# Patient Record
Sex: MALE | Race: WHITE | Employment: FULL TIME | URBAN - METROPOLITAN AREA
[De-identification: names, ages, dates, MRNs, and addresses within clinical notes are randomized per-mention and may not be internally consistent; named-entity substitution may affect disease eponyms.]

---

## 2017-02-16 ENCOUNTER — TELEPHONE (OUTPATIENT)
Dept: INTERNAL MEDICINE CLINIC | Age: 37
End: 2017-02-16

## 2017-02-16 ENCOUNTER — OFFICE VISIT (OUTPATIENT)
Dept: INTERNAL MEDICINE CLINIC | Age: 37
End: 2017-02-16

## 2017-02-16 VITALS
RESPIRATION RATE: 16 BRPM | HEART RATE: 90 BPM | DIASTOLIC BLOOD PRESSURE: 80 MMHG | BODY MASS INDEX: 55.41 KG/M2 | SYSTOLIC BLOOD PRESSURE: 130 MMHG | WEIGHT: 315 LBS

## 2017-02-16 DIAGNOSIS — L91.8 SKIN TAG: Primary | ICD-10-CM

## 2017-02-16 PROCEDURE — 99213 OFFICE O/P EST LOW 20 MIN: CPT | Performed by: INTERNAL MEDICINE

## 2017-03-07 ENCOUNTER — OFFICE VISIT (OUTPATIENT)
Dept: INTERNAL MEDICINE CLINIC | Age: 37
End: 2017-03-07

## 2017-03-07 VITALS
SYSTOLIC BLOOD PRESSURE: 132 MMHG | RESPIRATION RATE: 16 BRPM | WEIGHT: 315 LBS | BODY MASS INDEX: 40.43 KG/M2 | DIASTOLIC BLOOD PRESSURE: 70 MMHG | HEIGHT: 74 IN | HEART RATE: 96 BPM | OXYGEN SATURATION: 98 % | TEMPERATURE: 98.6 F

## 2017-03-07 DIAGNOSIS — H61.23 BILATERAL IMPACTED CERUMEN: Primary | ICD-10-CM

## 2017-03-07 PROCEDURE — 69210 REMOVE IMPACTED EAR WAX UNI: CPT | Performed by: NURSE PRACTITIONER

## 2017-03-07 PROCEDURE — 99213 OFFICE O/P EST LOW 20 MIN: CPT | Performed by: NURSE PRACTITIONER

## 2017-03-13 ENCOUNTER — OFFICE VISIT (OUTPATIENT)
Dept: INTERNAL MEDICINE CLINIC | Age: 37
End: 2017-03-13

## 2017-03-13 VITALS
RESPIRATION RATE: 16 BRPM | WEIGHT: 315 LBS | HEIGHT: 74 IN | HEART RATE: 74 BPM | SYSTOLIC BLOOD PRESSURE: 102 MMHG | BODY MASS INDEX: 40.43 KG/M2 | DIASTOLIC BLOOD PRESSURE: 60 MMHG

## 2017-03-13 DIAGNOSIS — H65.01 RIGHT ACUTE SEROUS OTITIS MEDIA, RECURRENCE NOT SPECIFIED: Primary | ICD-10-CM

## 2017-03-13 PROCEDURE — 99213 OFFICE O/P EST LOW 20 MIN: CPT | Performed by: INTERNAL MEDICINE

## 2017-03-13 RX ORDER — METHYLPREDNISOLONE 4 MG/1
TABLET ORAL
Qty: 1 KIT | Refills: 0 | Status: SHIPPED | OUTPATIENT
Start: 2017-03-13 | End: 2017-03-19

## 2017-03-13 RX ORDER — AMOXICILLIN AND CLAVULANATE POTASSIUM 875; 125 MG/1; MG/1
1 TABLET, FILM COATED ORAL 2 TIMES DAILY
Qty: 20 TABLET | Refills: 0 | Status: SHIPPED | OUTPATIENT
Start: 2017-03-13 | End: 2017-03-23

## 2017-05-23 ENCOUNTER — TELEPHONE (OUTPATIENT)
Dept: INTERNAL MEDICINE CLINIC | Age: 37
End: 2017-05-23

## 2017-05-25 ENCOUNTER — HOSPITAL ENCOUNTER (OUTPATIENT)
Dept: CT IMAGING | Age: 37
Discharge: HOME | End: 2017-05-25
Attending: SURGERY | Admitting: SURGERY

## 2017-05-25 DIAGNOSIS — N23 RENAL COLIC: ICD-10-CM

## 2017-06-22 ENCOUNTER — OFFICE VISIT (OUTPATIENT)
Dept: PULMONOLOGY | Age: 37
End: 2017-06-22

## 2017-06-22 VITALS
WEIGHT: 315 LBS | OXYGEN SATURATION: 96 % | HEART RATE: 80 BPM | TEMPERATURE: 98.5 F | HEIGHT: 74 IN | RESPIRATION RATE: 16 BRPM | DIASTOLIC BLOOD PRESSURE: 70 MMHG | BODY MASS INDEX: 40.43 KG/M2 | SYSTOLIC BLOOD PRESSURE: 124 MMHG

## 2017-06-22 DIAGNOSIS — J31.0 CHRONIC RHINITIS: ICD-10-CM

## 2017-06-22 DIAGNOSIS — Z99.89 OSA ON CPAP: Primary | ICD-10-CM

## 2017-06-22 DIAGNOSIS — G47.33 OSA ON CPAP: Primary | ICD-10-CM

## 2017-06-22 PROCEDURE — 99213 OFFICE O/P EST LOW 20 MIN: CPT | Performed by: INTERNAL MEDICINE

## 2017-06-22 ASSESSMENT — SLEEP AND FATIGUE QUESTIONNAIRES
HOW LIKELY ARE YOU TO NOD OFF OR FALL ASLEEP WHILE SITTING QUIETLY AFTER LUNCH WITHOUT ALCOHOL: 0
ESS TOTAL SCORE: 3
HOW LIKELY ARE YOU TO NOD OFF OR FALL ASLEEP WHILE SITTING AND READING: 1
HOW LIKELY ARE YOU TO NOD OFF OR FALL ASLEEP WHILE LYING DOWN TO REST IN THE AFTERNOON WHEN CIRCUMSTANCES PERMIT: 2
NECK CIRCUMFERENCE (INCHES): 20.75
HOW LIKELY ARE YOU TO NOD OFF OR FALL ASLEEP WHEN YOU ARE A PASSENGER IN A CAR FOR AN HOUR WITHOUT A BREAK: 0
HOW LIKELY ARE YOU TO NOD OFF OR FALL ASLEEP WHILE WATCHING TV: 0
HOW LIKELY ARE YOU TO NOD OFF OR FALL ASLEEP WHILE SITTING INACTIVE IN A PUBLIC PLACE: 0
HOW LIKELY ARE YOU TO NOD OFF OR FALL ASLEEP WHILE SITTING AND TALKING TO SOMEONE: 0
HOW LIKELY ARE YOU TO NOD OFF OR FALL ASLEEP IN A CAR, WHILE STOPPED FOR A FEW MINUTES IN TRAFFIC: 0

## 2017-06-30 ENCOUNTER — HOSPITAL ENCOUNTER (OUTPATIENT)
Dept: OTHER | Age: 37
Discharge: HOME | End: 2017-06-30
Attending: SURGERY | Admitting: SURGERY

## 2017-06-30 DIAGNOSIS — N20.0 CALCULUS OF KIDNEY: ICD-10-CM

## 2017-10-13 RX ORDER — CITALOPRAM 40 MG/1
TABLET ORAL
Qty: 30 TABLET | Refills: 0 | OUTPATIENT
Start: 2017-10-13 | End: 2017-11-07 | Stop reason: SDUPTHER

## 2017-11-07 RX ORDER — CITALOPRAM 40 MG/1
TABLET ORAL
Qty: 30 TABLET | Refills: 0 | Status: SHIPPED | OUTPATIENT
Start: 2017-11-07 | End: 2017-12-05 | Stop reason: SDUPTHER

## 2017-12-01 ENCOUNTER — OFFICE VISIT (OUTPATIENT)
Dept: INTERNAL MEDICINE CLINIC | Age: 37
End: 2017-12-01

## 2017-12-01 VITALS
DIASTOLIC BLOOD PRESSURE: 82 MMHG | SYSTOLIC BLOOD PRESSURE: 150 MMHG | BODY MASS INDEX: 56.6 KG/M2 | RESPIRATION RATE: 28 BRPM | OXYGEN SATURATION: 98 % | HEART RATE: 85 BPM | WEIGHT: 315 LBS

## 2017-12-01 DIAGNOSIS — R10.12 LUQ PAIN: Primary | ICD-10-CM

## 2017-12-01 LAB
A/G RATIO: 1.1 (ref 1.1–2.2)
ALBUMIN SERPL-MCNC: 4 G/DL (ref 3.4–5)
ALP BLD-CCNC: 94 U/L (ref 40–129)
ALT SERPL-CCNC: 74 U/L (ref 10–40)
ANION GAP SERPL CALCULATED.3IONS-SCNC: 14 MMOL/L (ref 3–16)
AST SERPL-CCNC: 45 U/L (ref 15–37)
BASOPHILS ABSOLUTE: 0.2 K/UL (ref 0–0.2)
BASOPHILS RELATIVE PERCENT: 1.2 %
BILIRUB SERPL-MCNC: 0.3 MG/DL (ref 0–1)
BILIRUBIN URINE: NEGATIVE
BLOOD, URINE: NEGATIVE
BUN BLDV-MCNC: 8 MG/DL (ref 7–20)
CALCIUM SERPL-MCNC: 9.8 MG/DL (ref 8.3–10.6)
CHLORIDE BLD-SCNC: 99 MMOL/L (ref 99–110)
CLARITY: ABNORMAL
CO2: 27 MMOL/L (ref 21–32)
COLOR: YELLOW
CREAT SERPL-MCNC: 0.6 MG/DL (ref 0.9–1.3)
EOSINOPHILS ABSOLUTE: 0.6 K/UL (ref 0–0.6)
EOSINOPHILS RELATIVE PERCENT: 3.3 %
EPITHELIAL CELLS, UA: 1 /HPF (ref 0–5)
GFR AFRICAN AMERICAN: >60
GFR NON-AFRICAN AMERICAN: >60
GLOBULIN: 3.6 G/DL
GLUCOSE BLD-MCNC: 135 MG/DL (ref 70–99)
GLUCOSE URINE: NEGATIVE MG/DL
HCT VFR BLD CALC: 43.2 % (ref 40.5–52.5)
HEMOGLOBIN: 14.3 G/DL (ref 13.5–17.5)
HYALINE CASTS: 0 /LPF (ref 0–8)
KETONES, URINE: NEGATIVE MG/DL
LEUKOCYTE ESTERASE, URINE: NEGATIVE
LYMPHOCYTES ABSOLUTE: 3.4 K/UL (ref 1–5.1)
LYMPHOCYTES RELATIVE PERCENT: 19.6 %
MCH RBC QN AUTO: 28.5 PG (ref 26–34)
MCHC RBC AUTO-ENTMCNC: 33 G/DL (ref 31–36)
MCV RBC AUTO: 86.4 FL (ref 80–100)
MICROSCOPIC EXAMINATION: YES
MONOCYTES ABSOLUTE: 1 K/UL (ref 0–1.3)
MONOCYTES RELATIVE PERCENT: 5.6 %
NEUTROPHILS ABSOLUTE: 12.2 K/UL (ref 1.7–7.7)
NEUTROPHILS RELATIVE PERCENT: 70.3 %
NITRITE, URINE: NEGATIVE
PDW BLD-RTO: 14.1 % (ref 12.4–15.4)
PH UA: 7
PLATELET # BLD: 463 K/UL (ref 135–450)
PMV BLD AUTO: 8.4 FL (ref 5–10.5)
POTASSIUM SERPL-SCNC: 4.1 MMOL/L (ref 3.5–5.1)
PROTEIN UA: NEGATIVE MG/DL
RBC # BLD: 4.99 M/UL (ref 4.2–5.9)
RBC UA: 4 /HPF (ref 0–4)
SODIUM BLD-SCNC: 140 MMOL/L (ref 136–145)
SPECIFIC GRAVITY UA: 1.02
TOTAL PROTEIN: 7.6 G/DL (ref 6.4–8.2)
UROBILINOGEN, URINE: 0.2 E.U./DL
WBC # BLD: 17.4 K/UL (ref 4–11)
WBC UA: 1 /HPF (ref 0–5)

## 2017-12-01 PROCEDURE — 99213 OFFICE O/P EST LOW 20 MIN: CPT | Performed by: INTERNAL MEDICINE

## 2017-12-03 DIAGNOSIS — R10.12 LEFT UPPER QUADRANT PAIN: Primary | ICD-10-CM

## 2017-12-03 DIAGNOSIS — D72.828 OTHER ELEVATED WHITE BLOOD CELL (WBC) COUNT: ICD-10-CM

## 2017-12-03 DIAGNOSIS — R79.89 ABNORMAL LFTS: ICD-10-CM

## 2017-12-08 ENCOUNTER — HOSPITAL ENCOUNTER (OUTPATIENT)
Dept: CT IMAGING | Age: 37
Discharge: HOME | End: 2017-12-08
Attending: INTERNAL MEDICINE | Admitting: INTERNAL MEDICINE

## 2017-12-08 DIAGNOSIS — D72.828 OTHER ELEVATED WHITE BLOOD CELL (WBC) COUNT: ICD-10-CM

## 2017-12-08 DIAGNOSIS — R79.89 ABNORMAL LFTS: ICD-10-CM

## 2017-12-08 DIAGNOSIS — R10.12 LEFT UPPER QUADRANT PAIN: ICD-10-CM

## 2017-12-28 ENCOUNTER — OFFICE VISIT (OUTPATIENT)
Dept: INTERNAL MEDICINE CLINIC | Age: 37
End: 2017-12-28

## 2017-12-28 VITALS
HEART RATE: 84 BPM | SYSTOLIC BLOOD PRESSURE: 124 MMHG | HEIGHT: 74 IN | BODY MASS INDEX: 40.43 KG/M2 | DIASTOLIC BLOOD PRESSURE: 60 MMHG | RESPIRATION RATE: 16 BRPM | WEIGHT: 315 LBS

## 2017-12-28 DIAGNOSIS — M94.0 COSTOCHONDRITIS: Primary | ICD-10-CM

## 2017-12-28 PROCEDURE — 99213 OFFICE O/P EST LOW 20 MIN: CPT | Performed by: INTERNAL MEDICINE

## 2017-12-28 RX ORDER — NAPROXEN 500 MG/1
500 TABLET ORAL 2 TIMES DAILY WITH MEALS
Qty: 30 TABLET | Refills: 1 | Status: SHIPPED | OUTPATIENT
Start: 2017-12-28 | End: 2018-06-04 | Stop reason: ALTCHOICE

## 2017-12-28 ASSESSMENT — PATIENT HEALTH QUESTIONNAIRE - PHQ9
1. LITTLE INTEREST OR PLEASURE IN DOING THINGS: 0
2. FEELING DOWN, DEPRESSED OR HOPELESS: 0
SUM OF ALL RESPONSES TO PHQ9 QUESTIONS 1 & 2: 0
SUM OF ALL RESPONSES TO PHQ QUESTIONS 1-9: 0

## 2017-12-28 NOTE — PROGRESS NOTES
Subjective:      Patient ID: Garret Barragan is a 30 y.o. male. HPI  Here to f/u his left flank pain. The pain is still a vague LUQ pain. The pain is a stabbing sensation, comes and goes in waves. It is a weird fluttering sensation, stabbing. It feels like a shock sensation. It comes and goes without warning, no aggravating or alleviating factors. May be present after eating, but this is not consistent. It may last 5 minutes to 30 minutes. It is not a constant pain at all. Notices it twice daily typically. Worse when lying on his left side. This keeps him from sleeping on his left side. No trauma noted    Review of Systems    Objective:   Physical Exam  Abdomen is benign. Tenderness is noted along the lower rib cage in the midclavicular line. The lateral lower rib cage is nontender, and the rib cage is not tender near the sternum. The CT scan is been reviewed with the patient and shows no abnormalities explaining his symptoms. Assessment:      Costochondritis-  Symptom constellation now currently points to costochondritis. Will treat with Naprosyn twice daily for 10-14 days. Call the symptoms do not resolve.       Plan:      As above

## 2018-03-28 ENCOUNTER — OFFICE VISIT (OUTPATIENT)
Dept: INTERNAL MEDICINE CLINIC | Age: 38
End: 2018-03-28

## 2018-03-28 VITALS
OXYGEN SATURATION: 98 % | SYSTOLIC BLOOD PRESSURE: 138 MMHG | WEIGHT: 315 LBS | HEART RATE: 78 BPM | DIASTOLIC BLOOD PRESSURE: 70 MMHG | BODY MASS INDEX: 56.24 KG/M2

## 2018-03-28 DIAGNOSIS — G47.33 OSA (OBSTRUCTIVE SLEEP APNEA): ICD-10-CM

## 2018-03-28 DIAGNOSIS — F41.9 ANXIETY: Primary | ICD-10-CM

## 2018-03-28 DIAGNOSIS — E66.01 MORBID OBESITY DUE TO EXCESS CALORIES (HCC): ICD-10-CM

## 2018-03-28 DIAGNOSIS — R73.9 HYPERGLYCEMIA: ICD-10-CM

## 2018-03-28 LAB
ESTIMATED AVERAGE GLUCOSE: 119.8 MG/DL
HBA1C MFR BLD: 5.8 %

## 2018-03-28 PROCEDURE — 99214 OFFICE O/P EST MOD 30 MIN: CPT | Performed by: INTERNAL MEDICINE

## 2018-03-28 RX ORDER — LORAZEPAM 0.5 MG/1
0.5 TABLET ORAL 3 TIMES DAILY PRN
Qty: 30 TABLET | Refills: 1 | Status: SHIPPED | OUTPATIENT
Start: 2018-03-28 | End: 2021-06-17 | Stop reason: SDUPTHER

## 2018-03-29 ASSESSMENT — ENCOUNTER SYMPTOMS
ABDOMINAL PAIN: 0
VOMITING: 0
TROUBLE SWALLOWING: 0
NAUSEA: 0
WHEEZING: 0
SHORTNESS OF BREATH: 0
DIARRHEA: 0
SORE THROAT: 0

## 2018-03-29 NOTE — PROGRESS NOTES
Department of Internal Medicine  Clinic Note    Date: 3/28/2018                                               Subjective/Objective:     Chief Complaint   Patient presents with    Anxiety     c/o bad anxiety last night. Has been on Celexa. HPI: Patient presents today for evaluation of recurrent anxiety. Patient states that he was started on Celexa a couple years ago for his anxiety with intermittent depression which has helped him manage his symptoms well. He states that recently he has had a couple episodes of anxiety without any overt cause. Patient states the most recent was 2 nights ago when he was at home watching a movie with his stepdad. He states that there were seen within the movie were guns were being fired and he feels that this may have contributed to his symptoms but during that time he felt trapped, helpless and enclosed. He states that he was given something a couple years ago on an as-needed basis for similar symptoms that worked very well. Chart review indicates the patient had a prescription for when necessary Ativan. We will provide the patient with a refill for this at this time. Patient and I had a long discussion regarding the addictive potential of this medication  Patient and I also had a long discussion regarding his obesity and the health benefits of diet and exercise. Patient Active Problem List    Diagnosis Date Noted    Neutrophilic leukocytosis 11/24/7701    LAUREN (obstructive sleep apnea) 01/13/2014    Anxiety 12/21/2012    Morbid obesity due to excess calories ([address] 11/27/2012       Social History:   TOBACCO:   reports that he has never smoked. He has never used smokeless tobacco.     ETOH:   reports that he drinks alcohol.     Past Medical History:   Diagnosis Date    Anxiety     Influenza A 03/27/2017    Kidney stones     Obesity     Rectal bleeding     intermittently    Ulcerative colitis ([address]     Unspecified sleep apnea        Past Surgical History: Take 1 tablet by mouth 3 times daily as needed for Anxiety. 30 tablet 1    naproxen (NAPROSYN) 500 MG tablet Take 1 tablet by mouth 2 times daily (with meals) 30 tablet 1    citalopram (CELEXA) 40 MG tablet TAKE 1 TABLET BY MOUTH EVERY DAY 30 tablet 5    acetaminophen (AMINOFEN) 325 MG tablet Take 2 tablets by mouth every 6 hours as needed for Pain 60 tablet 0    tamsulosin (FLOMAX) 0.4 MG capsule Take 1 capsule by mouth daily for 10 days 10 capsule 0     No current facility-administered medications for this visit. No Known Allergies    Review of Systems   Constitutional: Negative for chills, fatigue and fever. HENT: Negative for ear pain, sore throat, tinnitus and trouble swallowing. Eyes: Negative for visual disturbance. Respiratory: Negative for shortness of breath and wheezing. Cardiovascular: Negative for chest pain and palpitations. Gastrointestinal: Negative for abdominal pain, diarrhea, nausea and vomiting. Endocrine: Negative for cold intolerance and heat intolerance. Genitourinary: Negative for difficulty urinating and dysuria. Neurological: Negative for dizziness, weakness and numbness. Psychiatric/Behavioral: Positive for agitation. Negative for decreased concentration and suicidal ideas. The patient is nervous/anxious. All other systems reviewed and are negative. Vitals:  /70 (Site: Right Arm, Cuff Size: Medium Adult)   Pulse 78   Wt (!) 438 lb (198.7 kg)   SpO2 98%   BMI 56.24 kg/m²     Physical Exam   Constitutional: He is oriented to person, place, and time. He appears well-developed and well-nourished. HENT:   Head: Normocephalic and atraumatic. Eyes: Conjunctivae and lids are normal. Pupils are equal, round, and reactive to light. Neck: Trachea normal and normal range of motion. No hepatojugular reflux and no JVD present. Carotid bruit is not present. No thyromegaly present. Cardiovascular: Normal rate, regular rhythm and normal heart sounds.

## 2018-04-10 ENCOUNTER — OFFICE VISIT (OUTPATIENT)
Dept: INTERNAL MEDICINE CLINIC | Age: 38
End: 2018-04-10

## 2018-04-10 VITALS
WEIGHT: 315 LBS | RESPIRATION RATE: 16 BRPM | HEART RATE: 86 BPM | HEIGHT: 74 IN | BODY MASS INDEX: 40.43 KG/M2 | SYSTOLIC BLOOD PRESSURE: 130 MMHG | DIASTOLIC BLOOD PRESSURE: 80 MMHG

## 2018-04-10 DIAGNOSIS — F32.A ANXIETY AND DEPRESSION: Primary | ICD-10-CM

## 2018-04-10 DIAGNOSIS — F41.9 ANXIETY AND DEPRESSION: Primary | ICD-10-CM

## 2018-04-10 PROCEDURE — 99213 OFFICE O/P EST LOW 20 MIN: CPT | Performed by: INTERNAL MEDICINE

## 2018-04-10 RX ORDER — BUPROPION HYDROCHLORIDE 150 MG/1
150 TABLET ORAL EVERY MORNING
Qty: 30 TABLET | Refills: 1 | Status: SHIPPED | OUTPATIENT
Start: 2018-04-10 | End: 2018-04-17

## 2018-04-16 ENCOUNTER — TELEPHONE (OUTPATIENT)
Dept: INTERNAL MEDICINE CLINIC | Age: 38
End: 2018-04-16

## 2018-04-17 RX ORDER — BUPROPION HYDROCHLORIDE 100 MG/1
100 TABLET, EXTENDED RELEASE ORAL DAILY
Qty: 30 TABLET | Refills: 3 | Status: SHIPPED | OUTPATIENT
Start: 2018-04-17 | End: 2018-05-17

## 2018-05-17 ENCOUNTER — OFFICE VISIT (OUTPATIENT)
Dept: INTERNAL MEDICINE CLINIC | Age: 38
End: 2018-05-17

## 2018-05-17 VITALS
WEIGHT: 315 LBS | SYSTOLIC BLOOD PRESSURE: 130 MMHG | HEART RATE: 78 BPM | HEIGHT: 74 IN | DIASTOLIC BLOOD PRESSURE: 80 MMHG | RESPIRATION RATE: 16 BRPM | BODY MASS INDEX: 40.43 KG/M2

## 2018-05-17 DIAGNOSIS — R35.0 URINE FREQUENCY: ICD-10-CM

## 2018-05-17 DIAGNOSIS — F41.8 DEPRESSION WITH ANXIETY: Primary | ICD-10-CM

## 2018-05-17 LAB
BILIRUBIN, POC: NORMAL
BLOOD URINE, POC: NORMAL
CLARITY, POC: NORMAL
COLOR, POC: NORMAL
GLUCOSE URINE, POC: NORMAL
KETONES, POC: NORMAL
LEUKOCYTE EST, POC: NORMAL
NITRITE, POC: NORMAL
PH, POC: 6
PROTEIN, POC: NORMAL
SPECIFIC GRAVITY, POC: 1.02
UROBILINOGEN, POC: 0.2

## 2018-05-17 PROCEDURE — 99213 OFFICE O/P EST LOW 20 MIN: CPT | Performed by: INTERNAL MEDICINE

## 2018-05-17 PROCEDURE — 81002 URINALYSIS NONAUTO W/O SCOPE: CPT | Performed by: INTERNAL MEDICINE

## 2018-05-17 RX ORDER — SULFAMETHOXAZOLE AND TRIMETHOPRIM 800; 160 MG/1; MG/1
1 TABLET ORAL 2 TIMES DAILY
Qty: 20 TABLET | Refills: 0 | Status: SHIPPED | OUTPATIENT
Start: 2018-05-17 | End: 2018-05-27

## 2018-05-17 RX ORDER — BUPROPION HYDROCHLORIDE 150 MG/1
150 TABLET ORAL EVERY MORNING
Qty: 90 TABLET | Refills: 1 | Status: SHIPPED | OUTPATIENT
Start: 2018-05-17 | End: 2018-12-29 | Stop reason: SDUPTHER

## 2018-06-04 ENCOUNTER — OFFICE VISIT (OUTPATIENT)
Dept: INTERNAL MEDICINE CLINIC | Age: 38
End: 2018-06-04

## 2018-06-04 VITALS
OXYGEN SATURATION: 97 % | HEIGHT: 74 IN | SYSTOLIC BLOOD PRESSURE: 115 MMHG | WEIGHT: 315 LBS | BODY MASS INDEX: 40.43 KG/M2 | TEMPERATURE: 98.8 F | RESPIRATION RATE: 12 BRPM | DIASTOLIC BLOOD PRESSURE: 80 MMHG | HEART RATE: 82 BPM

## 2018-06-04 DIAGNOSIS — N41.0 ACUTE PROSTATITIS: Primary | ICD-10-CM

## 2018-06-04 PROCEDURE — 99213 OFFICE O/P EST LOW 20 MIN: CPT | Performed by: INTERNAL MEDICINE

## 2018-06-04 RX ORDER — LEVOFLOXACIN 500 MG/1
500 TABLET, FILM COATED ORAL DAILY
Qty: 10 TABLET | Refills: 0 | Status: SHIPPED | OUTPATIENT
Start: 2018-06-04 | End: 2018-06-14

## 2018-12-31 RX ORDER — CITALOPRAM 40 MG/1
TABLET ORAL
Qty: 30 TABLET | Refills: 0 | Status: SHIPPED | OUTPATIENT
Start: 2018-12-31 | End: 2019-02-01 | Stop reason: SDUPTHER

## 2018-12-31 RX ORDER — BUPROPION HYDROCHLORIDE 150 MG/1
150 TABLET ORAL EVERY MORNING
Qty: 90 TABLET | Refills: 0 | Status: SHIPPED | OUTPATIENT
Start: 2018-12-31 | End: 2019-05-05 | Stop reason: SDUPTHER

## 2019-01-09 ENCOUNTER — APPOINTMENT (OUTPATIENT)
Dept: GENERAL RADIOLOGY | Age: 39
End: 2019-01-09
Payer: COMMERCIAL

## 2019-01-09 ENCOUNTER — HOSPITAL ENCOUNTER (EMERGENCY)
Age: 39
Discharge: HOME | End: 2019-01-09
Payer: COMMERCIAL

## 2019-01-09 ENCOUNTER — APPOINTMENT (OUTPATIENT)
Dept: ULTRASOUND IMAGING | Age: 39
End: 2019-01-09
Payer: COMMERCIAL

## 2019-01-09 VITALS
OXYGEN SATURATION: 97 % | RESPIRATION RATE: 23 BRPM | TEMPERATURE: 99 F | HEIGHT: 74 IN | DIASTOLIC BLOOD PRESSURE: 79 MMHG | SYSTOLIC BLOOD PRESSURE: 98 MMHG | WEIGHT: 315 LBS | HEART RATE: 81 BPM | BODY MASS INDEX: 40.43 KG/M2

## 2019-01-09 DIAGNOSIS — D72.828 OTHER ELEVATED WHITE BLOOD CELL (WBC) COUNT: Primary | ICD-10-CM

## 2019-01-09 DIAGNOSIS — R07.9 CHEST PAIN, UNSPECIFIED TYPE: ICD-10-CM

## 2019-01-09 DIAGNOSIS — R10.13 ABDOMINAL PAIN, EPIGASTRIC: ICD-10-CM

## 2019-01-09 LAB
A/G RATIO: 0.9 (ref 1.1–2.2)
ALBUMIN SERPL-MCNC: 3.8 G/DL (ref 3.4–5)
ALP BLD-CCNC: 97 U/L (ref 40–129)
ALT SERPL-CCNC: 52 U/L (ref 10–40)
ANION GAP SERPL CALCULATED.3IONS-SCNC: 15 MMOL/L (ref 3–16)
AST SERPL-CCNC: 24 U/L (ref 15–37)
BASOPHILS ABSOLUTE: 0.2 K/UL (ref 0–0.2)
BASOPHILS RELATIVE PERCENT: 0.8 %
BILIRUB SERPL-MCNC: 0.3 MG/DL (ref 0–1)
BUN BLDV-MCNC: 13 MG/DL (ref 7–20)
CALCIUM SERPL-MCNC: 9.5 MG/DL (ref 8.3–10.6)
CHLORIDE BLD-SCNC: 103 MMOL/L (ref 99–110)
CO2: 22 MMOL/L (ref 21–32)
CREAT SERPL-MCNC: 0.6 MG/DL (ref 0.9–1.3)
EKG ATRIAL RATE: 86 BPM
EKG DIAGNOSIS: NORMAL
EKG P AXIS: 14 DEGREES
EKG P-R INTERVAL: 164 MS
EKG Q-T INTERVAL: 402 MS
EKG QRS DURATION: 116 MS
EKG QTC CALCULATION (BAZETT): 481 MS
EKG R AXIS: 24 DEGREES
EKG T AXIS: 39 DEGREES
EKG VENTRICULAR RATE: 86 BPM
EOSINOPHILS ABSOLUTE: 0.6 K/UL (ref 0–0.6)
EOSINOPHILS RELATIVE PERCENT: 3.4 %
GFR AFRICAN AMERICAN: >60
GFR NON-AFRICAN AMERICAN: >60
GLOBULIN: 4.3 G/DL
GLUCOSE BLD-MCNC: 97 MG/DL (ref 70–99)
HCT VFR BLD CALC: 43.2 % (ref 40.5–52.5)
HEMOGLOBIN: 14.3 G/DL (ref 13.5–17.5)
LIPASE: 21 U/L (ref 13–60)
LYMPHOCYTES ABSOLUTE: 4 K/UL (ref 1–5.1)
LYMPHOCYTES RELATIVE PERCENT: 21.9 %
MCH RBC QN AUTO: 28.2 PG (ref 26–34)
MCHC RBC AUTO-ENTMCNC: 33.1 G/DL (ref 31–36)
MCV RBC AUTO: 85.2 FL (ref 80–100)
MONOCYTES ABSOLUTE: 0.8 K/UL (ref 0–1.3)
MONOCYTES RELATIVE PERCENT: 4.2 %
NEUTROPHILS ABSOLUTE: 12.7 K/UL (ref 1.7–7.7)
NEUTROPHILS RELATIVE PERCENT: 69.7 %
PDW BLD-RTO: 14.9 % (ref 12.4–15.4)
PLATELET # BLD: 441 K/UL (ref 135–450)
PMV BLD AUTO: 7.6 FL (ref 5–10.5)
POTASSIUM SERPL-SCNC: 4.1 MMOL/L (ref 3.5–5.1)
RBC # BLD: 5.08 M/UL (ref 4.2–5.9)
SODIUM BLD-SCNC: 140 MMOL/L (ref 136–145)
TOTAL PROTEIN: 8.1 G/DL (ref 6.4–8.2)
TROPONIN: <0.01 NG/ML
WBC # BLD: 18.2 K/UL (ref 4–11)

## 2019-01-09 PROCEDURE — 84484 ASSAY OF TROPONIN QUANT: CPT

## 2019-01-09 PROCEDURE — 71046 X-RAY EXAM CHEST 2 VIEWS: CPT

## 2019-01-09 PROCEDURE — 93010 ELECTROCARDIOGRAM REPORT: CPT | Performed by: INTERNAL MEDICINE

## 2019-01-09 PROCEDURE — 93005 ELECTROCARDIOGRAM TRACING: CPT | Performed by: NURSE PRACTITIONER

## 2019-01-09 PROCEDURE — 80053 COMPREHEN METABOLIC PANEL: CPT

## 2019-01-09 PROCEDURE — 36415 COLL VENOUS BLD VENIPUNCTURE: CPT

## 2019-01-09 PROCEDURE — 99284 EMERGENCY DEPT VISIT MOD MDM: CPT

## 2019-01-09 PROCEDURE — 83690 ASSAY OF LIPASE: CPT

## 2019-01-09 PROCEDURE — 6370000000 HC RX 637 (ALT 250 FOR IP): Performed by: NURSE PRACTITIONER

## 2019-01-09 PROCEDURE — 85025 COMPLETE CBC W/AUTO DIFF WBC: CPT

## 2019-01-09 PROCEDURE — 76705 ECHO EXAM OF ABDOMEN: CPT

## 2019-01-09 RX ORDER — DICYCLOMINE HYDROCHLORIDE 10 MG/1
10 CAPSULE ORAL
Qty: 30 CAPSULE | Refills: 0 | Status: SHIPPED | OUTPATIENT
Start: 2019-01-09 | End: 2020-01-09

## 2019-01-09 RX ORDER — MAGNESIUM HYDROXIDE/ALUMINUM HYDROXICE/SIMETHICONE 120; 1200; 1200 MG/30ML; MG/30ML; MG/30ML
5 SUSPENSION ORAL EVERY 6 HOURS PRN
Qty: 1 BOTTLE | Refills: 0 | Status: SHIPPED | OUTPATIENT
Start: 2019-01-09 | End: 2020-01-09

## 2019-01-09 RX ORDER — OMEPRAZOLE 40 MG/1
40 CAPSULE, DELAYED RELEASE ORAL
Qty: 30 CAPSULE | Refills: 0 | Status: SHIPPED | OUTPATIENT
Start: 2019-01-09 | End: 2020-01-09

## 2019-01-09 RX ORDER — FAMOTIDINE 20 MG/1
20 TABLET, FILM COATED ORAL 2 TIMES DAILY
Qty: 60 TABLET | Refills: 0 | Status: SHIPPED | OUTPATIENT
Start: 2019-01-09 | End: 2021-01-11

## 2019-01-09 RX ADMIN — LIDOCAINE HYDROCHLORIDE: 20 SOLUTION ORAL; TOPICAL at 12:14

## 2019-01-09 ASSESSMENT — ENCOUNTER SYMPTOMS
ABDOMINAL PAIN: 1
RESPIRATORY NEGATIVE: 1
VOMITING: 0
NAUSEA: 0
DIARRHEA: 1

## 2019-01-09 ASSESSMENT — PAIN DESCRIPTION - ORIENTATION: ORIENTATION: LEFT;LOWER

## 2019-01-09 ASSESSMENT — PAIN DESCRIPTION - DESCRIPTORS: DESCRIPTORS: TENDER

## 2019-01-09 ASSESSMENT — PAIN DESCRIPTION - LOCATION: LOCATION: ABDOMEN

## 2019-01-09 ASSESSMENT — HEART SCORE: ECG: 0

## 2019-02-01 RX ORDER — CITALOPRAM 40 MG/1
TABLET ORAL
Qty: 30 TABLET | Refills: 5 | Status: SHIPPED | OUTPATIENT
Start: 2019-02-01 | End: 2019-09-21 | Stop reason: SDUPTHER

## 2019-09-23 RX ORDER — CITALOPRAM 40 MG/1
TABLET ORAL
Qty: 30 TABLET | Refills: 0 | OUTPATIENT
Start: 2019-09-23 | End: 2019-10-24 | Stop reason: SDUPTHER

## 2019-10-24 RX ORDER — CITALOPRAM 40 MG/1
TABLET ORAL
Qty: 30 TABLET | Refills: 0 | OUTPATIENT
Start: 2019-10-24 | End: 2019-12-07 | Stop reason: SDUPTHER

## 2019-12-07 RX ORDER — CITALOPRAM 40 MG/1
TABLET ORAL
Qty: 30 TABLET | Refills: 0 | Status: SHIPPED | OUTPATIENT
Start: 2019-12-07 | End: 2020-01-06

## 2020-01-06 RX ORDER — CITALOPRAM 40 MG/1
TABLET ORAL
Qty: 30 TABLET | Refills: 0 | Status: SHIPPED | OUTPATIENT
Start: 2020-01-06 | End: 2020-02-05

## 2020-01-10 ENCOUNTER — HOSPITAL ENCOUNTER (OUTPATIENT)
Dept: VASCULAR LAB | Age: 40
Discharge: HOME | End: 2020-01-10
Payer: COMMERCIAL

## 2020-01-10 PROCEDURE — 93971 EXTREMITY STUDY: CPT

## 2020-01-27 ENCOUNTER — TELEPHONE (OUTPATIENT)
Dept: SURGERY | Age: 40
End: 2020-01-27

## 2020-01-30 ENCOUNTER — INITIAL CONSULT (OUTPATIENT)
Dept: SURGERY | Age: 40
End: 2020-01-30
Payer: COMMERCIAL

## 2020-01-30 VITALS
WEIGHT: 315 LBS | HEART RATE: 81 BPM | DIASTOLIC BLOOD PRESSURE: 78 MMHG | SYSTOLIC BLOOD PRESSURE: 126 MMHG | BODY MASS INDEX: 58.75 KG/M2

## 2020-01-30 PROBLEM — M79.605 PAIN OF LEFT LEG: Status: ACTIVE | Noted: 2020-01-30

## 2020-01-30 PROBLEM — R22.42: Status: ACTIVE | Noted: 2020-01-30

## 2020-01-30 PROCEDURE — 99243 OFF/OP CNSLTJ NEW/EST LOW 30: CPT | Performed by: SURGERY

## 2020-01-30 ASSESSMENT — ENCOUNTER SYMPTOMS
EYES NEGATIVE: 1
RESPIRATORY NEGATIVE: 1
ALLERGIC/IMMUNOLOGIC NEGATIVE: 1
GASTROINTESTINAL NEGATIVE: 1

## 2020-01-30 NOTE — PROGRESS NOTES
4.20 - 5.90 M/uL Final    Hemoglobin 01/09/2019 14.3  13.5 - 17.5 g/dL Final    Hematocrit 01/09/2019 43.2  40.5 - 52.5 % Final    MCV 01/09/2019 85.2  80.0 - 100.0 fL Final    MCH 01/09/2019 28.2  26.0 - 34.0 pg Final    MCHC 01/09/2019 33.1  31.0 - 36.0 g/dL Final    RDW 01/09/2019 14.9  12.4 - 15.4 % Final    Platelets 80/00/6906 441  135 - 450 K/uL Final    MPV 01/09/2019 7.6  5.0 - 10.5 fL Final    Neutrophils % 01/09/2019 69.7  % Final    Lymphocytes % 01/09/2019 21.9  % Final    Monocytes % 01/09/2019 4.2  % Final    Eosinophils % 01/09/2019 3.4  % Final    Basophils % 01/09/2019 0.8  % Final    Neutrophils Absolute 01/09/2019 12.7* 1.7 - 7.7 K/uL Final    Lymphocytes Absolute 01/09/2019 4.0  1.0 - 5.1 K/uL Final    Monocytes Absolute 01/09/2019 0.8  0.0 - 1.3 K/uL Final    Eosinophils Absolute 01/09/2019 0.6  0.0 - 0.6 K/uL Final    Basophils Absolute 01/09/2019 0.2  0.0 - 0.2 K/uL Final    Sodium 01/09/2019 140  136 - 145 mmol/L Final    Potassium 01/09/2019 4.1  3.5 - 5.1 mmol/L Final    Chloride 01/09/2019 103  99 - 110 mmol/L Final    CO2 01/09/2019 22  21 - 32 mmol/L Final    Anion Gap 01/09/2019 15  3 - 16 Final    Glucose 01/09/2019 97  70 - 99 mg/dL Final    BUN 01/09/2019 13  7 - 20 mg/dL Final    CREATININE 01/09/2019 0.6* 0.9 - 1.3 mg/dL Final    GFR Non- 01/09/2019 >60  >60 Final    Comment: >60 mL/min/1.73m2 EGFR, calc. for ages 25 and older using the  MDRD formula (not corrected for weight), is valid for stable  renal function.  GFR  01/09/2019 >60  >60 Final    Comment: Chronic Kidney Disease: less than 60 ml/min/1.73 sq.m. Kidney Failure: less than 15 ml/min/1.73 sq.m. Results valid for patients 18 years and older.       Calcium 01/09/2019 9.5  8.3 - 10.6 mg/dL Final    Total Protein 01/09/2019 8.1  6.4 - 8.2 g/dL Final    Alb 01/09/2019 3.8  3.4 - 5.0 g/dL Final    Albumin/Globulin Ratio 01/09/2019 0.9* 1.1 - 2.2 Final    Total Bilirubin 01/09/2019 0.3  0.0 - 1.0 mg/dL Final    Alkaline Phosphatase 01/09/2019 97  40 - 129 U/L Final    ALT 01/09/2019 52* 10 - 40 U/L Final    AST 01/09/2019 24  15 - 37 U/L Final    Globulin 01/09/2019 4.3  g/dL Final    Lipase 01/09/2019 21.0  13.0 - 60.0 U/L Final    Troponin 01/09/2019 <0.01  <0.01 ng/mL Final    Methodology by Troponin T    Ventricular Rate 01/09/2019 86  BPM Final    Atrial Rate 01/09/2019 86  BPM Final    P-R Interval 01/09/2019 164  ms Final    QRS Duration 01/09/2019 116  ms Final    Q-T Interval 01/09/2019 402  ms Final    QTc Calculation (Bazett) 01/09/2019 481  ms Final    P Axis 01/09/2019 14  degrees Final    R Axis 01/09/2019 24  degrees Final    T Axis 01/09/2019 39  degrees Final    Diagnosis 01/09/2019 Normal sinus rhythmWhen compared with ECG of 06-MAY-2014 12:04,No significant change was foundConfirmed by [facility] DUGG[facility] MD ([id number]) on 1/9/2019 11:54:38 PM   Final       Review of Systems   Constitutional: Negative. HENT: Negative. Eyes: Negative. Respiratory: Negative. Cardiovascular: Negative. Gastrointestinal: Negative. Endocrine: Negative. Genitourinary: Negative. Musculoskeletal: Negative. Allergic/Immunologic: Negative. Neurological: Negative. Hematological: Negative. Psychiatric/Behavioral: Negative. All other systems reviewed and are negative. Physical Exam  Vitals signs and nursing note reviewed. Constitutional:       Appearance: Normal appearance. He is well-developed. He is obese. Comments: Morbidly obese   HENT:      Head: Normocephalic and atraumatic. Right Ear: External ear normal.      Left Ear: External ear normal.   Eyes:      General: No scleral icterus. Conjunctiva/sclera: Conjunctivae normal.      Pupils: Pupils are equal, round, and reactive to light. Neck:      Musculoskeletal: Normal range of motion and neck supple. Thyroid: No thyromegaly.       Vascular: No JVD. Trachea: No tracheal deviation. Cardiovascular:      Rate and Rhythm: Normal rate and regular rhythm. Pulses:           Femoral pulses are 2+ on the right side and 2+ on the left side. Dorsalis pedis pulses are 2+ on the right side and 2+ on the left side. Posterior tibial pulses are 2+ on the right side and 0 on the left side. Heart sounds: Normal heart sounds. No murmur. No gallop. Comments:   Doppler 1/30/2020:  Rt DP: biphasic   Rt PT: biphasic  Rt AT:     Lt DP: biphasic  Lt PT: biphasic  Lt AT:    MEASUREMENTS:    RIGHT ANKLE: 26.1 cm   RIGHT CALF: 49.0 cm     LEFT ANKLE: 27.2 cm   LEFT CALF: 50.6 cm       Pulmonary:      Effort: Pulmonary effort is normal.      Breath sounds: Normal breath sounds. No wheezing or rales. Chest:      Chest wall: No tenderness. Abdominal:      General: Bowel sounds are normal. There is no distension or abdominal bruit. Palpations: Abdomen is soft. There is no mass. Tenderness: There is no abdominal tenderness. There is no guarding or rebound. Hernia: No hernia is present. There is no hernia in the ventral area, right inguinal area or left inguinal area. Genitourinary:     Comments: RECTAL EXAM  &  Guaiac NOT INDICATED  Musculoskeletal: Normal range of motion. General: No tenderness. Legs:    Lymphadenopathy:      Head:      Right side of head: No submental, submandibular, preauricular or occipital adenopathy. Left side of head: No submental, submandibular, preauricular or occipital adenopathy. Cervical: No cervical adenopathy. Right cervical: No superficial, deep or posterior cervical adenopathy. Left cervical: No superficial, deep or posterior cervical adenopathy. Upper Body:      Right upper body: No supraclavicular or pectoral adenopathy. Left upper body: No supraclavicular or pectoral adenopathy. Skin:     General: Skin is warm and dry.    Neurological:

## 2020-04-05 RX ORDER — BUPROPION HYDROCHLORIDE 150 MG/1
150 TABLET ORAL EVERY MORNING
Qty: 90 TABLET | Refills: 3 | Status: SHIPPED | OUTPATIENT
Start: 2020-04-05 | End: 2021-06-01

## 2020-05-28 ENCOUNTER — TELEPHONE (OUTPATIENT)
Dept: BARIATRICS/WEIGHT MGMT | Age: 40
End: 2020-05-28

## 2020-06-10 ENCOUNTER — TELEPHONE (OUTPATIENT)
Dept: BARIATRICS/WEIGHT MGMT | Age: 40
End: 2020-06-10

## 2020-07-02 ENCOUNTER — OFFICE VISIT (OUTPATIENT)
Dept: BARIATRICS/WEIGHT MGMT | Age: 40
End: 2020-07-02
Payer: COMMERCIAL

## 2020-07-02 VITALS
TEMPERATURE: 97.1 F | WEIGHT: 315 LBS | BODY MASS INDEX: 40.43 KG/M2 | HEART RATE: 84 BPM | SYSTOLIC BLOOD PRESSURE: 124 MMHG | HEIGHT: 74 IN | DIASTOLIC BLOOD PRESSURE: 78 MMHG

## 2020-07-02 PROCEDURE — 99204 OFFICE O/P NEW MOD 45 MIN: CPT | Performed by: SURGERY

## 2020-07-02 NOTE — PROGRESS NOTES
Mecca Alvarado is a 42 y.o. male with a date of birth of 1980. Vitals:    07/02/20 1156   Temp: 97.1 °F (36.2 °C)   BMI: Body mass index is 56.95 kg/m². Obesity Classification: Class III    Weight History: Wt Readings from Last 3 Encounters:   07/02/20 (!) 443 lb 9.6 oz (201.2 kg)   01/30/20 (!) 457 lb 9.6 oz (207.6 kg)   01/17/20 (!) 456 lb (206.8 kg)       Patient's lowest adult weight was 300 lbs at age 27. Patient's highest adult weight was 443.6 lbs at age 42. Patient has participated in the following weight loss programs: Atkins Diet, , Calorie Restriction and Low Carb diet. Patient has participated in meal replacement/liquid diets. Patient has participated in weight loss medications - adipex. Patient is not lactose intolerant. Patient does not have Church/cultural food concerns. Patient does not have food allergies. Patient does tolerate artificial sweeteners. 24 hour recall/food frequency chart:  *Works 12hr shifts & takes care of his dad (a lot depends on what he likes), eating is somewhat inconsistent  Breakfast: no.   Snack: no.   Lunch: yes. 3p Calzone from [employer] w/ flat bread pizza & 2 vanilla cream sodas  Snack: no.   Dinner: yes. 10p Calzone  Snack: yes. 11p chocolate chip ice cream     Drinks throughout the day: regular soda & diet soda / powerade   Do you drink alcohol? Yes. How often/how much alcohol do you drink: 1 Beers per month (average). Patient does not meet the criteria for binge eating disorder. Patient does not have grazing. Patient does not have night eating. Patient does not have a history of emotional eating or eating out of boredom. Surgery  Patient does feel confident in his ability to make these changes. The patient's expectations of post-surgical eating habits - voices understanding. Patient states he does understand the consequences of not complying with post-op food guidelines.   Patient states he does understands the long term changes in food intake that will be necessary for all occasions after surgery for the rest of her life. Patient is deemed nutritionally appropriate to proceed. Goals  Weight: 220 lb  Health Improvement: sleep apnea, stamina, back pain, hopefully run again & be healthier overall    Assessment  Nutritional Needs: RMR=(9.99 x 202) + (6.25 x 188) - (4.92 x 39 y.o.) +5 = 3016 kcal x 1.4 (sedentary activity factor)= 4222 kcal - 1000 (for 2 lb weight loss/week)= 3222 kcal.    Plan  Plan/Recommendations: Start presurgical guidelines. Goals:   -Eat 4-5 times daily  -Avoid high fat and high sugar foods  -Include protein with all meals and snacks  -Avoid carbonation and caffeine  -Avoid calorie containing beverages  -Increase physical activity as tolerated    PES Statement:  Overweight/Obesity related to lack of exercise, sedentary lifestyle, unhealthy eating habits, and unsuccessful diet attempts as evidenced by BMI. Body mass index is 56.95 kg/m². Will follow up as necessary.     Saleem Pinon

## 2020-07-05 NOTE — PROGRESS NOTES
[facility]) Physicians   General & Laparoscopic Surgery  [employer] [facility] [employer]      HPI:    Venecia Xie is a very pleasant 42 y.o. obese male ,   Body mass index is 56.95 kg/m². And multiple medical problems who is presenting for weight loss surgery evaluation and consultation by Dr. Cami Billy. Patient has been struggling for several years now with obesity. Patient feels the weight is an obstacle to achieve and perform things in daily living as well risk on health. Tries to diet, and exercise but can't keep the weight off. Patient tried Atkins and other regimens, but with no sustainable weight loss. Patient  is very determined to lose weight and be healthy, and is interested in surgical weight loss to achieve this goal.    Otherwise patient denies any nausea, vomiting, fevers, chills, shortness of breath, chest pain, constipation or urinary symptoms. Pain Assessment   Denies any abdominal pain     Past Medical History:   Diagnosis Date    Anxiety     Chronic GERD     Depression     Influenza A 03/27/2017    Kidney stones     Obesity     Rectal bleeding     intermittently    Ulcerative colitis ([address]     Unspecified sleep apnea      Past Surgical History:   Procedure Laterality Date    ABDOMEN SURGERY Bilateral 1985    hernia repair    COLONOSCOPY  2009    tics, ulcerative colitis    COLONOSCOPY  3/2014    [place]-polyp x 2, diverticulosis     Family History   Problem Relation Age of Onset    Cancer Mother     Asthma Mother     High Blood Pressure Father     Obesity Father     Cancer Maternal Grandfather     Migraines Sister     Asthma Other     Irritable Bowel Syndrome Other     Stroke Other     Diabetes Other      Social History     Tobacco Use    Smoking status: Never Smoker    Smokeless tobacco: Never Used   Substance Use Topics    Alcohol use: Yes     Comment: rarely     I counseled the patient on the importance of not smoking and risks of ETOH.

## 2020-08-03 RX ORDER — CITALOPRAM 40 MG/1
TABLET ORAL
Qty: 30 TABLET | Refills: 5 | Status: SHIPPED | OUTPATIENT
Start: 2020-08-03 | End: 2021-04-30

## 2020-08-03 NOTE — PROGRESS NOTES
Butch Greco 49  1980 August 4, 2020    Referring Physician: Hernandez Oliveira MD  Reason for Referral: pre-operative risk assessment     CC: \"I need clearance. \"     HPI:  The patient is 42 y.o. male with a past medical history significant for sleep apnea and obesity who presents today for a pre-operative risk assessment prior to gastric sleeve surgery. He denies any new cardiac sounding complaints. He denies any history of CKD, CHF, MI, CVA, or DM on insulin. He describes a functional status of >4 METs. He reports chronic FREY that he relates to deconditioning and his weight but he denies any acute changes. He has chronic back pain that also limits his mobility but states he continues to mow the grass without any exertional chest pain. The FREY improves quickly with rest. He reports compliance with his CPAP. Patient denies exertional chest pain/pressure, dyspnea at rest, worsening FREY, PND, orthopnea, palpitations, lightheadedness, changes in LE edema, and syncope.     Past Medical History:   Diagnosis Date    Anxiety     Chronic GERD     Depression     Influenza A 03/27/2017    Kidney stones     Obesity     Rectal bleeding     intermittently    [address] [address]     Unspecified sleep apnea      Past Surgical History:   Procedure Laterality Date    ABDOMEN SURGERY Bilateral 1985    hernia repair    COLONOSCOPY  2009    tics, ulcerative colitis    COLONOSCOPY  3/2014    [place]-polyp x 2, diverticulosis     Family History   Problem Relation Age of Onset    Cancer Mother     Asthma Mother     High Blood Pressure Father     Obesity Father     Cancer Maternal Grandfather     Migraines Sister     Asthma Other     Irritable Bowel Syndrome Other     Stroke Other     Diabetes Other      Social History     Tobacco Use    Smoking status: Never Smoker    Smokeless tobacco: Never Used   Substance Use Topics    Alcohol use: Yes     Comment: rarely    Drug use: No       No Known Allergies  Current Outpatient Medications   Medication Sig Dispense Refill    citalopram (CELEXA) 40 MG tablet TAKE 1 TABLET BY MOUTH EVERY DAY 30 tablet 5    buPROPion (WELLBUTRIN XL) 150 MG extended release tablet TAKE 1 TABLET BY MOUTH EVERY MORNING 90 tablet 3    aspirin 325 MG EC tablet Take 325 mg by mouth 2 times daily      famotidine (PEPCID) 20 MG tablet Take 1 tablet by mouth 2 times daily 60 tablet 0    acetaminophen (AMINOFEN) 325 MG tablet Take 2 tablets by mouth every 6 hours as needed for Pain 60 tablet 0     No current facility-administered medications for this visit. Review of Systems:  · Constitutional: No unanticipated weight loss. There's been no change in energy level, sleep pattern, or activity level. No fevers, chills. · Eyes: No visual changes or diplopia. No scleral icterus. · ENT: No Headaches, hearing loss or vertigo. No mouth sores or sore throat. · Cardiovascular: as reviewed in HPI  · Respiratory: No cough or wheezing, no sputum production. No hemoptysis. · Gastrointestinal: No abdominal pain, appetite loss, blood in stools. No change in bowel or bladder habits. · Genitourinary: No dysuria, trouble voiding, or hematuria. · Musculoskeletal:  No gait disturbance, no joint complaints. · Integumentary: No rash or pruritis. · Neurological: No headache, diplopia, change in muscle strength, numbness or tingling. · Psychiatric: No anxiety or depression. · Endocrine: No temperature intolerance. No excessive thirst, fluid intake, or urination. No tremor. · Hematologic/Lymphatic: No abnormal bruising or bleeding, blood clots or swollen lymph nodes. · Allergic/Immunologic: No nasal congestion or hives.     Physical Exam:   /76 (Site: Right Lower Arm, Position: Sitting, Cuff Size: Medium Adult)   Pulse 75   Temp 97.9 °F (36.6 °C)   Ht 6' 2\" (1.88 m)   Wt (!) 451 lb 9.6 oz (204.8 kg) Comment: with shoes  SpO2 100%   BMI 57.98 kg/m²   Wt Readings from Last 3 Encounters:   08/04/20 (!) 451 lb 9.6 oz (204.8 kg)   07/02/20 (!) 443 lb 9.6 oz (201.2 kg)   01/30/20 (!) 457 lb 9.6 oz (207.6 kg)     Constitutional: He is an obese male who is oriented to person, place, and time. In no acute distress. Head: Normocephalic and atraumatic. Pupils equal and round. Neck: Neck supple. No JVP or carotid bruit appreciated. No mass and no thyromegaly present. No lymphadenopathy present. Cardiovascular: Normal rate. Normal heart sounds. Exam reveals no gallop and no friction rub. No murmur heard. Pulmonary/Chest: Effort normal and breath sounds normal. No respiratory distress. He has no wheezes, rhonchi or rales. Abdominal: Soft, non-tender. Bowel sounds are normal. He exhibits no organomegaly, mass or bruit. Extremities: Trace BLE edema. No cyanosis or clubbing. Pulses are 2+ radial/carotid bilaterally. Neurological: No gross cranial nerve deficit. Coordination normal.   Skin: Skin is warm and dry. There is no rash or diaphoresis. Psychiatric: He has a normal mood and affect. His speech is normal and behavior is normal.     Lab Review:   FLP:    Lab Results   Component Value Date    TRIG 70 10/10/2016    HDL 43 10/10/2016    LDLCALC 91 10/10/2016    LABVLDL 14 10/10/2016     BUN/Creatinine:    Lab Results   Component Value Date    BUN 13 01/09/2019    CREATININE 0.6 01/09/2019     EKG Interpretation: 8/4/20 NSR. Normal ECG. Image Review:   No stress test or echo on file     Assessment/Plan:   1) Pre-operative risk assessment. Patient is low cardiac risk based on RCRI score of zero. Patient's risk should not preclude him from proceeding with surgery. 2) Morbid obesity. BMI 57. Encouraged weight loss and continue bariatric work up.     3) LAUREN. On CPAP; reports compliance. Follow up on an as needed basis. Thank you very much for allowing me to participate in the care of your patient.  Please do not hesitate to contact me if you have any questions. Sincerely,  Lisa Villar. Jimmy Brownlee, [address] [facility], [address] [place] [address] Jamin Medrano [facility]  Ph: [phone]  Fax: [phone]    This note was scribed in the presence of Dr Jimmy Brownlee MD by Oskar Dominguez RN. Physician Attestation: The scribes documentation has been prepared under my direction and personally reviewed by me in its entirety. I confirm that the note above accurately reflects all work, treatment, procedures, and medical decision making performed by me. All portions of the note including but not limited to the chief complaint, history of present illness, physical exam, assessment and plan/medical decision making were personally reviewed, edited, and updated on the day of the visit.

## 2020-08-03 NOTE — PATIENT INSTRUCTIONS
Some machines have air pressure that adjusts on its own. Others have air pressures that are different when you breathe in than when you breathe out. This may reduce discomfort caused by too much pressure in your nose. Where can you learn more? Go to [email or web address] org and sign in to your [employer] account. Enter W991 in the [employer] box to learn more about \"Learning About CPAP for Sleep Apnea. \"     If you do not have an account, please click on the \"Sign Up Now\" link. Current as of: February 24, 2020               Content Version: 12.5  © 1800-3740 [employer]. Care instructions adapted under license by [facility] ([facility]). If you have questions about a medical condition or this instruction, always ask your healthcare professional. [address] any warranty or liability for your use of this information.

## 2020-08-04 ENCOUNTER — OFFICE VISIT (OUTPATIENT)
Dept: CARDIOLOGY CLINIC | Age: 40
End: 2020-08-04
Payer: COMMERCIAL

## 2020-08-04 VITALS
TEMPERATURE: 97.9 F | BODY MASS INDEX: 40.43 KG/M2 | WEIGHT: 315 LBS | HEIGHT: 74 IN | HEART RATE: 75 BPM | SYSTOLIC BLOOD PRESSURE: 124 MMHG | OXYGEN SATURATION: 100 % | DIASTOLIC BLOOD PRESSURE: 76 MMHG

## 2020-08-04 PROCEDURE — 93000 ELECTROCARDIOGRAM COMPLETE: CPT | Performed by: INTERNAL MEDICINE

## 2020-08-04 PROCEDURE — 99204 OFFICE O/P NEW MOD 45 MIN: CPT | Performed by: INTERNAL MEDICINE

## 2020-08-06 ENCOUNTER — OFFICE VISIT (OUTPATIENT)
Dept: BARIATRICS/WEIGHT MGMT | Age: 40
End: 2020-08-06
Payer: COMMERCIAL

## 2020-08-06 VITALS — WEIGHT: 315 LBS | HEIGHT: 74 IN | BODY MASS INDEX: 40.43 KG/M2

## 2020-08-06 PROCEDURE — 99213 OFFICE O/P EST LOW 20 MIN: CPT | Performed by: SURGERY

## 2020-08-06 NOTE — PATIENT INSTRUCTIONS
Patient received dietary handouts and education. Plan/Recommendations:    Focus on lean protein 4x/day  Eliminate diet soda   Try protein powder

## 2020-08-06 NOTE — PROGRESS NOTES
[facility]) Physicians   General & Laparoscopic Surgery  [employer]       HPI:     Víctor Tobias is a very pleasant 42 y.o. male with Body mass index is 57.57 kg/m². , Pre-Surgery. Pre-operative clearance and work up pending. Working hard to keep good dietary habits as well level of activity. Patient denies any nausea, vomiting, fevers, chills, shortness of breath, chest pain, cough, constipation or difficulty urinating. Past Medical History:   Diagnosis Date    Anxiety     Chronic GERD     Depression     Influenza A 03/27/2017    Kidney stones     Obesity     Rectal bleeding     intermittently    Ulcerative colitis ([facility] [address]     Unspecified sleep apnea      Past Surgical History:   Procedure Laterality Date    ABDOMEN SURGERY Bilateral 1985    hernia repair    COLONOSCOPY  2009    tics, ulcerative colitis    COLONOSCOPY  3/2014    O'Henderson-polyp x 2, diverticulosis     Family History   Problem Relation Age of Onset    Cancer Mother     Asthma Mother     High Blood Pressure Father     Obesity Father     Cancer Maternal Grandfather     Migraines Sister     Asthma Other     Irritable Bowel Syndrome Other     Stroke Other     Diabetes Other      Social History     Tobacco Use    Smoking status: Never Smoker    Smokeless tobacco: Never Used   Substance Use Topics    Alcohol use: Yes     Comment: rarely     I counseled the patient on the importance of not smoking and risks of ETOH. No Known Allergies  Vitals:    08/06/20 0957   Weight: (!) 448 lb 6.4 oz (203.4 kg)   Height: 6' 2\" (1.88 m)       Body mass index is 57.57 kg/m².       Current Outpatient Medications:     citalopram (CELEXA) 40 MG tablet, TAKE 1 TABLET BY MOUTH EVERY DAY, Disp: 30 tablet, Rfl: 5    buPROPion (WELLBUTRIN XL) 150 MG extended release tablet, TAKE 1 TABLET BY MOUTH EVERY MORNING, Disp: 90 tablet, Rfl: 3    aspirin 325 MG EC tablet, Take 325 mg by mouth 2 times daily, Disp: , Rfl:     famotidine (PEPCID) 20 MG tablet, Take 1 tablet by mouth 2 times daily, Disp: 60 tablet, Rfl: 0    acetaminophen (AMINOFEN) 325 MG tablet, Take 2 tablets by mouth every 6 hours as needed for Pain, Disp: 60 tablet, Rfl: 0      Review of Systems - History obtained from the patient  General ROS: negative  Psychological ROS: negative  Hematological and Lymphatic ROS: negative  Endocrine ROS: negative  Respiratory ROS: negative  Cardiovascular ROS: negative  Gastrointestinal ROS:negative  Genito-Urinary ROS: negative  Musculoskeletal ROS: negative   Skin ROS: negative    Physical Exam   Vitals Reviewed   Constitutional: Patient is oriented to person, place, and time. Patient appears well-developed and well-nourished. Patient is active and cooperative. Non-toxic appearance. No distress. Neck: Trachea normal and normal range of motion. No JVD present. Pulmonary/Chest: Effort normal. No accessory muscle usage or stridor. No apnea. No respiratory distress. Cardiovascular: Normal rate and no JVD. Abdominal: Normal appearance. Patient exhibits no distension. Abdomen is soft, obese, non tender. Musculoskeletal: Normal range of motion. Patient exhibits no edema. Neurological: Patient is alert and oriented to person, place, and time. Patient has normal strength. GCS eye subscore is 4. GCS verbal subscore is 5. GCS motor subscore is 6. Skin: Skin is warm and dry. No abrasion and no rash noted. Patient is not diaphoretic. No cyanosis or erythema. Psychiatric: Patient has a normal mood and affect. Speech is normal and behavior is normal. Cognition and memory are normal.       A/P    Víctor Tobias is 42 y.o. male, Body mass index is 57.57 kg/m². pre surgery, has gained 5# since last visit. The patient underwent dietary counseling with registered dietician. I have reviewed, discussed and agree with the dietary plan. Patient is trying hard to keep good dietary and behavior modifications.  Patient is monitoring portion sizes,

## 2020-08-06 NOTE — PROGRESS NOTES
Alex 74 gained 4.8 lbs over past ~ month. Is pt eating at least 4 times everyday? states this is random, might not wake up until 3p if he was up all night. Ranges from 2-3x/day     B 930a- 4 small breakfast wraps OR cereal  L 4p- calzone   D 11p- Large 3 Way from [employer] OR grilled steak w/ rice  S- sometimes a couple spoons of PB    Is pt eating a lean protein source with all meals and snacks? not always lean    Has pt decreased their portions using the plate method? trying to eat less frozen meals    Is pt choosing low fat/sugar free options? no - eats out frequently    Is pt drinking at least 64 oz of clear liquids everyday? yes - sf flavoring / powerade zero    Has pt stopped drinking carbonation, caffeinated, and sugar sweetened beverages? yes - diet soda ~ every 3 days / eliminated regular powerade    Has pt sampled Verlene Client and/or Nectar protein? discussed, to try    Participating in intentional exercise? none    Plan/Recommendations:    Focus on lean protein 4x/day  Eliminate diet soda   Try protein powder    Handouts: frozen meals    Kennedy Armstrong

## 2020-09-08 ENCOUNTER — TELEPHONE (OUTPATIENT)
Dept: BARIATRICS/WEIGHT MGMT | Age: 40
End: 2020-09-08

## 2020-09-08 ENCOUNTER — OFFICE VISIT (OUTPATIENT)
Dept: PRIMARY CARE CLINIC | Age: 40
End: 2020-09-08
Payer: COMMERCIAL

## 2020-09-08 PROCEDURE — 99211 OFF/OP EST MAY X REQ PHY/QHP: CPT | Performed by: NURSE PRACTITIONER

## 2020-09-08 NOTE — TELEPHONE ENCOUNTER
Spoke with pt to confirm his 8am arrival for his 10 am EGD on 9/14/20. Pt to be NPO 12 hours prior. Pt stated he got his Covid test done today as well.

## 2020-09-10 LAB — SARS-COV-2, NAA: NOT DETECTED

## 2020-09-11 ENCOUNTER — ANESTHESIA EVENT (OUTPATIENT)
Dept: ENDOSCOPY | Age: 40
End: 2020-09-11
Payer: COMMERCIAL

## 2020-09-14 ENCOUNTER — ANESTHESIA (OUTPATIENT)
Dept: ENDOSCOPY | Age: 40
End: 2020-09-14
Payer: COMMERCIAL

## 2020-09-14 ENCOUNTER — HOSPITAL ENCOUNTER (OUTPATIENT)
Age: 40
Setting detail: OUTPATIENT SURGERY
Discharge: HOME | End: 2020-09-14
Attending: SURGERY | Admitting: SURGERY
Payer: COMMERCIAL

## 2020-09-14 VITALS
WEIGHT: 315 LBS | OXYGEN SATURATION: 97 % | TEMPERATURE: 97.8 F | RESPIRATION RATE: 16 BRPM | DIASTOLIC BLOOD PRESSURE: 71 MMHG | BODY MASS INDEX: 40.43 KG/M2 | SYSTOLIC BLOOD PRESSURE: 129 MMHG | HEIGHT: 74 IN | HEART RATE: 79 BPM

## 2020-09-14 VITALS — OXYGEN SATURATION: 97 % | SYSTOLIC BLOOD PRESSURE: 143 MMHG | DIASTOLIC BLOOD PRESSURE: 92 MMHG

## 2020-09-14 PROCEDURE — 3609012400 HC EGD TRANSORAL BIOPSY SINGLE/MULTIPLE: Performed by: SURGERY

## 2020-09-14 PROCEDURE — 3700000001 HC ADD 15 MINUTES (ANESTHESIA): Performed by: SURGERY

## 2020-09-14 PROCEDURE — 6360000002 HC RX W HCPCS: Performed by: NURSE ANESTHETIST, CERTIFIED REGISTERED

## 2020-09-14 PROCEDURE — 43239 EGD BIOPSY SINGLE/MULTIPLE: CPT | Performed by: SURGERY

## 2020-09-14 PROCEDURE — 7100000011 HC PHASE II RECOVERY - ADDTL 15 MIN: Performed by: SURGERY

## 2020-09-14 PROCEDURE — 7100000010 HC PHASE II RECOVERY - FIRST 15 MIN: Performed by: SURGERY

## 2020-09-14 PROCEDURE — 3700000000 HC ANESTHESIA ATTENDED CARE: Performed by: SURGERY

## 2020-09-14 PROCEDURE — 88305 TISSUE EXAM BY PATHOLOGIST: CPT

## 2020-09-14 PROCEDURE — 2580000003 HC RX 258: Performed by: SURGERY

## 2020-09-14 PROCEDURE — 2580000003 HC RX 258: Performed by: NURSE ANESTHETIST, CERTIFIED REGISTERED

## 2020-09-14 PROCEDURE — 2709999900 HC NON-CHARGEABLE SUPPLY: Performed by: SURGERY

## 2020-09-14 RX ORDER — PROPOFOL 10 MG/ML
INJECTION, EMULSION INTRAVENOUS PRN
Status: DISCONTINUED | OUTPATIENT
Start: 2020-09-14 | End: 2020-09-14 | Stop reason: SDUPTHER

## 2020-09-14 RX ORDER — SODIUM CHLORIDE 9 MG/ML
INJECTION, SOLUTION INTRAVENOUS CONTINUOUS
Status: DISCONTINUED | OUTPATIENT
Start: 2020-09-14 | End: 2020-09-14 | Stop reason: HOSPADM

## 2020-09-14 RX ORDER — SODIUM CHLORIDE 0.9 % (FLUSH) 0.9 %
10 SYRINGE (ML) INJECTION PRN
Status: DISCONTINUED | OUTPATIENT
Start: 2020-09-14 | End: 2020-09-14 | Stop reason: HOSPADM

## 2020-09-14 RX ORDER — SODIUM CHLORIDE, SODIUM LACTATE, POTASSIUM CHLORIDE, CALCIUM CHLORIDE 600; 310; 30; 20 MG/100ML; MG/100ML; MG/100ML; MG/100ML
INJECTION, SOLUTION INTRAVENOUS CONTINUOUS
Status: DISCONTINUED | OUTPATIENT
Start: 2020-09-14 | End: 2020-09-14 | Stop reason: HOSPADM

## 2020-09-14 RX ORDER — SODIUM CHLORIDE, SODIUM LACTATE, POTASSIUM CHLORIDE, CALCIUM CHLORIDE 600; 310; 30; 20 MG/100ML; MG/100ML; MG/100ML; MG/100ML
INJECTION, SOLUTION INTRAVENOUS CONTINUOUS PRN
Status: DISCONTINUED | OUTPATIENT
Start: 2020-09-14 | End: 2020-09-14 | Stop reason: SDUPTHER

## 2020-09-14 RX ORDER — LIDOCAINE HYDROCHLORIDE 20 MG/ML
INJECTION, SOLUTION INTRAVENOUS PRN
Status: DISCONTINUED | OUTPATIENT
Start: 2020-09-14 | End: 2020-09-14 | Stop reason: SDUPTHER

## 2020-09-14 RX ORDER — SODIUM CHLORIDE 0.9 % (FLUSH) 0.9 %
10 SYRINGE (ML) INJECTION EVERY 12 HOURS SCHEDULED
Status: DISCONTINUED | OUTPATIENT
Start: 2020-09-14 | End: 2020-09-14 | Stop reason: HOSPADM

## 2020-09-14 RX ADMIN — SODIUM CHLORIDE, SODIUM LACTATE, POTASSIUM CHLORIDE, AND CALCIUM CHLORIDE: 600; 310; 30; 20 INJECTION, SOLUTION INTRAVENOUS at 10:38

## 2020-09-14 RX ADMIN — SODIUM CHLORIDE, POTASSIUM CHLORIDE, SODIUM LACTATE AND CALCIUM CHLORIDE: 600; 310; 30; 20 INJECTION, SOLUTION INTRAVENOUS at 10:05

## 2020-09-14 RX ADMIN — PROPOFOL 25 MG: 10 INJECTION, EMULSION INTRAVENOUS at 10:25

## 2020-09-14 RX ADMIN — PROPOFOL 200 MG: 10 INJECTION, EMULSION INTRAVENOUS at 10:23

## 2020-09-14 RX ADMIN — LIDOCAINE HYDROCHLORIDE 20 MG: 20 INJECTION, SOLUTION INTRAVENOUS at 10:25

## 2020-09-14 RX ADMIN — LIDOCAINE HYDROCHLORIDE 50 MG: 20 INJECTION, SOLUTION INTRAVENOUS at 10:23

## 2020-09-14 RX ADMIN — LIDOCAINE HYDROCHLORIDE 25 MG: 20 INJECTION, SOLUTION INTRAVENOUS at 10:24

## 2020-09-14 ASSESSMENT — PULMONARY FUNCTION TESTS
PIF_VALUE: 0
PIF_VALUE: 3

## 2020-09-14 ASSESSMENT — PAIN - FUNCTIONAL ASSESSMENT: PAIN_FUNCTIONAL_ASSESSMENT: 0-10

## 2020-09-14 ASSESSMENT — LIFESTYLE VARIABLES: SMOKING_STATUS: 0

## 2020-09-14 NOTE — H&P
[facility]) Physicians   General & Laparoscopic Surgery  [employer]        HPI:     Jayson Rodriguez is a very pleasant 42 y.o. male with Body mass index is 57.57 kg/m². , Pre-Surgery.        Past Medical History        Past Medical History:   Diagnosis Date    Anxiety      Chronic GERD      Depression      Influenza A 03/27/2017    Kidney stones      Obesity      Rectal bleeding       intermittently    Ulcerative colitis ([address]      Unspecified sleep apnea          Past Surgical History         Past Surgical History:   Procedure Laterality Date    ABDOMEN SURGERY Bilateral 1985     hernia repair    COLONOSCOPY   2009     tics, ulcerative colitis    COLONOSCOPY   3/2014     O'Vasyl-polyp x 2, diverticulosis        Family History         Family History   Problem Relation Age of Onset    Cancer Mother      Asthma Mother      High Blood Pressure Father      Obesity Father      Cancer Maternal Grandfather      Migraines Sister      Asthma Other      Irritable Bowel Syndrome Other      Stroke Other      Diabetes Other          Social History            Tobacco Use    Smoking status: Never Smoker    Smokeless tobacco: Never Used   Substance Use Topics    Alcohol use: Yes       Comment: rarely     I counseled the patient on the importance of not smoking and risks of ETOH.    No Known Allergies             Body mass index is 57.57 kg/m².     Current Medication     Current Outpatient Medications:     citalopram (CELEXA) 40 MG tablet, TAKE 1 TABLET BY MOUTH EVERY DAY, Disp: 30 tablet, Rfl: 5    buPROPion (WELLBUTRIN XL) 150 MG extended release tablet, TAKE 1 TABLET BY MOUTH EVERY MORNING, Disp: 90 tablet, Rfl: 3    aspirin 325 MG EC tablet, Take 325 mg by mouth 2 times daily, Disp: , Rfl:     famotidine (PEPCID) 20 MG tablet, Take 1 tablet by mouth 2 times daily, Disp: 60 tablet, Rfl: 0    acetaminophen (AMINOFEN) 325 MG tablet, Take 2 tablets by mouth every 6 hours as needed for

## 2020-09-14 NOTE — ANESTHESIA POSTPROCEDURE EVALUATION
Department of Anesthesiology  Postprocedure Note    Patient: Claudette Alegre  MRN: [id number]  YOB: 1980  Date of evaluation: 9/14/2020  Time:  11:00 AM     Procedure Summary     Date:  09/14/20 Room / Location:  [facility]    Anesthesia Start:  2626 Anesthesia Stop:  4363    Procedure:  EGD BIOPSY (N/A ) Diagnosis:       Pre-operative clearance      (Pre-operative clearance [Z01.818])    Surgeon: Maude Haque DO Responsible Provider:  Angus Jc MD    Anesthesia Type:  MAC ASA Status:  3          Anesthesia Type: MAC    Yolanda Phase I: Yolanda Score: 10    Yolanda Phase II: Yolanda Score: 10    Last vitals: Reviewed and per EMR flowsheets.        Anesthesia Post Evaluation    Patient location during evaluation: PACU  Level of consciousness: awake and alert  Airway patency: patent  Nausea & Vomiting: no vomiting  Complications: no  Cardiovascular status: blood pressure returned to baseline  Respiratory status: acceptable  Hydration status: euvolemic

## 2020-09-14 NOTE — ANESTHESIA PRE PROCEDURE
Department of Anesthesiology  Preprocedure Note       Name:  Claudette Alegre   Age:  42 y.o.  :  1980                                          MRN:  [id number]         Date:  2020      Surgeon: Valdez Choe): Maude Haque DO    Procedure: Procedure(s):  ESOPHAGOGASTRODUODENOSCOPY    Medications prior to admission:   Prior to Admission medications    Medication Sig Start Date End Date Taking? Authorizing Provider   citalopram (CELEXA) 40 MG tablet TAKE 1 TABLET BY MOUTH EVERY DAY 8/3/20   Kiera Morgan MD   buPROPion [facility] XL) 150 MG extended release tablet TAKE 1 TABLET BY MOUTH EVERY MORNING 20   Kiera Morgan MD   aspirin 325 MG EC tablet Take 325 mg by mouth 2 times daily    Historical Provider, MD   famotidine (PEPCID) 20 MG tablet Take 1 tablet by mouth 2 times daily 19   CLARISSA Vides - CNP   acetaminophen (AMINOFEN) 325 MG tablet Take 2 tablets by mouth every 6 hours as needed for Pain 3/27/17   Lisa Velazquez PA-C       Current medications:    No current facility-administered medications for this encounter.         Allergies:  No Known Allergies    Problem List:    Patient Active Problem List   Diagnosis Code    Morbid obesity due to excess calories ([address] E66.01    Anxiety F41.9    LAUREN (obstructive sleep apnea) C72.51    Neutrophilic leukocytosis H27.0    Pain of left leg M79.605    Subcutaneous nodule of left lower extremity R22.42       Past Medical History:        Diagnosis Date    Anxiety     Chronic GERD     Depression     Influenza A 2017    Kidney stones     Obesity     Rectal bleeding     intermittently    Ulcerative colitis ([facility] [address]     Unspecified sleep apnea        Past Surgical History:        Procedure Laterality Date    ABDOMEN SURGERY Bilateral 1985    hernia repair    COLONOSCOPY      tics, ulcerative colitis    COLONOSCOPY  3/2014    O'Vasyl-polyp x 2, diverticulosis       Social History:    Social History HIV, HEPCAB    COVID-19 Screening (If Applicable):   Lab Results   Component Value Date    COVID19 NOT DETECTED 09/08/2020         Anesthesia Evaluation    Airway: Mallampati: II  TM distance: >3 FB   Neck ROM: full  Mouth opening: > = 3 FB Dental: normal exam         Pulmonary: breath sounds clear to auscultation  (+) sleep apnea: on CPAP,      (-) COPD, asthma and not a current smoker                           Cardiovascular:  Exercise tolerance: good (>4 METS),       (-) hypertension, valvular problems/murmurs, past MI, CAD, CABG/stent, dysrhythmias,  angina,  CHF, orthopnea, PND and  FREY      Rhythm: regular  Rate: normal                    Neuro/Psych:   (+) depression/anxiety    (-) seizures and TIA           GI/Hepatic/Renal:   (+) GERD:, PUD (ulcerative colitis), morbid obesity          Endo/Other:    (+) no malignancy/cancer. (-) diabetes mellitus, hypothyroidism, hyperthyroidism, no malignancy/cancer               Abdominal:           Vascular:     - DVT and PE. Anesthesia Plan      MAC     ASA 3       Induction: intravenous. Anesthetic plan and risks discussed with patient. Plan discussed with CRNA.                   Yair Jiménez MD   9/14/2020

## 2020-09-14 NOTE — OP NOTE
Esophagogastroduodenoscopy     Indications: Pre-op gastric Surgery, rule out Gastroesophageal reflux disease. Pre-operative Diagnosis: Obesity/pre-op bariatric surgery . Post-operative Diagnosis: Superficial Gastritis    Surgeon: Darrion Dumont    Anesthesia: MAC      Procedure Details   The patient was seen in the Holding Room. The risks, benefits, complications, treatment options, and expected outcomes were discussed with the patient. Pre-op Endoscopy recommended to rule any intragastric pathology that would interfere with proposed procedure /  And or due to current conditions. The possibilities of reaction to medication, pulmonary aspiration, perforation of viscus, bleeding, recurrent infection, the need for additional procedures, failure to diagnose a condition, and creating a complication requiring transfusion or operation were discussed with the patient. The patient concurred with the proposed plan, giving informed consent. The site of surgery properly noted/marked. The patient was taken to Endoscopy Suite, identified as Brenda Alvarado and the procedure verified as  Esophagogastroduodenoscopy  A Time Out was held and the above information confirmed. Upper Endoscopy: An endoscope was inserted through the oropharynx into the upper esophagus. The endoscope was passed into the stomach to the level of the pylorus and passed to the duodenum where the ampulla was visualized and duodenum was normal. Then the scope was retracted back to the stomach and it was abnormal , biopsy of the antrum obtained for HP, then retroflexed and the gastroesophageal junction was inspected.  There was no hiatal hernia and no evidence of Mai's     Findings:  Esophageal findings include:  Upper: normal Esophageal Mucosa  Lower:normal Esophageal Mucosa  Distal: normal Esophageal Mucosa      Gastric findings include: abnormal Gastric Mucosa    Duodenal Findings: normal Duodenal Mucosa      Estimated Blood Loss:  none    Biopsy: Antrum    Complications:  None; patient tolerated the procedure well. Disposition: PACU - hemodynamically stable. Condition: stable    Attending Attestation: I was present and scrubbed for the entire procedure.

## 2020-10-01 ENCOUNTER — OFFICE VISIT (OUTPATIENT)
Dept: BARIATRICS/WEIGHT MGMT | Age: 40
End: 2020-10-01
Payer: COMMERCIAL

## 2020-10-01 VITALS
SYSTOLIC BLOOD PRESSURE: 116 MMHG | WEIGHT: 315 LBS | DIASTOLIC BLOOD PRESSURE: 75 MMHG | HEIGHT: 74 IN | HEART RATE: 73 BPM | BODY MASS INDEX: 40.43 KG/M2

## 2020-10-01 PROCEDURE — 99213 OFFICE O/P EST LOW 20 MIN: CPT | Performed by: SURGERY

## 2020-10-01 NOTE — PROGRESS NOTES
Alex Galicia lost 3.6 lbs over past ~ month. Pt states he just got back from vacation and said there was a lot more food. States he has cut back on eating out. Is pt eating at least 4 times everyday? 3x/day     B- soylent (400 tatiana / 20g protein / 1g sugar / 24g of fat)  L- steamer vegetable w/ chicken breast  D- steamer vegetables w/ chicken breast    Is pt eating a lean protein source with all meals and snacks? yes    Has pt decreased their portions using the plate method? yes - mindful / improved    Is pt choosing low fat/sugar free options? yes    Is pt drinking at least 64 oz of clear liquids everyday? yes - water    Has pt stopped drinking carbonation, caffeinated, and sugar sweetened beverages?  yes    Has pt sampled Unjury and/or Nectar protein? discussed, to try    Participating in intentional exercise? no    Plan/Recommendations:   - Focus on lean protein 4x/day  - Change meal replacement at B  - Try protein powder  - Complete support group    Handouts: edith Leonard

## 2020-10-01 NOTE — PATIENT INSTRUCTIONS
Patient received dietary handouts and education.     Plan/Recommendations:   - Focus on lean protein 4x/day  - Change meal replacement at B  - Try protein powder  - Complete support group

## 2020-10-01 NOTE — PROGRESS NOTES
[facility]) Physicians   General & Laparoscopic Surgery  [employer]       HPI:     Marjorie Alves is a very pleasant 42 y.o. male with Body mass index is 57.11 kg/m². , Pre-Surgery. Pre-operative clearance and work up pending. Working hard to keep good dietary habits as well level of activity. Patient denies any nausea, vomiting, fevers, chills, shortness of breath, chest pain, cough, constipation or difficulty urinating. Past Medical History:   Diagnosis Date    Anxiety     Chronic GERD     Depression     Influenza A 03/27/2017    Kidney stones     Obesity     Rectal bleeding     intermittently    Ulcerative colitis ([facility] [address]     Unspecified sleep apnea      Past Surgical History:   Procedure Laterality Date    ABDOMEN SURGERY Bilateral 1985    hernia repair    COLONOSCOPY  2009    tics, ulcerative colitis    COLONOSCOPY  3/2014    O'Vasyl-polyp x 2, diverticulosis    UPPER GASTROINTESTINAL ENDOSCOPY N/A 9/14/2020    EGD BIOPSY performed by Dasia Wilson DO at [address] History   Problem Relation Age of Onset    Cancer Mother     Asthma Mother     High Blood Pressure Father     Obesity Father     Cancer Maternal Grandfather     Migraines Sister     Asthma Other     Irritable Bowel Syndrome Other     Stroke Other     Diabetes Other      Social History     Tobacco Use    Smoking status: Never Smoker    Smokeless tobacco: Never Used   Substance Use Topics    Alcohol use: Yes     Comment: rarely     I counseled the patient on the importance of not smoking and risks of ETOH. No Known Allergies  Vitals:    10/01/20 0850   BP: 116/75   Pulse: 73   Weight: (!) 444 lb 12.8 oz (201.8 kg)   Height: 6' 2\" (1.88 m)       Body mass index is 57.11 kg/m².       Current Outpatient Medications:     citalopram (CELEXA) 40 MG tablet, TAKE 1 TABLET BY MOUTH EVERY DAY, Disp: 30 tablet, Rfl: 5    buPROPion (WELLBUTRIN XL) 150 MG extended release tablet, TAKE 1 TABLET BY MOUTH EVERY MORNING, Disp: 90 tablet, Rfl: 3    acetaminophen (AMINOFEN) 325 MG tablet, Take 2 tablets by mouth every 6 hours as needed for Pain, Disp: 60 tablet, Rfl: 0    aspirin 325 MG EC tablet, Take 325 mg by mouth 2 times daily, Disp: , Rfl:     famotidine (PEPCID) 20 MG tablet, Take 1 tablet by mouth 2 times daily, Disp: 60 tablet, Rfl: 0      Review of Systems - History obtained from the patient  General ROS: negative  Psychological ROS: negative  Endocrine ROS: negative  Respiratory ROS: negative  Cardiovascular ROS: negative  Gastrointestinal ROS:negative  Genito-Urinary ROS: negative  Musculoskeletal ROS: negative   Skin ROS: negative    Physical Exam   Vitals Reviewed   Constitutional: Patient is oriented to person, place, and time. Patient appears well-developed and well-nourished. Patient is active and cooperative. Non-toxic appearance. No distress. Neck: Trachea normal and normal range of motion. No JVD present. Pulmonary/Chest: Effort normal. No accessory muscle usage or stridor. No apnea. No respiratory distress. Cardiovascular: Normal rate and no JVD. Abdominal: Normal appearance. Patient exhibits no distension. Abdomen is soft, obese, non tender. Musculoskeletal: Normal range of motion. Patient exhibits no edema. Neurological: Patient is alert and oriented to person, place, and time. Patient has normal strength. GCS eye subscore is 4. GCS verbal subscore is 5. GCS motor subscore is 6. Skin: Skin is warm and dry. No abrasion and no rash noted. Patient is not diaphoretic. No cyanosis or erythema. Psychiatric: Patient has a normal mood and affect. Speech is normal and behavior is normal. Cognition and memory are normal.       A/P    Sethtrudy David is 42 y.o. male, Body mass index is 57.11 kg/m². pre surgery, has lost 3.6# since last visit. The patient underwent dietary counseling with registered dietician. I have reviewed, discussed and agree with the dietary plan.  Patient is trying hard to keep good dietary and behavior modifications. Patient is monitoring portion sizes, food choices and liquid calories. Patient is trying to exercise regularly as much as possible. We discussed how his weight affects his overall health including:  Esthela Rodriguez was seen today for weight management. Diagnoses and all orders for this visit:    Morbid obesity with BMI of 50.0-59.9, adult (HCC)    LAUREN (obstructive sleep apnea)       and importance of weight loss to alleviate those co morbid conditions. I encouraged the patient to continue exercise and keeping healthy eating habits. Discussed pre-op labs and work up till now. Also counseled the patient extensively on Surgery. I spent 15 minutes face to face with patient with more than 50% of the time counseling and/or coordinating care for weight loss surgery. RTC in 4 weeks  Obtain rest of pre-op work up / clearances  Diet and Exercise      Patient advised that its their responsibility to follow up for studies and/or labs ordered today.      Indira Marino

## 2020-11-19 ENCOUNTER — OFFICE VISIT (OUTPATIENT)
Dept: BARIATRICS/WEIGHT MGMT | Age: 40
End: 2020-11-19
Payer: COMMERCIAL

## 2020-11-19 VITALS
HEART RATE: 80 BPM | SYSTOLIC BLOOD PRESSURE: 118 MMHG | DIASTOLIC BLOOD PRESSURE: 75 MMHG | BODY MASS INDEX: 40.43 KG/M2 | WEIGHT: 315 LBS | HEIGHT: 74 IN

## 2020-11-19 PROCEDURE — 99213 OFFICE O/P EST LOW 20 MIN: CPT | Performed by: SURGERY

## 2020-11-19 NOTE — PROGRESS NOTES
[facility]) Physicians   General & Laparoscopic Surgery  [employer]       HPI:     Samantha Hood is a very pleasant 42 y.o. male with Body mass index is 57.03 kg/m². , Pre-Surgery. Pre-operative clearance and work up pending. Working hard to keep good dietary habits as well level of activity. Patient denies any nausea, vomiting, fevers, chills, shortness of breath, chest pain, cough, constipation or difficulty urinating. Past Medical History:   Diagnosis Date    Anxiety     Chronic GERD     Depression     Influenza A 03/27/2017    Kidney stones     Obesity     Rectal bleeding     intermittently    Ulcerative colitis ([address]     Unspecified sleep apnea      Past Surgical History:   Procedure Laterality Date    ABDOMEN SURGERY Bilateral 1985    hernia repair    COLONOSCOPY  2009    tics, ulcerative colitis    COLONOSCOPY  3/2014    O'[place]-polyp x 2, diverticulosis    UPPER GASTROINTESTINAL ENDOSCOPY N/A 9/14/2020    EGD BIOPSY performed by Jordan Caldwell DO at [address] History   Problem Relation Age of Onset    Cancer Mother     Asthma Mother     High Blood Pressure Father     Obesity Father     Cancer Maternal Grandfather     Migraines Sister     Asthma Other     Irritable Bowel Syndrome Other     Stroke Other     Diabetes Other      Social History     Tobacco Use    Smoking status: Never Smoker    Smokeless tobacco: Never Used   Substance Use Topics    Alcohol use: Yes     Comment: rarely     I counseled the patient on the importance of not smoking and risks of ETOH. No Known Allergies  Vitals:    11/19/20 0934   BP: 118/75   Pulse: 80   Weight: (!) 444 lb 3.2 oz (201.5 kg)   Height: 6' 2\" (1.88 m)       Body mass index is 57.03 kg/m².       Current Outpatient Medications:     citalopram (CELEXA) 40 MG tablet, TAKE 1 TABLET BY MOUTH EVERY DAY, Disp: 30 tablet, Rfl: 5    buPROPion (WELLBUTRIN XL) 150 MG extended release tablet, TAKE 1 TABLET BY MOUTH EVERY MORNING, Disp: 90 tablet, Rfl: 3    acetaminophen (AMINOFEN) 325 MG tablet, Take 2 tablets by mouth every 6 hours as needed for Pain, Disp: 60 tablet, Rfl: 0    aspirin 325 MG EC tablet, Take 325 mg by mouth 2 times daily, Disp: , Rfl:     famotidine (PEPCID) 20 MG tablet, Take 1 tablet by mouth 2 times daily, Disp: 60 tablet, Rfl: 0      Review of Systems - History obtained from the patient  General ROS: negative  Psychological ROS: negative  Endocrine ROS: negative  Respiratory ROS: negative  Cardiovascular ROS: negative  Gastrointestinal ROS:negative  Genito-Urinary ROS: negative  Musculoskeletal ROS: negative   Skin ROS: negative    Physical Exam   Vitals Reviewed   Constitutional: Patient is oriented to person, place, and time. Patient appears well-developed and well-nourished. Patient is active and cooperative. Non-toxic appearance. No distress. Neck: Trachea normal and normal range of motion. No JVD present. Pulmonary/Chest: Effort normal. No accessory muscle usage or stridor. No apnea. No respiratory distress. Cardiovascular: Normal rate and no JVD. Abdominal: Normal appearance. Patient exhibits no distension. Abdomen is soft, obese, non tender. Musculoskeletal: Normal range of motion. Patient exhibits no edema. Neurological: Patient is alert and oriented to person, place, and time. Patient has normal strength. GCS eye subscore is 4. GCS verbal subscore is 5. GCS motor subscore is 6. Skin: Skin is warm and dry. No abrasion and no rash noted. Patient is not diaphoretic. No cyanosis or erythema. Psychiatric: Patient has a normal mood and affect. Speech is normal and behavior is normal. Cognition and memory are normal.       A/P    Armida Caraballo is 42 y.o. male, Body mass index is 57.03 kg/m². pre surgery, has lost 0.6# since last visit. The patient underwent dietary counseling with registered dietician. I have reviewed, discussed and agree with the dietary plan.  Patient is trying hard to keep good dietary and behavior modifications. Patient is monitoring portion sizes, food choices and liquid calories. Patient is trying to exercise regularly as much as possible. We discussed how his weight affects his overall health including:  Jose Mcadams was seen today for weight management. Diagnoses and all orders for this visit:    Morbid obesity with BMI of 50.0-59.9, adult (HCC)    LAUREN (obstructive sleep apnea)       and importance of weight loss to alleviate those co morbid conditions. I encouraged the patient to continue exercise and keeping healthy eating habits. Discussed pre-op labs and work up till now. Also counseled the patient extensively on Surgery. I spent 15 minutes face to face with patient with more than 50% of the time counseling and/or coordinating care for weight loss surgery. RTC in 4 weeks  Obtain rest of pre-op work up / clearances  Diet and Exercise      Patient advised that its their responsibility to follow up for studies and/or labs ordered today.      Elizabet Diaz

## 2020-11-19 NOTE — PROGRESS NOTES
Alex 74 lost .6 lbs over ~6 weeks. Is pt eating at least 4 times everyday? 3-4x/day    B- protein shake  L- 4-5 tenderloin chicken   S- ~10oz steak   S- 4 bananas  D- bowl of cereal (grape nuts)  S- PB 4-5 spoon fulls    Is pt eating a lean protein source with all meals and snacks? yes    Has pt decreased their portions using the plate method? no    Is pt choosing low fat/sugar free options? yes    Is pt drinking at least 64 oz of clear liquids everyday? yes - water / powerade zero    Has pt stopped drinking carbonation, caffeinated, and sugar sweetened beverages?  yes    Has pt sampled Unjury and/or Nectar protein? discussed, to try    Participating in intentional exercise? using virtual reality set to play, having a lot more movement    Plan/Recommendations:   - Measure portions  - Limit fruit to 2 servings per day  - Try protein powder  - Complete & return support group     Handouts: portion control    Moni Lou

## 2020-12-17 ENCOUNTER — OFFICE VISIT (OUTPATIENT)
Dept: BARIATRICS/WEIGHT MGMT | Age: 40
End: 2020-12-17
Payer: COMMERCIAL

## 2020-12-17 VITALS
BODY MASS INDEX: 40.43 KG/M2 | SYSTOLIC BLOOD PRESSURE: 105 MMHG | HEIGHT: 74 IN | HEART RATE: 86 BPM | DIASTOLIC BLOOD PRESSURE: 74 MMHG | WEIGHT: 315 LBS

## 2020-12-17 PROCEDURE — 99213 OFFICE O/P EST LOW 20 MIN: CPT | Performed by: SURGERY

## 2020-12-17 NOTE — PROGRESS NOTES
[facility]) Physicians   General & Laparoscopic Surgery  [employer]       HPI:     Marlin Bailey is a very pleasant 42 y.o. male with Body mass index is 53.92 kg/m². , Pre-Surgery. Pre-operative clearance and work up pending. Working hard to keep good dietary habits as well level of activity. Patient denies any nausea, vomiting, fevers, chills, shortness of breath, chest pain, cough, constipation or difficulty urinating. Past Medical History:   Diagnosis Date    Anxiety     Chronic GERD     Depression     Influenza A 03/27/2017    Kidney stones     Obesity     Rectal bleeding     intermittently    [address] ([facility] [address]     Unspecified sleep apnea      Past Surgical History:   Procedure Laterality Date    ABDOMEN SURGERY Bilateral 1985    hernia repair    COLONOSCOPY  2009    tics, ulcerative colitis    COLONOSCOPY  3/2014    O'[place]-polyp x 2, diverticulosis    UPPER GASTROINTESTINAL ENDOSCOPY N/A 9/14/2020    EGD BIOPSY performed by Génesis Salazar DO at [address] History   Problem Relation Age of Onset    Cancer Mother     Asthma Mother     High Blood Pressure Father     Obesity Father     Cancer Maternal Grandfather     Migraines Sister     Asthma Other     Irritable Bowel Syndrome Other     Stroke Other     Diabetes Other      Social History     Tobacco Use    Smoking status: Never Smoker    Smokeless tobacco: Never Used   Substance Use Topics    Alcohol use: Yes     Comment: rarely     I counseled the patient on the importance of not smoking and risks of ETOH. No Known Allergies  Vitals:    12/17/20 0754   BP: 105/74   Pulse: 86   Weight: (!) 420 lb (190.5 kg)   Height: 6' 2\" (1.88 m)       Body mass index is 53.92 kg/m².       Current Outpatient Medications:     citalopram (CELEXA) 40 MG tablet, TAKE 1 TABLET BY MOUTH EVERY DAY, Disp: 30 tablet, Rfl: 5   buPROPion (WELLBUTRIN XL) 150 MG extended release tablet, TAKE 1 TABLET BY MOUTH EVERY MORNING, Disp: 90 tablet, Rfl: 3    acetaminophen (AMINOFEN) 325 MG tablet, Take 2 tablets by mouth every 6 hours as needed for Pain, Disp: 60 tablet, Rfl: 0    aspirin 325 MG EC tablet, Take 325 mg by mouth 2 times daily, Disp: , Rfl:     famotidine (PEPCID) 20 MG tablet, Take 1 tablet by mouth 2 times daily, Disp: 60 tablet, Rfl: 0      Review of Systems - History obtained from the patient  General ROS: negative  Psychological ROS: negative  Endocrine ROS: negative  Respiratory ROS: negative  Cardiovascular ROS: negative  Gastrointestinal ROS:negative  Genito-Urinary ROS: negative  Musculoskeletal ROS: negative   Skin ROS: negative    Physical Exam   Vitals Reviewed   Constitutional: Patient is oriented to person, place, and time. Patient appears well-developed and well-nourished. Patient is active and cooperative. Non-toxic appearance. No distress. Neck: Trachea normal and normal range of motion. No JVD present. Pulmonary/Chest: Effort normal. No accessory muscle usage or stridor. No apnea. No respiratory distress. Cardiovascular: Normal rate and no JVD. Abdominal: Normal appearance. Patient exhibits no distension. Abdomen is soft, obese, non tender. Musculoskeletal: Normal range of motion. Patient exhibits no edema. Neurological: Patient is alert and oriented to person, place, and time. Patient has normal strength. GCS eye subscore is 4. GCS verbal subscore is 5. GCS motor subscore is 6. Skin: Skin is warm and dry. No abrasion and no rash noted. Patient is not diaphoretic. No cyanosis or erythema. Psychiatric: Patient has a normal mood and affect.  Speech is normal and behavior is normal. Cognition and memory are normal.       A/P Daniel Costa is 42 y.o. male, Body mass index is 53.92 kg/m². pre surgery, has lost 24# since last visit. The patient underwent dietary counseling with registered dietician. I have reviewed, discussed and agree with the dietary plan. Patient is trying hard to keep good dietary and behavior modifications. Patient is monitoring portion sizes, food choices and liquid calories. Patient is trying to exercise regularly as much as possible. We discussed how his weight affects his overall health including:  Karl Cooper was seen today for weight management. Diagnoses and all orders for this visit:    Morbid obesity with BMI of 50.0-59.9, adult (HCC)    LAUREN (obstructive sleep apnea)       and importance of weight loss to alleviate those co morbid conditions. I encouraged the patient to continue exercise and keeping healthy eating habits. Discussed pre-op labs and work up till now. Also counseled the patient extensively on Surgery. I spent 15 minutes face to face with patient with more than 50% of the time counseling and/or coordinating care for weight loss surgery. RTC in 4 weeks  Obtain rest of pre-op work up / clearances  Diet and Exercise      Patient advised that its their responsibility to follow up for studies and/or labs ordered today.      Isidor Babinski

## 2020-12-17 NOTE — PROGRESS NOTES
Alex 74 lost 24.2 lbs over past month. Is pt eating at least 4 times everyday? Yes    Is pt eating a lean protein source with all meals and snacks? chicken tenderloin with frozen veggies     Has pt decreased their portions using the plate method? Better    Is pt choosing low fat/sugar free options? No -  [employer] and ice cream     Is pt drinking at least 64 oz of clear liquids everyday? Yes    Has pt stopped drinking carbonation, caffeinated, and sugar sweetened beverages? Yes - Drinking powerade zero, water, skim milk     Has pt sampled Unjury and/or Nectar protein? Not yet, discussed today    Participating in intentional exercise?  Limited with knees/back     Plan/Recommendations:   Try protein powder  Avoid high fat/sugar foods - mcribs/ice cream     Handouts: none     Evelyn Herrera

## 2021-01-21 ENCOUNTER — OFFICE VISIT (OUTPATIENT)
Dept: BARIATRICS/WEIGHT MGMT | Age: 41
End: 2021-01-21
Payer: COMMERCIAL

## 2021-01-21 ENCOUNTER — OFFICE VISIT (OUTPATIENT)
Dept: ENT CLINIC | Age: 41
End: 2021-01-21
Payer: COMMERCIAL

## 2021-01-21 VITALS
BODY MASS INDEX: 56.24 KG/M2 | OXYGEN SATURATION: 97 % | HEART RATE: 76 BPM | WEIGHT: 315 LBS | DIASTOLIC BLOOD PRESSURE: 77 MMHG | TEMPERATURE: 98.7 F | SYSTOLIC BLOOD PRESSURE: 121 MMHG

## 2021-01-21 VITALS
WEIGHT: 315 LBS | HEIGHT: 74 IN | BODY MASS INDEX: 40.43 KG/M2 | HEART RATE: 88 BPM | DIASTOLIC BLOOD PRESSURE: 76 MMHG | SYSTOLIC BLOOD PRESSURE: 126 MMHG

## 2021-01-21 DIAGNOSIS — G47.33 OSA (OBSTRUCTIVE SLEEP APNEA): ICD-10-CM

## 2021-01-21 DIAGNOSIS — H93.13 TINNITUS OF BOTH EARS: Primary | ICD-10-CM

## 2021-01-21 DIAGNOSIS — J34.2 DEVIATED NASAL SEPTUM: ICD-10-CM

## 2021-01-21 DIAGNOSIS — H61.23 BILATERAL IMPACTED CERUMEN: ICD-10-CM

## 2021-01-21 DIAGNOSIS — J34.3 HYPERTROPHY OF BOTH INFERIOR NASAL TURBINATES: ICD-10-CM

## 2021-01-21 DIAGNOSIS — E66.01 MORBID OBESITY WITH BMI OF 50.0-59.9, ADULT (HCC): Primary | ICD-10-CM

## 2021-01-21 DIAGNOSIS — J31.0 CHRONIC RHINITIS: ICD-10-CM

## 2021-01-21 DIAGNOSIS — H93.8X3 SENSATION OF FULLNESS IN BOTH EARS: ICD-10-CM

## 2021-01-21 DIAGNOSIS — J34.89 NASAL OBSTRUCTION: ICD-10-CM

## 2021-01-21 PROCEDURE — 69210 REMOVE IMPACTED EAR WAX UNI: CPT | Performed by: STUDENT IN AN ORGANIZED HEALTH CARE EDUCATION/TRAINING PROGRAM

## 2021-01-21 PROCEDURE — 31231 NASAL ENDOSCOPY DX: CPT | Performed by: STUDENT IN AN ORGANIZED HEALTH CARE EDUCATION/TRAINING PROGRAM

## 2021-01-21 PROCEDURE — 99213 OFFICE O/P EST LOW 20 MIN: CPT | Performed by: SURGERY

## 2021-01-21 PROCEDURE — 99204 OFFICE O/P NEW MOD 45 MIN: CPT | Performed by: STUDENT IN AN ORGANIZED HEALTH CARE EDUCATION/TRAINING PROGRAM

## 2021-01-21 RX ORDER — FLUTICASONE PROPIONATE 50 MCG
2 SPRAY, SUSPENSION (ML) NASAL 2 TIMES DAILY
Qty: 2 BOTTLE | Refills: 3 | Status: SHIPPED | OUTPATIENT
Start: 2021-01-21 | End: 2021-06-17

## 2021-01-21 NOTE — PROGRESS NOTES
Alex 74 gained 15 lbs over past month. Felt he got off track during the holidays with food choices and portions, also received some desserts for his birthday. Breakfast: Isopure powder (2 scoops) made with 16 oz skim milk     Snack: nothing    Lunch: (2 pm) grilled chicken tenderloin/pork    Snack: veggies     Dinner: pork tenderloin/cutlets with frozen veggies    Snack: nothing     Is pt consuming smaller portions? Inconsistent     Is pt consuming at least 64 oz of fluids per day? 96 oz    Is pt consuming carbonated, caffeinated, or sugary beverages? No - Drinking powerade zero, water, skim milk     Has pt sampled Unjury and/or Nectar protein? Yes    Exercise:  Within the last week got a virtual reality game he moves to     Plan/Recommendations:   Decrease shake in morning by 1/2    Discussed expectations after surgery with high sugar foods - holidays/birthdays   Attend SG   Continue staying active with game     Handouts: Portion Control SG emailed to pt     Jay Mcgill

## 2021-01-21 NOTE — PROGRESS NOTES
[facility]) Physicians   General & Laparoscopic Surgery  [employer]       HPI:     Lili Youssef is a very pleasant 47 y.o. male with Body mass index is 55.85 kg/m². , Pre-Surgery. Pre-operative clearance and work up pending. Working hard to keep good dietary habits as well level of activity. Patient denies any nausea, vomiting, fevers, chills, shortness of breath, chest pain, cough, constipation or difficulty urinating. Past Medical History:   Diagnosis Date    Anxiety     Chronic GERD     Depression     Influenza A 03/27/2017    Kidney stones     Obesity     Rectal bleeding     intermittently    Ulcerative colitis ([address]     Unspecified sleep apnea      Past Surgical History:   Procedure Laterality Date    ABDOMEN SURGERY Bilateral 1985    hernia repair    COLONOSCOPY  2009    tics, ulcerative colitis    COLONOSCOPY  3/2014    O'[place]-polyp x 2, diverticulosis    UPPER GASTROINTESTINAL ENDOSCOPY N/A 9/14/2020    EGD BIOPSY performed by Armida Katz DO at [address] History   Problem Relation Age of Onset    Cancer Mother     Asthma Mother     High Blood Pressure Father     Obesity Father     Cancer Maternal Grandfather     Migraines Sister     Asthma Other     Irritable Bowel Syndrome Other     Stroke Other     Diabetes Other      Social History     Tobacco Use    Smoking status: Never Smoker    Smokeless tobacco: Never Used   Substance Use Topics    Alcohol use: Yes     Comment: rarely     I counseled the patient on the importance of not smoking and risks of ETOH. No Known Allergies  Vitals:    01/21/21 0819   BP: 126/76   Pulse: 88   Weight: (!) 435 lb (197.3 kg)   Height: 6' 2\" (1.88 m)       Body mass index is 55.85 kg/m².       Current Outpatient Medications:     citalopram (CELEXA) 40 MG tablet, TAKE 1 TABLET BY MOUTH EVERY DAY, Disp: 30 tablet, Rfl: 5   buPROPion (WELLBUTRIN XL) 150 MG extended release tablet, TAKE 1 TABLET BY MOUTH EVERY MORNING, Disp: 90 tablet, Rfl: 3      Review of Systems - History obtained from the patient  General ROS: negative  Psychological ROS: negative  Endocrine ROS: negative  Respiratory ROS: negative  Cardiovascular ROS: negative  Gastrointestinal ROS:negative  Genito-Urinary ROS: negative  Musculoskeletal ROS: negative   Skin ROS: negative    Physical Exam   Vitals Reviewed   Constitutional: Patient is oriented to person, place, and time. Patient appears well-developed and well-nourished. Patient is active and cooperative. Non-toxic appearance. No distress. Neck: Trachea normal and normal range of motion. No JVD present. Pulmonary/Chest: Effort normal. No accessory muscle usage or stridor. No apnea. No respiratory distress. Cardiovascular: Normal rate and no JVD. Abdominal: Normal appearance. Patient exhibits no distension. Abdomen is soft, obese, non tender. Musculoskeletal: Normal range of motion. Patient exhibits no edema. Neurological: Patient is alert and oriented to person, place, and time. Patient has normal strength. GCS eye subscore is 4. GCS verbal subscore is 5. GCS motor subscore is 6. Skin: Skin is warm and dry. No abrasion and no rash noted. Patient is not diaphoretic. No cyanosis or erythema. Psychiatric: Patient has a normal mood and affect. Speech is normal and behavior is normal. Cognition and memory are normal.       A/P    Palomo Francisco is 47 y.o. male, Body mass index is 55.85 kg/m². pre surgery, has gained 15# since last visit. The patient underwent dietary counseling with registered dietician. I have reviewed, discussed and agree with the dietary plan. Patient is trying hard to keep good dietary and behavior modifications. Patient is monitoring portion sizes, food choices and liquid calories. Patient is trying to exercise regularly as much as possible.

## 2021-01-21 NOTE — PROGRESS NOTES
[place]      Patient Name: Lashonda Bradshaw Record Number:  [id number]  Primary Care Physician:  Gokul Alcala MD  Date of Consultation: 1/21/2021      Chief Complaint:   Chief Complaint   Patient presents with    Other     ears feel full         [facility]  Ozzy Shelton is a(n) 47 y.o. male who presents today for evaluation of ear issues. Patient states that his ears feel full and he feels like they are clogged. He will have some decreased sense of hearing when he lays down. This is intermittent. He has had this problem before. He has had his ears cleaned in the past and this seems to help his symptoms. 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 He has tried nasal sprays in the past but these did not help. He does not use sinus irrigations or any other medications in his nose. He is not getting nasal bleeding. He will get intermittent sinus infections, usually several per year. I independently reviewed the patients past medical history, past surgical history, and social history. They are unremarkable except as noted in the HPI and below. The patient denies any family history related to the current complaint, and they deny any family history of bleeding disorders or difficulties with anesthesia unless noted below.      Patient Active Problem List   Diagnosis    Morbid obesity due to excess calories (HCC)    Anxiety    LAUREN (obstructive sleep apnea)    Neutrophilic leukocytosis    Pain of left leg    Subcutaneous nodule of left lower extremity     Past Surgical History:   Procedure Laterality Date    ABDOMEN SURGERY Bilateral 1985    hernia repair    COLONOSCOPY  2009    tics, ulcerative colitis    COLONOSCOPY  3/2014    [place]-polyp x 2, diverticulosis    UPPER GASTROINTESTINAL ENDOSCOPY N/A 9/14/2020    EGD BIOPSY performed by Angelia Sorensen DO at [place] [id number] Family History   Problem Relation Age of Onset   Hope Cancer Mother     Asthma Mother     High Blood Pressure Father     Obesity Father     Cancer Maternal Grandfather     Migraines Sister     Asthma Other     Irritable Bowel Syndrome Other     Stroke Other     Diabetes Other      Social History     Tobacco Use    Smoking status: Never Smoker    Smokeless tobacco: Never Used   Substance Use Topics    Alcohol use: Yes     Comment: rarely    Drug use: No        Office Visit on 09/08/2020   Component Date Value Ref Range Status    SARS-CoV-2, GENIE 09/08/2020 NOT DETECTED  NOT DETECTED Corrected    Comment: The SARS-CoV-2 assay is a real-time RT-PCR test intended for the  qualitative detection of nucleic acid from the SARS-CoV-2 in  respiratory specimens from individuals. Testing is limited to the  guidelines of FDA Emergency Use Authorization FDA for performing  SARS-CoV-2 testing. A Non-Detected result does not preclude the possibility of 2019-nCoV  infection since the adequacy of sample collection and/or low viral  burden may result in the presence of viral nucleic acids below the  analytical sensitivity of this test method. Test results should be used  with caution and in conjunction with other clinical and laboratory data  in making a diagnosis. Performed at: [facility], [address] [place] [address]  : Sirena Trent MD          DRUG/FOOD ALLERGIES: Patient has no known allergies. CURRENT MEDICATIONS  Prior to Admission medications    Medication Sig Start Date End Date Taking?  Authorizing Provider   fluticasone (FLONASE) 50 MCG/ACT nasal spray 2 sprays by Each Nostril route 2 times daily 1/21/21 2/20/21 Yes Crystal Kwong MD   citalopram (CELEXA) 40 MG tablet TAKE 1 TABLET BY MOUTH EVERY DAY 8/3/20   Efe Bailey MD   buPROPion [facility] XL) 150 MG extended release tablet TAKE 1 TABLET BY MOUTH EVERY MORNING 4/5/20   Efe Bailey MD       REVIEW OF SYSTEMS  The following systems were reviewed and revealed the following in addition to any already discussed in the HPI:    CONSTITUTIONAL: no weight loss, no fever, no night sweats, no chills  EYES: no vision changes, no blurry vision  EARS: no changes in hearing, no otalgia  NOSE: no epistaxis, no rhinorrhea  RESPIRATORY: no  Difficulty breathing, no shortness of breath  CV: no chest pain, no Peripheral vascular disease  HEME: No coagulation disorder, no Bleeding disorder  NEURO: no TIA or stroke-like symptoms  SKIN: No new rashes in the head and neck, no recent skin cancers  MOUTH: No new ulcers, no recent teeth infections  GASTROINTESTINAL: No diarrhea, stomach pain  PSYCH: No anxiety, no depression      PHYSICAL EXAM  /77 (Site: Right Upper Arm, Position: Sitting, Cuff Size: Large Adult)   Pulse 76   Temp 98.7 °F (37.1 °C) (Temporal)   Wt (!) 438 lb (198.7 kg)   SpO2 97%   BMI 56.24 kg/m²     GENERAL: No acute distress, alert and oriented, no hoarseness, strong voice  EYES: EOMI, Anti-icteric  HENT:   Head: Normocephalic and atraumatic. Face:  Symmetric, facial nerve intact, no sinus tenderness  Right Ear: Normal external ear, normal external auditory canal, intact tympanic membrane with normal mobility and aerated middle ear  Left Ear: Normal external ear, normal external auditory canal, intact tympanic membrane with normal mobility and aerated middle ear  Mouth/Oral Cavity:  normal lips, Uvula is midline, no mucosal lesions, no trismus, normal dentition, normal salivary quality/flow  Oropharynx/Larynx:  normal oropharynx, 3+ tonsils; patient did not tolerate mirror exam due to excessive gag reflex  Nose:Normal external nasal appearance. Anterior rhinoscopy shows rightward a deviated septum preventing view posteriorly. Hypertrophy turbinates.   Normal mucosa   NECK: Normal range of motion, no thyromegaly, trachea is midline, no lymphadenopathy, no neck masses, no crepitus  CHEST: Normal border of the middle ramez, 2 = polyps reaching below the middle border of the middle turbinate, 3= large polyps reaching the lower border of the inferior turbinate or polyps medial to the middle ramez, 4= large polyps causing almost complete congestion/obstruction of the interior meatus)  Edema score = 2 (0 = absent, 1 = mild, 2 = severe)  Discharge score = 1 (0 = no discharge, 1 = clear thin discharge, 2 = thick purulent discharge)    Septum: intact and deviated to the right  Other:   -The inferior and middle turbinates were examined. The middle meatus, and sphenoethmoid recess was examined bilaterally.    -There is evidence of moderate sinonasal inflammation with inferior turbinate hypertrophy and septal deviation to the right.   -There were no complications. Tolerated well without complication. I attest that I was present for and did the entire procedure myself. ASSESSMENT/PLAN  1. Tinnitus of both ears      2. Nasal obstruction      3. Hypertrophy of both inferior nasal turbinates      4. Sensation of fullness in both ears      5. Chronic rhinitis      6. Deviated nasal septum      7. Bilateral impacted cerumen    This very pleasant 33-year-old gentleman here today for evaluation of multiple complaints related to his ears and nose. On exam he had evidence of bilateral cerumen impaction which I was able to remove successfully. He states that his hearing is improved after the cerumen was removed, so I do not think that we need to obtain an audiogram at this time. Regarding his sinonasal complaints, he has evidence of significant sinonasal inflammation with inferior turbinate hypertrophy and septal deviation on exam.  I would like to start him on 2 sprays of fluticasone twice daily as well as twice daily sinus irrigations. I will plan to see him back in 3 months to see how he is doing.   Should he not get symptomatic improvement, then we can certainly discuss other options including surgical versus continued medical management. Medical Decision Making: The following items were considered in medical decision making including or in addition to what was noted in the assessment and plan:   -Independent review of images  -Review / order clinical lab tests  -Review / order radiology tests  -Decision to obtain old records  -Review and summation of old records as accessed through [facility] and pertinent information was summarized in the note    This note was generated completely or in part utilizing Dragon dictation speech recognition software. Occasionally, words are mistranscribed and despite editing, the text may contain inaccuracies due to incorrect word recognition. If further clarification is needed please contact the office at [phone].

## 2021-02-18 ENCOUNTER — TELEMEDICINE (OUTPATIENT)
Dept: BARIATRICS/WEIGHT MGMT | Age: 41
End: 2021-02-18
Payer: COMMERCIAL

## 2021-02-18 DIAGNOSIS — E66.01 MORBID OBESITY WITH BMI OF 50.0-59.9, ADULT (HCC): Primary | ICD-10-CM

## 2021-02-18 DIAGNOSIS — G47.33 OSA (OBSTRUCTIVE SLEEP APNEA): ICD-10-CM

## 2021-02-18 PROCEDURE — 99213 OFFICE O/P EST LOW 20 MIN: CPT | Performed by: SURGERY

## 2021-02-18 NOTE — PROGRESS NOTES
EGD BIOPSY performed by Mahesh De Anda DO at [address] History   Problem Relation Age of Onset    Cancer Mother     Asthma Mother     High Blood Pressure Father     Obesity Father     Cancer Maternal Grandfather     Migraines Sister     Asthma Other     Irritable Bowel Syndrome Other     Stroke Other     Diabetes Other      Social History     Tobacco Use    Smoking status: Never Smoker    Smokeless tobacco: Never Used   Substance Use Topics    Alcohol use: Yes     Comment: rarely     I counseled the patient on the importance of not smoking and risks of ETOH. No Known Allergies  There were no vitals filed for this visit. There is no height or weight on file to calculate BMI.     Lab Results   Component Value Date    WBC 18.2 01/09/2019    RBC 5.08 01/09/2019    RBC 5.08 10/26/2016    HGB 14.3 01/09/2019    HCT 43.2 01/09/2019    MCV 85.2 01/09/2019    MCH 28.2 01/09/2019    MCHC 33.1 01/09/2019    MPV 7.6 01/09/2019    NEUTOPHILPCT 69.7 01/09/2019    LYMPHOPCT 21.9 01/09/2019    LYMPHOPCT 25.0 10/26/2016    MONOPCT 4.2 01/09/2019    EOSRELPCT 3.4 01/09/2019    BASOPCT 0.8 01/09/2019    NEUTROABS 12.7 01/09/2019    LYMPHSABS 4.0 01/09/2019    MONOSABS 0.8 01/09/2019    EOSABS 0.6 01/09/2019     Lab Results   Component Value Date     01/09/2019    K 4.1 01/09/2019     01/09/2019    CO2 22 01/09/2019    ANIONGAP 15 01/09/2019    GLUCOSE 97 01/09/2019    BUN 13 01/09/2019    CREATININE 0.6 01/09/2019    LABGLOM >60 01/09/2019    GFRAA >60 01/09/2019    GFRAA >60 11/27/2012    CALCIUM 9.5 01/09/2019    PROT 8.1 01/09/2019    LABALBU 3.8 01/09/2019    AGRATIO 0.9 01/09/2019    BILITOT 0.3 01/09/2019    ALKPHOS 97 01/09/2019    ALT 52 01/09/2019    AST 24 01/09/2019    GLOB 4.3 01/09/2019     Lab Results   Component Value Date    CHOL 148 10/10/2016    TRIG 70 10/10/2016    HDL 43 10/10/2016    LDLCALC 91 10/10/2016    LABVLDL 14 10/10/2016     No results found for: JARRETT PARKER [facility] No results found for: IRON, TIBC, LABIRON  No results found for: DQGNBFTL07, FOLATE  No results found for: VITD25  Lab Results   Component Value Date    LABA1C 5.8 03/28/2018    .8 03/28/2018         Current Outpatient Medications:     fluticasone (FLONASE) 50 MCG/ACT nasal spray, 2 sprays by Each Nostril route 2 times daily, Disp: 2 Bottle, Rfl: 3    citalopram (CELEXA) 40 MG tablet, TAKE 1 TABLET BY MOUTH EVERY DAY, Disp: 30 tablet, Rfl: 5    buPROPion (WELLBUTRIN XL) 150 MG extended release tablet, TAKE 1 TABLET BY MOUTH EVERY MORNING, Disp: 90 tablet, Rfl: 3    Review of Systems - History obtained from the patient  General ROS: negative  Psychological ROS: negative  Ophthalmic ROS: negative  Neurological ROS: negative  ENT ROS: negative  Allergy and Immunology ROS: negative  Hematological and Lymphatic ROS: negative  Endocrine ROS: negative  Respiratory ROS: negative  Cardiovascular ROS: negative  Gastrointestinal ROS:negative  Genito-Urinary ROS: negative  Musculoskeletal ROS: negative   Skin ROS: negative       Physical Exam  Deferred       A/P    Marianne Villafuerte is 47 y.o. male, There is no height or weight on file to calculate BMI. pre surgery, has stayed weight stable since last visit. The patient underwent dietary counseling with myself / or registered dietitian. I have reviewed, discussed and agree with the dietary plan. Patient is trying hard to keep good dietary and behavior modifications. Patient is monitoring portion sizes, food choices and liquid calories. Patient is trying to exercise regularly as much as possible. We discussed how his weight affects his overall health including:  Patient Active Problem List   Diagnosis    Morbid obesity due to excess calories (HCC)    Anxiety    LAUREN (obstructive sleep apnea)    Neutrophilic leukocytosis    Pain of left leg    Subcutaneous nodule of left lower extremity    and importance of weight loss to alleviate those co morbid conditions. I encouraged the patient to continue exercise and keeping healthy eating habits. Discussed pre-op labs and work up till now. Also counseled the patient extensively on Surgery. Obesity as a disease is considered a high risk to patients overall health and should therefore be considered a high risk disease state. Total encounter time: 20 minutes, including any number of the following: Bariatric Pre/Post operative work up/protocols, review of labs, imaging, provider notes, outside hospital records, performing examination/evaluation, counseling patient and/or family, ordering medications/tests, placing referrals and communication with referring physicians, coordination of care; discussing dietary plan/recall with the patient as well with registered dietitian and documentation in the EHR. Of note, the above was done during same day of the actual patient encounter. RTC in 4 weeks  Obtain rest of pre-op work up / clearances  Healthy Diet and Exercise      Patient advised that its their responsibility to follow up for their care, studies, referrals and/or labs ordered today. Pursuant to the emergency declaration under the [facility], 1135 waiver authority and the [employer] and [employer] Act, this Virtual Visit was conducted, with patient's consent, to reduce the patient's risk of exposure to COVID-19 and provide continuity of care for an established patient. Services were provided through a video synchronous discussion virtually to substitute for in-person clinic visit. Please note that some or all of this report was generated using voice recognition software. Please notify me in case of any questions about the content of this document, as some errors in transcription may have occurred .

## 2021-02-18 NOTE — PROGRESS NOTES
Alex 74 does not have a new wt to report. Pt states he does feel better. Is pt eating at least 4 times everyday? 2-3x/day     B- nectar protein shake (1 scoop per 8oz of milk)  S- sometimes protein shake  L- same as dinner  D- grilled chicken w/ frozen veggies    Is pt eating a lean protein source with all meals and snacks? yes    Has pt decreased their portions using the plate method? getting smaller    Is pt choosing low fat/sugar free options? yes    Is pt drinking at least 64 oz of clear liquids everyday? yes - water / powerade zero / some skim milk    Has pt stopped drinking carbonation, caffeinated, and sugar sweetened beverages? yes    Has pt sampled Unjury and/or Nectar protein? yes - uses routinely     Participating in intentional exercise? yes - doing ring fit on his Nitendo Switch / 1x/day for 5-10 min 6 days a week    Plan/Recommendations:   - Continue plan  - Continue exercise    Handouts: none    Due to the COVID-19 restrictions on close contact interactions the patient's visit was conducted via telephone in [place] of a face to face visit.  The patient is here through telemedicine for their 7th presurgical.    Ivette Pope

## 2021-02-18 NOTE — PATIENT INSTRUCTIONS
Patient received dietary handouts and education.     Plan/Recommendations:   - Continue plan  - Continue exercise

## 2021-02-25 ENCOUNTER — OFFICE VISIT (OUTPATIENT)
Dept: SURGERY | Age: 41
End: 2021-02-25
Payer: COMMERCIAL

## 2021-02-25 VITALS
SYSTOLIC BLOOD PRESSURE: 136 MMHG | DIASTOLIC BLOOD PRESSURE: 78 MMHG | BODY MASS INDEX: 55.72 KG/M2 | WEIGHT: 315 LBS | TEMPERATURE: 97.1 F

## 2021-02-25 DIAGNOSIS — D17.1 LIPOMA OF ABDOMINAL WALL: Primary | ICD-10-CM

## 2021-02-25 PROCEDURE — 99242 OFF/OP CONSLTJ NEW/EST SF 20: CPT | Performed by: SURGERY

## 2021-02-25 NOTE — PROGRESS NOTES
Subjective:      Patient ID: Oskar Brown is a 47 y.o. male. HPI   Chief Complaint: lipomas  Patient referred by Dr. Jurgen Caba for evaluation of lipomas. Patient reports symptoms of discomfort. Location of symptoms is left flank, RUQ. Symptoms were first noted just recently after losing weight. Alleviated by nothing. Symptoms aggravated by lying on his side. Previous evaluation includes exam by PCP. Patient has a history of morbid obesity. Being evaluated for sleeve gastrectomy with Dr. Dolly Rooney this summer. Will plan following treatment: reassurance. Consider excision during bariatric surgery        Past Medical History:   Diagnosis Date    Anxiety     Chronic GERD     Depression     Influenza A 03/27/2017    Kidney stones     Obesity     Rectal bleeding     intermittently    Ulcerative colitis ([facility] [address]     Unspecified sleep apnea        Past Surgical History:   Procedure Laterality Date    ABDOMEN SURGERY Bilateral 1985    hernia repair    COLONOSCOPY  2009    tics, ulcerative colitis    COLONOSCOPY  3/2014    [place]-polyp x 2, diverticulosis    UPPER GASTROINTESTINAL ENDOSCOPY N/A 9/14/2020    EGD BIOPSY performed by Xochitl Glaser DO at [address] ENDOSCOPY       Current Outpatient Medications   Medication Sig Dispense Refill    citalopram (CELEXA) 40 MG tablet TAKE 1 TABLET BY MOUTH EVERY DAY 30 tablet 5    buPROPion (WELLBUTRIN XL) 150 MG extended release tablet TAKE 1 TABLET BY MOUTH EVERY MORNING 90 tablet 3    fluticasone (FLONASE) 50 MCG/ACT nasal spray 2 sprays by Each Nostril route 2 times daily 2 Bottle 3     No current facility-administered medications for this visit. Prior to Admission medications    Medication Sig Start Date End Date Taking?  Authorizing Provider   citalopram (CELEXA) 40 MG tablet TAKE 1 TABLET BY MOUTH EVERY DAY 8/3/20  Yes Yovanny Lackey MD buPROPion (WELLBUTRIN XL) 150 MG extended release tablet TAKE 1 TABLET BY MOUTH EVERY MORNING 4/5/20  Yes Ayla Lorenzo MD   fluticasone [facility]) 50 MCG/ACT nasal spray 2 sprays by Each Nostril route 2 times daily 1/21/21 2/20/21  Mary Paz MD         No Known Allergies    Social History     Socioeconomic History    Marital status: Single     Spouse name: Not on file    Number of children: Not on file    Years of education: Not on file    Highest education level: Not on file   Occupational History    Not on file   Social Needs    Financial resource strain: Patient refused    Food insecurity     Worry: Patient refused     Inability: Patient refused    Transportation needs     Medical: Patient refused     Non-medical: Patient refused   Tobacco Use    Smoking status: Never Smoker    Smokeless tobacco: Never Used   Substance and Sexual Activity    Alcohol use: Yes     Comment: rarely    Drug use: No    Sexual activity: Not on file   Lifestyle    Physical activity     Days per week: Not on file     Minutes per session: Not on file    Stress: Not on file   Relationships    Social connections     Talks on phone: Not on file     Gets together: Not on file     Attends Orthodox service: Not on file     Active member of club or organization: Not on file     Attends meetings of clubs or organizations: Not on file     Relationship status: Not on file    Intimate partner violence     Fear of current or ex partner: Not on file     Emotionally abused: Not on file     Physically abused: Not on file     Forced sexual activity: Not on file   Other Topics Concern    Not on file   Social History Narrative    Not on file       Family History   Problem Relation Age of Onset    Cancer Mother     Asthma Mother     High Blood Pressure Father     Obesity Father     Cancer Maternal Grandfather     Migraines Sister     Asthma Other     Irritable Bowel Syndrome Other     Stroke Other  Diabetes Other          Review of Systems   Constitutional: Negative. Skin: Negative. Hematological: Negative. All other systems reviewed and are negative. Objective:   Physical Exam  Vitals signs reviewed. Constitutional:       General: He is not in acute distress. Appearance: He is well-developed. He is not diaphoretic. HENT:      Head: Normocephalic and atraumatic. Right Ear: External ear normal.      Left Ear: External ear normal.   Eyes:      Conjunctiva/sclera: Conjunctivae normal.      Pupils: Pupils are equal, round, and reactive to light. Neck:      Musculoskeletal: Normal range of motion and neck supple. Trachea: Trachea normal.   Cardiovascular:      Rate and Rhythm: Normal rate and regular rhythm. Heart sounds: Normal heart sounds, S1 normal and S2 normal.   Pulmonary:      Effort: Pulmonary effort is normal. No respiratory distress. Breath sounds: Normal breath sounds. No wheezing. Abdominal:      General: There is no distension. Palpations: Abdomen is soft. Musculoskeletal: Normal range of motion. Skin:     General: Skin is warm and dry. Findings: No erythema. Neurological:      Mental Status: He is alert and oriented to person, place, and time. Psychiatric:         Behavior: Behavior normal. Behavior is cooperative. Thought Content: Thought content normal.         Judgment: Judgment normal.         Assessment:       Diagnosis Orders   1. Lipoma of abdominal wall             Plan:      Discussed benign lipoma.   Mildly symptomatic  He will monitor for now and discuss with Dr. Leroy Quigley about excising during sleeve gastrectomy  Call with worsening symptoms or if desires excision sooner        Marylu Lui MD

## 2021-03-31 ENCOUNTER — OFFICE VISIT (OUTPATIENT)
Dept: BARIATRICS/WEIGHT MGMT | Age: 41
End: 2021-03-31
Payer: COMMERCIAL

## 2021-03-31 VITALS
DIASTOLIC BLOOD PRESSURE: 75 MMHG | SYSTOLIC BLOOD PRESSURE: 110 MMHG | WEIGHT: 315 LBS | BODY MASS INDEX: 40.43 KG/M2 | TEMPERATURE: 97.2 F | HEART RATE: 74 BPM | HEIGHT: 74 IN

## 2021-03-31 DIAGNOSIS — E66.01 MORBID OBESITY WITH BMI OF 50.0-59.9, ADULT (HCC): Primary | ICD-10-CM

## 2021-03-31 DIAGNOSIS — G47.33 OSA (OBSTRUCTIVE SLEEP APNEA): ICD-10-CM

## 2021-03-31 PROCEDURE — 99213 OFFICE O/P EST LOW 20 MIN: CPT | Performed by: SURGERY

## 2021-03-31 NOTE — PROGRESS NOTES
[facility]) Physicians   General & Laparoscopic Surgery  [employer]       HPI:     Chiquis Pulido is a very pleasant 47 y.o. male with Body mass index is 54.9 kg/m². , Pre-Surgery. Pre-operative clearance and work up pending. Working hard to keep good dietary habits as well level of activity. Patient denies any nausea, vomiting, fevers, chills, shortness of breath, chest pain, cough, constipation or difficulty urinating. Past Medical History:   Diagnosis Date    Anxiety     Chronic GERD     Depression     Influenza A 03/27/2017    Kidney stones     Obesity     Rectal bleeding     intermittently    Ulcerative colitis ([address]     Unspecified sleep apnea      Past Surgical History:   Procedure Laterality Date    ABDOMEN SURGERY Bilateral 1985    hernia repair    COLONOSCOPY  2009    tics, ulcerative colitis    COLONOSCOPY  3/2014    [place]-polyp x 2, diverticulosis    UPPER GASTROINTESTINAL ENDOSCOPY N/A 9/14/2020    EGD BIOPSY performed by Evette Sharp DO at [address] History   Problem Relation Age of Onset    Cancer Mother     Asthma Mother     High Blood Pressure Father     Obesity Father     Cancer Maternal Grandfather     Migraines Sister     Asthma Other     Irritable Bowel Syndrome Other     Stroke Other     Diabetes Other      Social History     Tobacco Use    Smoking status: Never Smoker    Smokeless tobacco: Never Used   Substance Use Topics    Alcohol use: Yes     Comment: rarely     I counseled the patient on the importance of not smoking and risks of ETOH. No Known Allergies  Vitals:    03/31/21 0952   BP: 110/75   Site: Right Wrist   Position: Sitting   Cuff Size: Large Adult   Pulse: 74   Temp: 97.2 °F (36.2 °C)   TempSrc: Temporal   Weight: (!) 427 lb 9.6 oz (194 kg)   Height: 6' 2\" (1.88 m)       Body mass index is 54.9 kg/m².       Current Outpatient Medications:     citalopram (CELEXA) 40 MG tablet, TAKE 1 TABLET BY MOUTH EVERY DAY, Disp: 30 tablet, Rfl: 5    buPROPion (WELLBUTRIN XL) 150 MG extended release tablet, TAKE 1 TABLET BY MOUTH EVERY MORNING, Disp: 90 tablet, Rfl: 3    fluticasone (FLONASE) 50 MCG/ACT nasal spray, 2 sprays by Each Nostril route 2 times daily, Disp: 2 Bottle, Rfl: 3      Review of Systems - History obtained from the patient  General ROS: negative  Psychological ROS: negative  Endocrine ROS: negative  Respiratory ROS: negative  Cardiovascular ROS: negative  Gastrointestinal ROS:negative  Genito-Urinary ROS: negative  Musculoskeletal ROS: negative   Skin ROS: negative    Physical Exam   Vitals Reviewed   Constitutional: Patient is oriented to person, place, and time. Patient appears well-developed and well-nourished. Patient is active and cooperative. Non-toxic appearance. No distress. Neck: Trachea normal and normal range of motion. No JVD present. Pulmonary/Chest: Effort normal. No accessory muscle usage or stridor. No apnea. No respiratory distress. Cardiovascular: Normal rate and no JVD. Abdominal: Normal appearance. Patient exhibits no distension. Abdomen is soft, obese, non tender. Musculoskeletal: Normal range of motion. Patient exhibits no edema. Neurological: Patient is alert and oriented to person, place, and time. Patient has normal strength. GCS eye subscore is 4. GCS verbal subscore is 5. GCS motor subscore is 6. Skin: Skin is warm and dry. No abrasion and no rash noted. Patient is not diaphoretic. No cyanosis or erythema. Psychiatric: Patient has a normal mood and affect. Speech is normal and behavior is normal. Cognition and memory are normal.       A/P    Jonelle Booth is 47 y.o. male, Body mass index is 54.9 kg/m². pre surgery, has lost 7.4# since last visit. The patient underwent dietary counseling with registered dietician. I have reviewed, discussed and agree with the dietary plan. Patient is trying hard to keep good dietary and behavior modifications.  Patient is monitoring portion sizes, food choices and liquid calories. Patient is trying to exercise regularly as much as possible. We discussed how his weight affects his overall health including:  Ilya was seen today for obesity. Diagnoses and all orders for this visit:    Morbid obesity with BMI of 50.0-59.9, adult (HCC)    LAUREN (obstructive sleep apnea)       and importance of weight loss to alleviate those co morbid conditions. I encouraged the patient to continue exercise and keeping healthy eating habits. Discussed pre-op labs and work up till now. Also counseled the patient extensively on Surgery. Total encounter time: 20 minutes including any number of the following: review of labs, imaging, provider notes, outside hospital records; performing examination/evaluation; counseling patient and family; ordering medications/tests; placing referrals and communication with referring physicians; coordination of care, and documentation in the EHR. RTC in 4 weeks  Obtain rest of pre-op work up / clearances  Diet and Exercise      Patient advised that its their responsibility to follow up for studies and/or labs ordered today.      Nancy Landers

## 2021-03-31 NOTE — PROGRESS NOTES
Alex 74 lost 7.4 lbs over the past month. Is pt eating at least 4 times everyday? yes he is    Is pt eating a lean protein source with all meals and snacks? yes Nectar shakes, meat    Has pt decreased their portions using the plate method? yes doing much better with portions    Is pt choosing low fat/sugar free options? yes he is    Is pt drinking at least 64 oz of clear liquids everyday? yes water 36-42oz; strongly encouraged 64oz    Has pt stopped drinking carbonation, caffeinated, and sugar sweetened beverages? yes he has; no soda in 9 months    Has pt sampled Unjury and/or Nectar protein?  yes tried and tolerated    Participating in intentional exercise? band exercises; not much - encouraged activity    Plan/Recommendations: increase fluid intake to 64oz     Handouts: none    Bárbara Collado

## 2021-04-15 ENCOUNTER — OFFICE VISIT (OUTPATIENT)
Dept: BARIATRICS/WEIGHT MGMT | Age: 41
End: 2021-04-15
Payer: COMMERCIAL

## 2021-04-15 VITALS — WEIGHT: 315 LBS | HEIGHT: 74 IN | BODY MASS INDEX: 40.43 KG/M2

## 2021-04-15 DIAGNOSIS — G47.33 OSA (OBSTRUCTIVE SLEEP APNEA): ICD-10-CM

## 2021-04-15 DIAGNOSIS — E66.01 MORBID OBESITY WITH BMI OF 50.0-59.9, ADULT (HCC): ICD-10-CM

## 2021-04-15 DIAGNOSIS — E66.01 MORBID OBESITY WITH BMI OF 50.0-59.9, ADULT (HCC): Primary | ICD-10-CM

## 2021-04-15 LAB
A/G RATIO: 1.3 (ref 1.1–2.2)
ALBUMIN SERPL-MCNC: 4.1 G/DL (ref 3.4–5)
ALP BLD-CCNC: 87 U/L (ref 40–129)
ALT SERPL-CCNC: 28 U/L (ref 10–40)
ANION GAP SERPL CALCULATED.3IONS-SCNC: 14 MMOL/L (ref 3–16)
AST SERPL-CCNC: 18 U/L (ref 15–37)
BASOPHILS ABSOLUTE: 0.1 K/UL (ref 0–0.2)
BASOPHILS RELATIVE PERCENT: 1.1 %
BILIRUB SERPL-MCNC: 0.3 MG/DL (ref 0–1)
BUN BLDV-MCNC: 10 MG/DL (ref 7–20)
CALCIUM SERPL-MCNC: 9 MG/DL (ref 8.3–10.6)
CHLORIDE BLD-SCNC: 96 MMOL/L (ref 99–110)
CHOLESTEROL, TOTAL: 156 MG/DL (ref 0–199)
CO2: 26 MMOL/L (ref 21–32)
CREAT SERPL-MCNC: 0.7 MG/DL (ref 0.9–1.3)
EOSINOPHILS ABSOLUTE: 0.5 K/UL (ref 0–0.6)
EOSINOPHILS RELATIVE PERCENT: 3.9 %
FOLATE: 4.69 NG/ML (ref 4.78–24.2)
GFR AFRICAN AMERICAN: >60
GFR NON-AFRICAN AMERICAN: >60
GLOBULIN: 3.2 G/DL
GLUCOSE BLD-MCNC: 96 MG/DL (ref 70–99)
HCT VFR BLD CALC: 40.7 % (ref 40.5–52.5)
HDLC SERPL-MCNC: 42 MG/DL (ref 40–60)
HEMOGLOBIN: 13.1 G/DL (ref 13.5–17.5)
IRON SATURATION: 22 % (ref 20–50)
IRON: 53 UG/DL (ref 59–158)
LDL CHOLESTEROL CALCULATED: 102 MG/DL
LYMPHOCYTES ABSOLUTE: 2.7 K/UL (ref 1–5.1)
LYMPHOCYTES RELATIVE PERCENT: 20 %
MCH RBC QN AUTO: 26.5 PG (ref 26–34)
MCHC RBC AUTO-ENTMCNC: 32.2 G/DL (ref 31–36)
MCV RBC AUTO: 82.3 FL (ref 80–100)
MONOCYTES ABSOLUTE: 0.6 K/UL (ref 0–1.3)
MONOCYTES RELATIVE PERCENT: 4.6 %
NEUTROPHILS ABSOLUTE: 9.6 K/UL (ref 1.7–7.7)
NEUTROPHILS RELATIVE PERCENT: 70.4 %
PDW BLD-RTO: 15.3 % (ref 12.4–15.4)
PLATELET # BLD: 484 K/UL (ref 135–450)
PMV BLD AUTO: 8.2 FL (ref 5–10.5)
POTASSIUM SERPL-SCNC: 3.8 MMOL/L (ref 3.5–5.1)
RBC # BLD: 4.94 M/UL (ref 4.2–5.9)
SODIUM BLD-SCNC: 136 MMOL/L (ref 136–145)
TOTAL IRON BINDING CAPACITY: 244 UG/DL (ref 260–445)
TOTAL PROTEIN: 7.3 G/DL (ref 6.4–8.2)
TRIGL SERPL-MCNC: 62 MG/DL (ref 0–150)
TSH REFLEX: 1.59 UIU/ML (ref 0.27–4.2)
VITAMIN B-12: 990 PG/ML (ref 211–911)
VITAMIN D 25-HYDROXY: 11.2 NG/ML
VLDLC SERPL CALC-MCNC: 12 MG/DL
WBC # BLD: 13.6 K/UL (ref 4–11)

## 2021-04-15 PROCEDURE — 99213 OFFICE O/P EST LOW 20 MIN: CPT | Performed by: SURGERY

## 2021-04-15 NOTE — PROGRESS NOTES
[facility]) Physicians   General & Laparoscopic Surgery  [employer]       HPI:     Elba Alcantara is a very pleasant 47 y.o. male with Body mass index is 54.64 kg/m². , Pre-Surgery. Pre-operative clearance and work up pending. Working hard to keep good dietary habits as well level of activity. Patient denies any nausea, vomiting, fevers, chills, shortness of breath, chest pain, cough, constipation or difficulty urinating. Past Medical History:   Diagnosis Date    Anxiety     Chronic GERD     Depression     Influenza A 03/27/2017    Kidney stones     Obesity     Rectal bleeding     intermittently    Ulcerative colitis ([facility] [address]     Unspecified sleep apnea      Past Surgical History:   Procedure Laterality Date    ABDOMEN SURGERY Bilateral 1985    hernia repair    COLONOSCOPY  2009    tics, ulcerative colitis    COLONOSCOPY  3/2014    [place]-polyp x 2, diverticulosis    UPPER GASTROINTESTINAL ENDOSCOPY N/A 9/14/2020    EGD BIOPSY performed by Melissa Sotelo DO at [address] History   Problem Relation Age of Onset    Cancer Mother     Asthma Mother     High Blood Pressure Father     Obesity Father     Cancer Maternal Grandfather     Migraines Sister     Asthma Other     Irritable Bowel Syndrome Other     Stroke Other     Diabetes Other      Social History     Tobacco Use    Smoking status: Never Smoker    Smokeless tobacco: Never Used   Substance Use Topics    Alcohol use: Yes     Comment: rarely     I counseled the patient on the importance of not smoking and risks of ETOH. No Known Allergies  Vitals:    04/15/21 1148   Weight: (!) 425 lb 9.6 oz (193.1 kg)   Height: 6' 2\" (1.88 m)       Body mass index is 54.64 kg/m².       Current Outpatient Medications:     fluticasone (FLONASE) 50 MCG/ACT nasal spray, 2 sprays by Each Nostril route 2 times daily, Disp: 2 Bottle, Rfl: 3    citalopram (CELEXA) 40 MG tablet, TAKE 1 TABLET BY MOUTH EVERY DAY, Disp: 30 tablet, Rfl: 5    buPROPion (WELLBUTRIN XL) 150 MG extended release tablet, TAKE 1 TABLET BY MOUTH EVERY MORNING, Disp: 90 tablet, Rfl: 3      Review of Systems - History obtained from the patient  General ROS: negative  Psychological ROS: negative  Endocrine ROS: negative  Respiratory ROS: negative  Cardiovascular ROS: negative  Gastrointestinal ROS:negative  Genito-Urinary ROS: negative  Musculoskeletal ROS: negative   Skin ROS: negative    Physical Exam   Vitals Reviewed   Constitutional: Patient is oriented to person, place, and time. Patient appears well-developed and well-nourished. Patient is active and cooperative. Non-toxic appearance. No distress. Neck: Trachea normal and normal range of motion. No JVD present. Pulmonary/Chest: Effort normal. No accessory muscle usage or stridor. No apnea. No respiratory distress. Cardiovascular: Normal rate and no JVD. Abdominal: Normal appearance. Patient exhibits no distension. Abdomen is soft, obese, non tender. Musculoskeletal: Normal range of motion. Patient exhibits no edema. Neurological: Patient is alert and oriented to person, place, and time. Patient has normal strength. GCS eye subscore is 4. GCS verbal subscore is 5. GCS motor subscore is 6. Skin: Skin is warm and dry. No abrasion and no rash noted. Patient is not diaphoretic. No cyanosis or erythema. Psychiatric: Patient has a normal mood and affect. Speech is normal and behavior is normal. Cognition and memory are normal.       A/P    Julienne Caldera is 47 y.o. male, Body mass index is 54.64 kg/m². pre surgery, has lost 2# since last visit. The patient underwent dietary counseling with registered dietician. I have reviewed, discussed and agree with the dietary plan. Patient is trying hard to keep good dietary and behavior modifications. Patient is monitoring portion sizes, food choices and liquid calories. Patient is trying to exercise regularly as much as possible.   We discussed how his

## 2021-04-16 LAB
ESTIMATED AVERAGE GLUCOSE: 116.9 MG/DL
HBA1C MFR BLD: 5.7 %

## 2021-04-19 ENCOUNTER — OFFICE VISIT (OUTPATIENT)
Dept: PULMONOLOGY | Age: 41
End: 2021-04-19
Payer: COMMERCIAL

## 2021-04-19 VITALS
SYSTOLIC BLOOD PRESSURE: 128 MMHG | RESPIRATION RATE: 16 BRPM | TEMPERATURE: 96.7 F | HEIGHT: 74 IN | BODY MASS INDEX: 40.43 KG/M2 | HEART RATE: 81 BPM | WEIGHT: 315 LBS | DIASTOLIC BLOOD PRESSURE: 72 MMHG | OXYGEN SATURATION: 97 %

## 2021-04-19 DIAGNOSIS — G47.33 OSA (OBSTRUCTIVE SLEEP APNEA): ICD-10-CM

## 2021-04-19 PROCEDURE — 99203 OFFICE O/P NEW LOW 30 MIN: CPT | Performed by: INTERNAL MEDICINE

## 2021-04-19 ASSESSMENT — SLEEP AND FATIGUE QUESTIONNAIRES
HOW LIKELY ARE YOU TO NOD OFF OR FALL ASLEEP WHILE SITTING INACTIVE IN A PUBLIC PLACE: 0
HOW LIKELY ARE YOU TO NOD OFF OR FALL ASLEEP WHILE WATCHING TV: 0
HOW LIKELY ARE YOU TO NOD OFF OR FALL ASLEEP WHILE SITTING AND READING: 0
HOW LIKELY ARE YOU TO NOD OFF OR FALL ASLEEP WHILE SITTING AND TALKING TO SOMEONE: 0
ESS TOTAL SCORE: 2

## 2021-04-19 NOTE — PROGRESS NOTES
REASON FOR CONSULTATION/CC: Lauren       PCP: Cyndi Fisher MD    HISTORY OF PRESENT ILLNESS: Randall Adames is a 47y.o. year old male with a history of LAUREN who presents :     Sleep history:     LAUREN  Using cpap nightly > 4 hours per day. No issues. No complaints mask fit or humidification issues. Knows to changes mask and hoses every 6 months. No hypersomnia  Is use. [place] Sleepiness Scale:    Sleep Medicine 4/19/2021 6/22/2017 12/13/2016   Sitting and reading 0 1 1   Watching TV 0 0 1   Sitting, inactive in a public place (e.g. a theatre or a meeting) 0 0 0   As a passenger in a car for an hour without a break 0 0 0   Lying down to rest in the afternoon when circumstances permit 2 2 2   Sitting and talking to someone 0 0 0   Sitting quietly after a lunch without alcohol 0 0 1   In a car, while stopped for a few minutes in traffic 0 0 0   Total score 2 3 5   Neck circumference - 20.75 21         0 = no chance of dozing  1 = slight chance of dozing  2 = moderate chance of dozing  3 = high chance of dozing    Interpretation:   0-7: It is unlikely that you are abnormally sleepy. 8-9:You have an average amount of daytime sleepiness. 10-15: You may be excessively sleepy depending on the situation. You may want to consider   seeking medical attention. 16-24: You are excessively sleepy and should consider seeking medical attention      PAST MEDICAL HISTORY:  Past Medical History:   Diagnosis Date    Anxiety     Chronic GERD     Depression     Influenza A 03/27/2017    Kidney stones     Obesity     Rectal bleeding     intermittently    Ulcerative colitis ([address]     Unspecified sleep apnea        PAST SURGICAL HISTORY:  Past Surgical History:   Procedure Laterality Date    ABDOMEN SURGERY Bilateral 1985    hernia repair    COLONOSCOPY  2009    tics, ulcerative colitis    COLONOSCOPY  3/2014    O'[place]yl-polyp x 2, diverticulosis    UPPER GASTROINTESTINAL ENDOSCOPY N/A 9/14/2020    EGD BIOPSY performed by Sheila Jiménez DO at [address]:  family history includes Asthma in his mother and another family member; Cancer in his maternal grandfather and mother; Diabetes in an other family member; High Blood Pressure in his father; Irritable Bowel Syndrome in an other family member; Migraines in his sister; Obesity in his father; Stroke in an other family member. SOCIAL HISTORY:   reports that he has never smoked. He has never used smokeless tobacco.      ALLERGIES:  Patient has No Known Allergies. REVIEW OF SYSTEMS:  Constitutional: Negative for fever    HENT: Negative for sore throat  Eyes: Negative for redness   Respiratory: Negative for dyspnea, cough  Cardiovascular: Negative for chest pain  Gastrointestinal: Negative for vomiting, diarrhea   Genitourinary: Negative for hematuria   Musculoskeletal: Negative for arthralgias   Skin: Negative for rash  Neurological: Negative for syncope  Hematological: Negative for adenopathy  Psychiatric/Behavorial: Negative for anxiety    Objective:   PHYSICAL EXAM:  Blood pressure 128/72, pulse 81, temperature 96.7 °F (35.9 °C), temperature source Temporal, resp. rate 16, height 6' 2\" (1.88 m), weight (!) 424 lb (192.3 kg), SpO2 97 %.'  Body mass index is 54.44 kg/m². Gen: No distress. Eyes: PERRL. No sclera icterus. No conjunctival injection. ENT: + discharge. Pharynx clear. External appearance of ears and nose normal. Mallampati   4  Neck: Trachea midline. No obvious mass. Resp: No accessory muscle use. No crackles. No wheezes. No rhonchi. CV: Regular rate. Regular rhythm. No murmur or rub. No edema. Lymph: No cervical LAD. No supraclavicular LAD. M/S: No cyanosis. No clubbing. No joint deformity. Neuro: Moves all four extremities. Psych: Oriented x 3. No anxiety. Awake. Alert. Intact judgement and insight.     Current Outpatient Medications   Medication Sig Dispense Refill    citalopram reviewed 3+    Category 2 Data points:    Radiology Review:  Independent interpretation of no acute disease     Category 3 Data points:    Discussed management or interpretation of test with external provider:       Assessment:     ·  LAUREN  · Hypersomnia  · Dry mouth  · Chronic rhinitis    Plan:         Problem List Items Addressed This Visit     LAUREN (obstructive sleep apnea)     Robinson Shelter  is deriving benefit from PAP demonstrated by improved [place], AHI, symptoms. Using cpap nightly > 4 hours per day. No issues. No complaints mask fit or humidification issues. Knows to changes mask and hoses every 6 months.          PAP download information:  Usage > 4 hours  100 %   Pressure setting  17.3 cwp    AHI with usage  5.3         He is clear for weight loss surgery from pulmonary / sleep stand point

## 2021-04-19 NOTE — ASSESSMENT & PLAN NOTE
Donald Cristobal  is deriving benefit from PAP demonstrated by improved [place], AHI, symptoms. Using cpap nightly > 4 hours per day. No issues. No complaints mask fit or humidification issues. Knows to changes mask and hoses every 6 months.          PAP download information:  Usage > 4 hours  100 %   Pressure setting  17.3 cwp    AHI with usage  5.3         He is clear for weight loss surgery from pulmonary / sleep stand point

## 2021-04-19 NOTE — LETTER
[facility]   [address] [facility] [facility]  Phone: [phone]  Fax: [phone]     4/19/2021    Dr. Maritza Sherwood MD and Dr. Dannielle Abdullahi    Today had the pleasure to see our mutual patient, Julienne Caldera. My office note is attached. Please let me know if you have any questions.         Sincerely,    Esmer  Pulmonary, Sleep and Critical Care  [phone]

## 2021-04-27 ENCOUNTER — OFFICE VISIT (OUTPATIENT)
Dept: ENT CLINIC | Age: 41
End: 2021-04-27
Payer: COMMERCIAL

## 2021-04-27 VITALS
HEIGHT: 74 IN | BODY MASS INDEX: 40.43 KG/M2 | SYSTOLIC BLOOD PRESSURE: 147 MMHG | TEMPERATURE: 98 F | HEART RATE: 80 BPM | DIASTOLIC BLOOD PRESSURE: 93 MMHG | WEIGHT: 315 LBS

## 2021-04-27 DIAGNOSIS — H61.23 BILATERAL IMPACTED CERUMEN: ICD-10-CM

## 2021-04-27 DIAGNOSIS — J34.3 HYPERTROPHY OF BOTH INFERIOR NASAL TURBINATES: ICD-10-CM

## 2021-04-27 DIAGNOSIS — J34.89 NASAL OBSTRUCTION: ICD-10-CM

## 2021-04-27 DIAGNOSIS — H93.8X3 SENSATION OF FULLNESS IN BOTH EARS: ICD-10-CM

## 2021-04-27 DIAGNOSIS — H93.13 TINNITUS OF BOTH EARS: Primary | ICD-10-CM

## 2021-04-27 DIAGNOSIS — J31.0 CHRONIC RHINITIS: ICD-10-CM

## 2021-04-27 DIAGNOSIS — J34.2 DEVIATED NASAL SEPTUM: ICD-10-CM

## 2021-04-27 PROCEDURE — 69210 REMOVE IMPACTED EAR WAX UNI: CPT | Performed by: STUDENT IN AN ORGANIZED HEALTH CARE EDUCATION/TRAINING PROGRAM

## 2021-04-27 PROCEDURE — 99214 OFFICE O/P EST MOD 30 MIN: CPT | Performed by: STUDENT IN AN ORGANIZED HEALTH CARE EDUCATION/TRAINING PROGRAM

## 2021-04-27 RX ORDER — AZELASTINE 1 MG/ML
2 SPRAY, METERED NASAL 2 TIMES DAILY
Qty: 4 BOTTLE | Refills: 1 | Status: SHIPPED | OUTPATIENT
Start: 2021-04-27 | End: 2021-06-17

## 2021-04-30 RX ORDER — CITALOPRAM 40 MG/1
TABLET ORAL
Qty: 30 TABLET | Refills: 5 | Status: SHIPPED | OUTPATIENT
Start: 2021-04-30 | End: 2021-11-08 | Stop reason: SDUPTHER

## 2021-05-03 NOTE — PROGRESS NOTES
[place]      Patient Name: Lashonda Bradshaw Record Number:  [id number]  Primary Care Physician:  Luz De La Vega MD  Date of Consultation: 4/27/2021      Chief Complaint:   Chief Complaint   Patient presents with    Follow-up     ears feel full again         [facility]  Anant Santamaria is a(n) 47 y.o. male who presents today for evaluation of ear issues. Patient states that his ears feel full and he feels like they are clogged. He will have some decreased sense of hearing when he lays down. This is intermittent. He has had this problem before. He has had his ears cleaned in the past and this seems to help his symptoms. 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 He has tried nasal sprays in the past but these did not help. He does not use sinus irrigations or any other medications in his nose. He is not getting nasal bleeding. He will get intermittent sinus infections, usually several per year. I independently reviewed the patients past medical history, past surgical history, and social history. They are unremarkable except as noted in the HPI and below. The patient denies any family history related to the current complaint, and they deny any family history of bleeding disorders or difficulties with anesthesia unless noted below. Update 4/27/2021:    Patient presents today for follow-up. He has been using the nasal steroid sprays and irrigations. He feels like this helped him somewhat. However he is still having some clear nasal drainage and nasal obstruction. He also feels like his ears are still clogged up. He feels like his hearing is down.     Patient Active Problem List   Diagnosis    Morbid obesity due to excess calories (HCC)    Anxiety    LAUREN (obstructive sleep apnea)    Neutrophilic leukocytosis    Pain of left leg    Subcutaneous nodule of left lower Nonpregnant Adults: <7.0%                    Pregnant: <6.0%        eAG 04/15/2021 116.9  mg/dL Final    Sodium 04/15/2021 136  136 - 145 mmol/L Final    Potassium 04/15/2021 3.8  3.5 - 5.1 mmol/L Final    Chloride 04/15/2021 96* 99 - 110 mmol/L Final    CO2 04/15/2021 26  21 - 32 mmol/L Final    Anion Gap 04/15/2021 14  3 - 16 Final    Glucose 04/15/2021 96  70 - 99 mg/dL Final    BUN 04/15/2021 10  7 - 20 mg/dL Final    CREATININE 04/15/2021 0.7* 0.9 - 1.3 mg/dL Final    GFR Non- 04/15/2021 >60  >60 Final    Comment: >60 mL/min/1.73m2 EGFR, calc. for ages 25 and older using the  MDRD formula (not corrected for weight), is valid for stable  renal function.  GFR  04/15/2021 >60  >60 Final    Comment: Chronic Kidney Disease: less than 60 ml/min/1.73 sq.m. Kidney Failure: less than 15 ml/min/1.73 sq.m. Results valid for patients 18 years and older.       Calcium 04/15/2021 9.0  8.3 - 10.6 mg/dL Final    Total Protein 04/15/2021 7.3  6.4 - 8.2 g/dL Final    Albumin 04/15/2021 4.1  3.4 - 5.0 g/dL Final    Albumin/Globulin Ratio 04/15/2021 1.3  1.1 - 2.2 Final    Total Bilirubin 04/15/2021 0.3  0.0 - 1.0 mg/dL Final    Alkaline Phosphatase 04/15/2021 87  40 - 129 U/L Final    ALT 04/15/2021 28  10 - 40 U/L Final    AST 04/15/2021 18  15 - 37 U/L Final    Globulin 04/15/2021 3.2  g/dL Final    WBC 04/15/2021 13.6* 4.0 - 11.0 K/uL Final    RBC 04/15/2021 4.94  4.20 - 5.90 M/uL Final    Hemoglobin 04/15/2021 13.1* 13.5 - 17.5 g/dL Final    Hematocrit 04/15/2021 40.7  40.5 - 52.5 % Final    MCV 04/15/2021 82.3  80.0 - 100.0 fL Final    MCH 04/15/2021 26.5  26.0 - 34.0 pg Final    MCHC 04/15/2021 32.2  31.0 - 36.0 g/dL Final    RDW 04/15/2021 15.3  12.4 - 15.4 % Final    Platelets 54/51/7102 484* 135 - 450 K/uL Final    MPV 04/15/2021 8.2  5.0 - 10.5 fL Final    Neutrophils % 04/15/2021 70.4  % Final    Lymphocytes % 04/15/2021 20.0  % Final    Monocytes % 04/15/2021 4.6  % Final    Eosinophils % 04/15/2021 3.9  % Final    Basophils % 04/15/2021 1.1  % Final    Neutrophils Absolute 04/15/2021 9.6* 1.7 - 7.7 K/uL Final    Lymphocytes Absolute 04/15/2021 2.7  1.0 - 5.1 K/uL Final    Monocytes Absolute 04/15/2021 0.6  0.0 - 1.3 K/uL Final    Eosinophils Absolute 04/15/2021 0.5  0.0 - 0.6 K/uL Final    Basophils Absolute 04/15/2021 0.1  0.0 - 0.2 K/uL Final        DRUG/FOOD ALLERGIES: Patient has no known allergies. CURRENT MEDICATIONS  Prior to Admission medications    Medication Sig Start Date End Date Taking?  Authorizing Provider   azelastine (ASTELIN) 0.1 % nasal spray 2 sprays by Nasal route 2 times daily Use in each nostril as directed 4/27/21  Yes Ruben Branch MD   citalopram (CELEXA) 40 MG tablet TAKE 1 TABLET BY MOUTH EVERY DAY 4/30/21   Janis Haskins MD   fluticasone [facility]) 50 MCG/ACT nasal spray 2 sprays by Each Nostril route 2 times daily 1/21/21 4/15/21  Ruben Branch MD   buPROPion (WELLBUTRIN XL) 150 MG extended release tablet TAKE 1 TABLET BY MOUTH EVERY MORNING 4/5/20   Janis Haskins MD       REVIEW OF SYSTEMS  The following systems were reviewed and revealed the following in addition to any already discussed in the HPI:    CONSTITUTIONAL: no weight loss, no fever, no night sweats, no chills  EYES: no vision changes, no blurry vision  EARS: no changes in hearing, no otalgia  NOSE: no epistaxis, no rhinorrhea  RESPIRATORY: no  Difficulty breathing, no shortness of breath  CV: no chest pain, no Peripheral vascular disease  HEME: No coagulation disorder, no Bleeding disorder  NEURO: no TIA or stroke-like symptoms  SKIN: No new rashes in the head and neck, no recent skin cancers  MOUTH: No new ulcers, no recent teeth infections  GASTROINTESTINAL: No diarrhea, stomach pain  PSYCH: No anxiety, no depression      PHYSICAL EXAM  BP (!) 147/93 (Site: Right Upper Arm, Position: Sitting, Cuff Size: Medium Adult)   Pulse 80 Temp 98 °F (36.7 °C) (Temporal)   Ht 6' 2\" (1.88 m)   Wt (!) 426 lb (193.2 kg)   BMI 54.70 kg/m²     GENERAL: No acute distress, alert and oriented, no hoarseness, strong voice  EYES: EOMI, Anti-icteric  HENT:   Head: Normocephalic and atraumatic. Face:  Symmetric, facial nerve intact, no sinus tenderness  Right Ear: Normal external ear, normal external auditory canal, intact tympanic membrane with normal mobility and aerated middle ear  Left Ear: Normal external ear, normal external auditory canal, intact tympanic membrane with normal mobility and aerated middle ear  Mouth/Oral Cavity:  normal lips, Uvula is midline, no mucosal lesions, no trismus, normal dentition, normal salivary quality/flow  Oropharynx/Larynx:  normal oropharynx, 3+ tonsils; patient did not tolerate mirror exam due to excessive gag reflex  Nose:Normal external nasal appearance. Anterior rhinoscopy shows rightward a deviated septum preventing view posteriorly. Hypertrophy turbinates. Normal mucosa   NECK: Normal range of motion, no thyromegaly, trachea is midline, no lymphadenopathy, no neck masses, no crepitus  CHEST: Normal respiratory effort, no retractions, breathing comfortably  SKIN: No rashes, normal appearing skin, no evidence of skin lesions/tumors  Neuro:  cranial nerve II-XII intact; normal gait  Cardio:  no edema    Impaction bilateral external auditory canals with cerumen preventing visualization of the TM    PROCEDURE    Use of Operating Microscope and Cerumen Removal CPT code 66643  Indications:  Bilateral cerumen impaction obstructing visualization of the tympanic membrane(s). An operating microscope was utilized to visualize the external auditory canals using a speculum. The external auditory canals were occluded with cerumen bilaterally. The cerumen and debris was removed with instrumentation including suction and currettes under microscopic evaluation. The bilateral tympanic membrane(s) and ossicles are intact. No fluid was visualized in the bilateral middle ears. Nasal Endoscopy (CPT code 31423) from initial visit    Preop: chronic rhinitis  Postop: Same    Verbal consent was received. After topical anesthesia and decongestion had been obtained using aerosolized 1% lidocaine and oxymetazoline, a 45 degree rigid endoscope was placed into both nares with the patient in a sitting position. The following was observed:    Right Nasal Cavity and Paranasal Sinuses:  Polyp score = 0 (0 = no polyps, 1 = small polyps in middle meatus not reaching below the inferior border of the middle ramez, 2 = polyps reaching below the middle border of the middle turbinate, 3= large polyps reaching the lower border of the inferior turbinate or polyps medial to the middle ramez, 4= large polyps causing almost complete congestion/obstruction of the interior meatus)  Edema score = 2 (0 = absent, 1 = mild, 2 = severe)  Discharge score = 1 (0 = no discharge, 1 = clear thin discharge, 2 = thick purulent discharge)    Left Nasal Cavity and Paranasal Sinuses:    Polyp score = 0 (0 = no polyps, 1 = small polyps in middle meatus not reaching below the inferior border of the middle ramez, 2 = polyps reaching below the middle border of the middle turbinate, 3= large polyps reaching the lower border of the inferior turbinate or polyps medial to the middle ramez, 4= large polyps causing almost complete congestion/obstruction of the interior meatus)  Edema score = 2 (0 = absent, 1 = mild, 2 = severe)  Discharge score = 1 (0 = no discharge, 1 = clear thin discharge, 2 = thick purulent discharge)    Septum: intact and deviated to the right  Other:   -The inferior and middle turbinates were examined. The middle meatus, and sphenoethmoid recess was examined bilaterally.    -There is evidence of moderate sinonasal inflammation with inferior turbinate hypertrophy and septal deviation to the right.   -There were no complications.         Tolerated well without complication. I attest that I was present for and did the entire procedure myself. ASSESSMENT/PLAN  1. Tinnitus of both ears      2. Nasal obstruction      3. Hypertrophy of both inferior nasal turbinates      4. Sensation of fullness in both ears      5. Chronic rhinitis      6. Deviated nasal septum      7. Bilateral impacted cerumen    This very pleasant 29-year-old gentleman here today for evaluation of multiple complaints related to his ears and nose. On exam he had evidence of recurrent bilateral cerumen impaction which I was able to remove successfully. He states that his hearing is improved after the cerumen was removed, so I do not think that we need to obtain an audiogram at this time. He has been tolerating nasal steroid sprays and sinus irrigations. I would like to add azelastine to see if this helps his symptoms. He does have evidence of a septal deviation and inferior turbinate hypertrophy. He is still very symptomatic and having difficulty breathing through his nose. He does wear a CPAP at night. We did discuss that he would be a good candidate for a septoplasty and inferior turbinate reduction. He would like to try the spray and if it does not improve then we consider the above-noted procedure. Medical Decision Making: The following items were considered in medical decision making including or in addition to what was noted in the assessment and plan:   -Independent review of images  -Review / order clinical lab tests  -Review / order radiology tests  -Decision to obtain old records  -Review and summation of old records as accessed through [address] [place] [address] and pertinent information was summarized in the note    This note was generated completely or in part utilizing Dragon dictation speech recognition software. Occasionally, words are mistranscribed and despite editing, the text may contain inaccuracies due to incorrect word recognition.   If further clarification is needed please contact the office at [phone].

## 2021-05-20 ENCOUNTER — OFFICE VISIT (OUTPATIENT)
Dept: BARIATRICS/WEIGHT MGMT | Age: 41
End: 2021-05-20
Payer: COMMERCIAL

## 2021-05-20 VITALS — BODY MASS INDEX: 40.43 KG/M2 | WEIGHT: 315 LBS | HEIGHT: 74 IN

## 2021-05-20 DIAGNOSIS — E66.01 MORBID OBESITY WITH BMI OF 50.0-59.9, ADULT (HCC): Primary | ICD-10-CM

## 2021-05-20 DIAGNOSIS — G47.33 OSA (OBSTRUCTIVE SLEEP APNEA): ICD-10-CM

## 2021-05-20 PROCEDURE — 99214 OFFICE O/P EST MOD 30 MIN: CPT | Performed by: SURGERY

## 2021-05-20 NOTE — PROGRESS NOTES
[facility]) Physicians   General & Laparoscopic Surgery  [employer]       HPI:     Ruthann Lujan is a very pleasant 47 y.o. male with Body mass index is 53.87 kg/m². , Pre-Surgery. Pre-operative clearance and work up pending. Working hard to keep good dietary habits as well level of activity. Patient denies any nausea, vomiting, fevers, chills, shortness of breath, chest pain, cough, constipation or difficulty urinating. Past Medical History:   Diagnosis Date    Anxiety     Chronic GERD     Depression     Influenza A 03/27/2017    Kidney stones     Obesity     Rectal bleeding     intermittently    Ulcerative colitis ([facility] [address]     Unspecified sleep apnea      Past Surgical History:   Procedure Laterality Date    ABDOMEN SURGERY Bilateral 1985    hernia repair    COLONOSCOPY  2009    tics, ulcerative colitis    COLONOSCOPY  3/2014    [place]-polyp x 2, diverticulosis    UPPER GASTROINTESTINAL ENDOSCOPY N/A 9/14/2020    EGD BIOPSY performed by Sheila Jiménez DO at [address] [facility] History   Problem Relation Age of Onset    Cancer Mother     Asthma Mother     High Blood Pressure Father     Obesity Father     Cancer Maternal Grandfather     Migraines Sister     Asthma Other     Irritable Bowel Syndrome Other     Stroke Other     Diabetes Other      Social History     Tobacco Use    Smoking status: Never Smoker    Smokeless tobacco: Never Used   Substance Use Topics    Alcohol use: Yes     Comment: rarely     I counseled the patient on the importance of not smoking and risks of ETOH. No Known Allergies  Vitals:    05/20/21 1213   Weight: (!) 419 lb 9.6 oz (190.3 kg)   Height: 6' 2\" (1.88 m)       Body mass index is 53.87 kg/m².       Current Outpatient Medications:     citalopram (CELEXA) 40 MG tablet, TAKE 1 TABLET BY MOUTH EVERY DAY, Disp: 30 tablet, Rfl: 5    azelastine (ASTELIN) 0.1 % nasal spray, 2 sprays by Nasal route 2 times daily Use in each nostril as directed, Disp: 4 Bottle, Rfl: 1    buPROPion (WELLBUTRIN XL) 150 MG extended release tablet, TAKE 1 TABLET BY MOUTH EVERY MORNING, Disp: 90 tablet, Rfl: 3    fluticasone (FLONASE) 50 MCG/ACT nasal spray, 2 sprays by Each Nostril route 2 times daily, Disp: 2 Bottle, Rfl: 3      Review of Systems - History obtained from the patient  General ROS: negative  Psychological ROS: negative  Endocrine ROS: negative  Respiratory ROS: negative  Cardiovascular ROS: negative  Gastrointestinal ROS:negative  Genito-Urinary ROS: negative  Musculoskeletal ROS: negative   Skin ROS: negative    Physical Exam   Vitals Reviewed   Constitutional: Patient is oriented to person, place, and time. Patient appears well-developed and well-nourished. Patient is active and cooperative. Non-toxic appearance. No distress. Neck: Trachea normal and normal range of motion. No JVD present. Pulmonary/Chest: Effort normal. No accessory muscle usage or stridor. No apnea. No respiratory distress. Cardiovascular: Normal rate and no JVD. Abdominal: Normal appearance. Patient exhibits no distension. Abdomen is soft, obese, non tender. Musculoskeletal: Normal range of motion. Patient exhibits no edema. Neurological: Patient is alert and oriented to person, place, and time. Patient has normal strength. GCS eye subscore is 4. GCS verbal subscore is 5. GCS motor subscore is 6. Skin: Skin is warm and dry. No abrasion and no rash noted. Patient is not diaphoretic. No cyanosis or erythema. Psychiatric: Patient has a normal mood and affect. Speech is normal and behavior is normal. Cognition and memory are normal.       A/P    [place] is 47 y.o. male, Body mass index is 53.87 kg/m². pre surgery, has lost 6# since last visit. The patient underwent dietary counseling with registered dietician. I have reviewed, discussed and agree with the dietary plan. Patient is trying hard to keep good dietary and behavior modifications.  Patient is monitoring portion sizes, food choices and liquid calories. Patient is trying to exercise regularly as much as possible. We discussed how his weight affects his overall health including:  Rhoda Mendez was seen today for obesity. Diagnoses and all orders for this visit:    Morbid obesity with BMI of 50.0-59.9, adult (HCC)    LAUREN (obstructive sleep apnea)       and importance of weight loss to alleviate those co morbid conditions. I encouraged the patient to continue exercise and keeping healthy eating habits. Discussed pre-op labs and work up till now. Also counseled the patient extensively on Surgery. Total encounter time: 30 minutes including any number of the following: review of labs, imaging, provider notes, outside hospital records; performing examination/evaluation; counseling patient and family; ordering medications/tests; placing referrals and communication with referring physicians; coordination of care, and documentation in the EHR. Following The Metabolic and Bariatric Surgery Accreditation and Quality Improvement Program [facility]), and [employer] (ACS) recommendations, the Bariatric Surgical Risk/Benefit Calculator was used, and report was discussed with patient, who wishes to proceed with surgery, fully understanding the risks and benefits. Of note, [facility] Bariatric Surgical Risk/Benefit Calculator estimates the chance of an unfavorable outcome (such as a complication or death), the chance of remission of weight-related comorbidities, and the patient's BMI, weight change, and percent total weight change after surgery. These quantities are estimated based upon information the patient gives the healthcare provider about prior health history. The estimates are calculated using data from a large number of patients who had a primary bariatric surgical procedure similar to the one the patient may have.   Please note the risk percentages, remission percentages, BMI, weight change, and percent total weight change provided to you by the risk/benefit calculator are only estimates. These estimates only take certain information into account. There may be other factors that are not included in the estimate which may increase or decrease the risk of a complication, the chance of remission of a weight-related comorbidity, or the amount of weight the patient loses. These estimates are not a guarantee of results. A complication after surgery may happen even if the risk is low, a weight-related comorbidity may not go into remission even if the chances are high, and a patient may lose more or less weight than predicted. This information is not intended to replace the advice of a doctor or healthcare provider about the diagnosis, treatment, or potential outcomes. The Provider is not responsible for medical decisions that may be made by the patient based on the risk/benefit calculator estimates, since these estimates are provided for informational purposes. Patients should always consult their doctor or other health care provider before deciding on a treatment plan. Both open and laparoscopic approach were explained in details. Benefits and risks explained. Informed consent obtained. Risks including but not limited to; Infection, bleeding, gastric leak or obstruction, persistent nausea, vomiting, or reflux, injury to surrounding structures, risks of anesthesia, stricture, delayed gastric emptying, staple line leak, incisional hernia, malnutrition , hair loss, and/ or Conversion to Open surgery may be necessary. Failure to lose or maintain weight loss, Gallstones or Kidney Stones, Deep Venous Thrombosis , pulmonary embolism and / or death. I did explain thoroughly to the patient that compliance with pre- and post op diet and other recommendations are integral part to improve the chances of successful weight loss and also not following it could end with serious health complications. heart, bowel, vagus nerve or gastric injuries. However that will be determined intra-operatively, if not safe to proceed, then any additional procedures will have to be addressed later as primary goal is bariatric surgery.]     Patient understands that there may be a need to perform other urgent or necessary procedures that were unanticipated. Patient consents to the performance of any additional procedures determined during the original procedure to be in their best interests and where delay might cause additional harm or put patient at risk for additional anesthesia risk for required by a second procedure and that will be determined by the surgeon. Patient is aware if Weight gain occurs in pre-op period while on pre-operative diet or  non compliant with it , surgery will be canceled. Patient understands that in relation to the COVID-19 pandemic and surgical procedures, they have been given the opportunity to postpone   surgery until a  later date, and has chosen to proceed with surgery knowing the risks associated with COVID-19. Patient was satisfied with the discussion we had and had no further questions at end of visit and wants to proceed with surgery. 1- Pre-operative work up ordered for you(labs/x-rays/EKG). 2- Stop taking Blood thinners,one week before surgery. 3- F/U with PCP for pre-op Medical Clearance. 4- Plan for Robotic Sleeve, possible other indicated procedures. Patient advised that its their responsibility to follow up for studies, referrals and/or labs ordered today.            Dipika Franco

## 2021-05-20 NOTE — PROGRESS NOTES
Alex 74 lost 6 lbs over past month. Has been working hard to increase water intake, decrease portions and increase PA. Breakfast: cup of milk OR egg with red peppers    Snack: egg with red peppers     Lunch: frozen veggies with grilled chicken OR chicken     Snack: egg with red peppers OR string cheese OR veggies     Dinner: frozen veggies with grilled chicken    Snack: nothing     Is pt consuming smaller portions? Decreased portions     Is pt consuming at least 64 oz of fluids per day? Yes - increased water intake to 96 oz/day    Is pt consuming carbonated, caffeinated, or sugary beverages? No - Increased to 96 oz of water, skim milk (2-3 coffee cups - doesn't measure size) to keep full, powerade zero, crystal light     Has pt sampled Unjury and/or Nectar protein?  Yes    Exercise: band exercises and playing [employer] go - walking 1-4 miles/day when he plays on his off days (off 3-4 days/week)     Plan/Recommendations:   Continue with walking   Watch liquid calories - limit to 2 cups of milk per day   Focus on protein consistently with all snacks     Handouts: none     Babs Mercado, GOPI, LD

## 2021-05-27 PROBLEM — E55.9 VITAMIN D DEFICIENCY: Status: ACTIVE | Noted: 2021-05-27

## 2021-05-27 PROBLEM — R73.03 PREDIABETES: Status: ACTIVE | Noted: 2021-05-27

## 2021-05-27 ASSESSMENT — ENCOUNTER SYMPTOMS
COUGH: 0
SHORTNESS OF BREATH: 0
RESPIRATORY NEGATIVE: 1
ALLERGIC/IMMUNOLOGIC NEGATIVE: 1
EYES NEGATIVE: 1
GASTROINTESTINAL NEGATIVE: 1

## 2021-05-27 NOTE — PROGRESS NOTES
[facility]) Physicians   [employer]    Subjective:      Patient ID: Jovanna Nelson is a 47 y.o. male    HPI    The patient is here for their bariatric surgery preoperative education group visit. He has made several attempts at weight loss in the past without success and now has been scheduled to have bariatric surgery on July 6, 2021 for future weight loss. He is working to change his dietary behaviors and lose weight to improve comorbid conditions such as prediabetes obstructive sleep apnea. Jovanna Nelson is a very pleasant 47 y.o. male with Body mass index is 53.87 kg/m². Past Medical History:   Diagnosis Date    Anxiety     Chronic GERD     Depression     Influenza A 03/27/2017    Kidney stones     Obesity     Rectal bleeding     intermittently    Ulcerative colitis ([facility] [address]     Unspecified sleep apnea      Past Surgical History:   Procedure Laterality Date    ABDOMEN SURGERY Bilateral 1985    hernia repair    COLONOSCOPY  2009    tics, ulcerative colitis    COLONOSCOPY  3/2014    O'Darke-polyp x 2, diverticulosis    UPPER GASTROINTESTINAL ENDOSCOPY N/A 9/14/2020    EGD BIOPSY performed by Jhoan Tuttle DO at [address] History   Problem Relation Age of Onset    Cancer Mother     Asthma Mother     High Blood Pressure Father     Obesity Father     Cancer Maternal Grandfather     Migraines Sister     Asthma Other     Irritable Bowel Syndrome Other     Stroke Other     Diabetes Other      Social History     Tobacco Use    Smoking status: Never Smoker    Smokeless tobacco: Never Used   Substance Use Topics    Alcohol use: Not Currently     Comment: rarely     I counseled the patient on the importance of not smoking and risks of ETOH. No Known Allergies  Vitals:    06/08/21 1413   Weight: (!) 419 lb 9.6 oz (190.3 kg)   Height: 6' 2\" (1.88 m)     Body mass index is 53.87 kg/m².     Current Outpatient Medications:     buPROPion (WELLBUTRIN XL) 150 MG extended release tablet, TAKE 1 TABLET BY MOUTH EVERY MORNING, Disp: 90 tablet, Rfl: 3    citalopram (CELEXA) 40 MG tablet, TAKE 1 TABLET BY MOUTH EVERY DAY, Disp: 30 tablet, Rfl: 5    azelastine (ASTELIN) 0.1 % nasal spray, 2 sprays by Nasal route 2 times daily Use in each nostril as directed, Disp: 4 Bottle, Rfl: 1    fluticasone (FLONASE) 50 MCG/ACT nasal spray, 2 sprays by Each Nostril route 2 times daily, Disp: 2 Bottle, Rfl: 3    Lab Results   Component Value Date    WBC 13.6 04/15/2021    RBC 4.94 04/15/2021    RBC 5.08 10/26/2016    HGB 13.1 04/15/2021    HCT 40.7 04/15/2021    MCV 82.3 04/15/2021    MCH 26.5 04/15/2021    MCHC 32.2 04/15/2021    MPV 8.2 04/15/2021    NEUTOPHILPCT 70.4 04/15/2021    LYMPHOPCT 20.0 04/15/2021    LYMPHOPCT 25.0 10/26/2016    MONOPCT 4.6 04/15/2021    EOSRELPCT 3.9 04/15/2021    BASOPCT 1.1 04/15/2021    NEUTROABS 9.6 04/15/2021    LYMPHSABS 2.7 04/15/2021    MONOSABS 0.6 04/15/2021    EOSABS 0.5 04/15/2021     Lab Results   Component Value Date     04/15/2021    K 3.8 04/15/2021    CL 96 04/15/2021    CO2 26 04/15/2021    ANIONGAP 14 04/15/2021    GLUCOSE 96 04/15/2021    BUN 10 04/15/2021    CREATININE 0.7 04/15/2021    LABGLOM >60 04/15/2021    GFRAA >60 04/15/2021    GFRAA >60 11/27/2012    CALCIUM 9.0 04/15/2021    PROT 7.3 04/15/2021    LABALBU 4.1 04/15/2021    AGRATIO 1.3 04/15/2021    BILITOT 0.3 04/15/2021    ALKPHOS 87 04/15/2021    ALT 28 04/15/2021    AST 18 04/15/2021    GLOB 3.2 04/15/2021     Lab Results   Component Value Date    CHOL 156 04/15/2021    TRIG 62 04/15/2021    HDL 42 04/15/2021    LDLCALC 102 04/15/2021    LABVLDL 12 04/15/2021     Lab Results   Component Value Date    TSHREFLEX 1.59 04/15/2021     Lab Results   Component Value Date    IRON 53 04/15/2021    TIBC 244 04/15/2021    LABIRON 22 04/15/2021     Lab Results   Component Value Date    FGMYOQWG09 990 04/15/2021    FOLATE 4.69 04/15/2021     Lab Results   Component Value Date VITD25 11.2 04/15/2021     Lab Results   Component Value Date    LABA1C 5.7 04/15/2021    .9 04/15/2021       Review of Systems   Constitutional: Negative. Negative for chills, fatigue and fever. HENT: Negative. Eyes: Negative. Respiratory: Negative. Negative for cough and shortness of breath. Cardiovascular: Negative. Gastrointestinal: Negative. Endocrine: Negative. Genitourinary: Negative. Musculoskeletal: Negative. Skin: Negative. Allergic/Immunologic: Negative. Neurological: Negative. Hematological: Negative. Psychiatric/Behavioral: Negative. Objective:     Physical Exam  Vitals reviewed. Constitutional:       Appearance: He is well-developed. HENT:      Head: Normocephalic and atraumatic. Eyes:      Conjunctiva/sclera: Conjunctivae normal.      Pupils: Pupils are equal, round, and reactive to light. Pulmonary:      Effort: Pulmonary effort is normal.   Abdominal:      Palpations: Abdomen is soft. Musculoskeletal:         General: Normal range of motion. Cervical back: Normal range of motion and neck supple. Skin:     General: Skin is warm and dry. Neurological:      Mental Status: He is alert and oriented to person, place, and time. Psychiatric:         Behavior: Behavior normal.         Thought Content: Thought content normal.         Judgment: Judgment normal.       Assessment and Plan:   Patient is here for their preoperative education group visit for sleeve gastrectomy. The patient is down 0 lbs today. The patient's current Body mass index is 53.87 kg/m². (6/8/21). He is making good dietary and behavior modifications and is considered to be a good surgical candidate. Patient has a diagnosis of prediabetes. Reviewed the importance of complying with post op diet. Patient has a diagnosis of obstructive sleep apnea and uses a CPAP for sleep. Discussed that weight loss can assist with improving sleep apnea symptoms.  Explained to patient to make sure they bring their CPAP with them to the hospital for their surgery. Patient received instructions from the registered dietitian in reference to the two week preoperative diet and the four phases of their postoperative diet. In addition I reviewed these instructions and stressed the importance of following these recommendations for their safety. Patient completed the preoperative class where they were provided with education related to their bariatric surgery, common surgical complications, medications preoperatively & postoperatively, special concerns related to bariatric surgery postoperatively, vitamin supplementation, patient agreement, PAT & scheduling, hospital course, wellness discovery program and what to do in the case of an emergency postoperatively. The dietitians reviewed all preoperative and postoperative diet instructions. Patient was given the opportunity to ask questions during the group visit and these questions were answered by myself and/or the dietitian. I spent a total of 40 minutes on the day of the visit and over half of that time was spent counseling the patient on proper dietary behaviors, exercise and surgery protocols.

## 2021-06-01 RX ORDER — BUPROPION HYDROCHLORIDE 150 MG/1
150 TABLET ORAL EVERY MORNING
Qty: 90 TABLET | Refills: 3 | Status: SHIPPED | OUTPATIENT
Start: 2021-06-01 | End: 2021-06-17 | Stop reason: SDUPTHER

## 2021-06-02 ENCOUNTER — TELEPHONE (OUTPATIENT)
Dept: BARIATRICS/WEIGHT MGMT | Age: 41
End: 2021-06-02

## 2021-06-02 NOTE — PROGRESS NOTES
Patient Name:   Juliano Scott       Type of Surgery:  Sleeve         Date of Surgery:  7/6/21        Start Pre-Op Diet On:  6/23/21       Start Clear Liquids On:  7/5/21        If you are having a gastric bypass, start Bowel Prep between 2-4pm the day prior to surgery. NPO after midnight the night before your surgery! Take morning medications with a small amount of water.     NOTES:_______________________________________  ______________________________________________

## 2021-06-07 NOTE — TELEPHONE ENCOUNTER
[facility] Approval sleeve [id number]  Admission date 7/6/21  2 days approved  Auth #-[id number]

## 2021-06-08 ENCOUNTER — OFFICE VISIT (OUTPATIENT)
Dept: BARIATRICS/WEIGHT MGMT | Age: 41
End: 2021-06-08
Payer: COMMERCIAL

## 2021-06-08 VITALS — WEIGHT: 315 LBS | HEIGHT: 74 IN | BODY MASS INDEX: 40.43 KG/M2

## 2021-06-08 DIAGNOSIS — R73.03 PREDIABETES: ICD-10-CM

## 2021-06-08 DIAGNOSIS — E66.01 MORBID OBESITY WITH BMI OF 50.0-59.9, ADULT (HCC): ICD-10-CM

## 2021-06-08 DIAGNOSIS — G47.33 OSA (OBSTRUCTIVE SLEEP APNEA): Primary | ICD-10-CM

## 2021-06-08 PROCEDURE — 99214 OFFICE O/P EST MOD 30 MIN: CPT | Performed by: NURSE PRACTITIONER

## 2021-06-17 DIAGNOSIS — R39.11 URINARY HESITANCY: ICD-10-CM

## 2021-06-17 LAB
BILIRUBIN URINE: NEGATIVE
BLOOD, URINE: NEGATIVE
CLARITY: CLEAR
COLOR: YELLOW
GLUCOSE URINE: NEGATIVE MG/DL
KETONES, URINE: NEGATIVE MG/DL
LEUKOCYTE ESTERASE, URINE: NEGATIVE
MICROSCOPIC EXAMINATION: NORMAL
NITRITE, URINE: NEGATIVE
PH UA: 6 (ref 5–8)
PROTEIN UA: NEGATIVE MG/DL
SPECIFIC GRAVITY UA: 1.02 (ref 1–1.03)
URINE REFLEX TO CULTURE: NORMAL
URINE TYPE: NORMAL
UROBILINOGEN, URINE: 1 E.U./DL

## 2021-06-28 ENCOUNTER — TELEPHONE (OUTPATIENT)
Dept: BARIATRICS/WEIGHT MGMT | Age: 41
End: 2021-06-28

## 2021-06-28 NOTE — TELEPHONE ENCOUNTER
Pt called stating he is feeling tired, shaky and kind of sore on the pre-op diet. Pt is day 5 on the diet. Discussed most of these symptoms are not uncommon especially the first few days on the diet. Pt is eating all food options, making protein shakes w/ skim milk & drinking at least 64oz of fluid. Discussed trying broth for savory taste & comfort. Overall pt did say he is feeling a little better today. Advised pt to continue plan, monitor symptoms and to contact us if he does not continue to improve or gets significantly worse. Pt also wanted to inform Dr. Jie Verduzco that he started an antibiotic on Friday for a prostate infection, prescribed for 14 days.

## 2021-06-29 RX ORDER — SCOLOPAMINE TRANSDERMAL SYSTEM 1 MG/1
1 PATCH, EXTENDED RELEASE TRANSDERMAL
Status: CANCELLED | OUTPATIENT
Start: 2021-06-29

## 2021-06-29 RX ORDER — SODIUM CHLORIDE 9 MG/ML
INJECTION, SOLUTION INTRAVENOUS CONTINUOUS
Status: CANCELLED | OUTPATIENT
Start: 2021-06-29

## 2021-07-01 DIAGNOSIS — Z01.818 PRE-OP EXAM: ICD-10-CM

## 2021-07-01 LAB
A/G RATIO: 1.1 (ref 1.1–2.2)
ABO/RH: NORMAL
ALBUMIN SERPL-MCNC: 4.3 G/DL (ref 3.4–5)
ALP BLD-CCNC: 87 U/L (ref 40–129)
ALT SERPL-CCNC: 42 U/L (ref 10–40)
ANION GAP SERPL CALCULATED.3IONS-SCNC: 14 MMOL/L (ref 3–16)
ANTIBODY SCREEN: NORMAL
AST SERPL-CCNC: 26 U/L (ref 15–37)
BILIRUB SERPL-MCNC: 0.3 MG/DL (ref 0–1)
BUN BLDV-MCNC: 14 MG/DL (ref 7–20)
CALCIUM SERPL-MCNC: 10.2 MG/DL (ref 8.3–10.6)
CHLORIDE BLD-SCNC: 103 MMOL/L (ref 99–110)
CO2: 24 MMOL/L (ref 21–32)
CREAT SERPL-MCNC: 0.6 MG/DL (ref 0.9–1.3)
GFR AFRICAN AMERICAN: >60
GFR NON-AFRICAN AMERICAN: >60
GLOBULIN: 3.8 G/DL
GLUCOSE BLD-MCNC: 85 MG/DL (ref 70–99)
POTASSIUM SERPL-SCNC: 4.8 MMOL/L (ref 3.5–5.1)
REASON FOR REJECTION: NORMAL
REJECTED TEST: NORMAL
SODIUM BLD-SCNC: 141 MMOL/L (ref 136–145)
TOTAL PROTEIN: 8.1 G/DL (ref 6.4–8.2)

## 2021-07-01 NOTE — PROGRESS NOTES
Spoke with Maynor Kingsley at Dr Ady Hewitt per chat, patient needs ekg and labs repeated. Several attempts to reach patient and lmor to return call, patient does not return calls, Maynor Kingsley aware.

## 2021-07-01 NOTE — PROGRESS NOTES
PATIENT HAVING TESTING TODAY AT 1400 WITH PCP.     MESSAGE SENT TO MITA AT  [facility] AWARE PATIENT IS ON CIPRO FOR PROSTATITIS

## 2021-07-01 NOTE — PROGRESS NOTES
PRE-OP INSTRUCTIONS FOR THE SURGICAL PATIENT YOU ARE UNABLE TO MAKE CONTACT FOR AN INTERVIEW:    All patients having surgery or anesthesia are required to be Covid tested OR to have been vaccinated at least 14 days prior to your procedure. It is very important to return our call to [phone] to notify the staff of your last vaccination date or you will be required to complete Covid testing within the 5-6 days prior to surgery & quarantine. We recommend coming to the [facility] [employer]. flu tent to complete. It is open Monday-Friday 8am-3pm currently. If you go outside [facility] [employer] you need to ensure you have a PCR Covid test and fax results to PreAdmission Testing at [phone]. 1. Follow all instructions provided to you from your surgeons office, including your ARRIVAL TIME. 2. Enter the MAIN entrance located on [address] and report to the desk. 3. Bring your insurance & photo ID with you. You may also be asked to pay a co-pay, as you may want to bring a check or credit card with you. 4. Leave all other valuables at home. 5. Arrange for someone to drive you home and be with you for the first 24 hours after discharge. 6. Bring your medication list with you day of surgery with doses and frequency listed (including over the counter medications)  7. You must contact your surgeon for Instructions regarding:              - ALL medication instructions, especially if taking blood thinners, aspirin, or diabetic medication.  - IF  there is a change in your physical condition such as a cold, fever, rash, cuts, sores or any other infection, especially near your surgical site. 8. A Pre-op History and Physical for surgery MUST be completed by your Physician or an Urgent Care within 30 days of your procedure date. Please bring a copy with you on the day of your procedure and along with any other testing performed. 9. DO NOT EAT ANYTHING eight hours prior to arrival for surgery. May have up to 8 ounces of water 4 hours prior to arrival for surgery. NOTE: ALL Gastric, Bariatric and Bowel surgery patients MUST follow their surgeon's instructions. 10. No gum, candy, mints, or ice chips day of procedure. 11. Please refrain from drinking alcohol the day before or day of your procedure. 12. Please do not smoke the day of your procedure. 13. Dress in loose, comfortable clothing appropriate for redressing after your procedure. Do not wear jewelry (including body piercings), make-up, fingernail polish, lotion, powders or metal hairclips. 15. Contacts will need to be removed prior to surgery. You may want to bring your eye glasses to wear immediately before and after surgery. 14. Dentures will need to be removed before your procedure. 13. Bring cases for your glasses, contacts, dentures, or hearing aids to protect them while you are in surgery. 16. If you use a CPAP, please bring it with you on the day of your procedure. 17. Do not shave or wax for 72 hours prior to procedure near your operative site  18. FOR WOMAN OF CHILDBEARING AGE ONLY- please make sure we can collect a urine sample on arrival.     If you have further questions, you may contact your surgeon's office or us at [phone]     Left instructions on patient's voicemail.     Carmelita Diaz RN.7/1/2021 .7:50 AM

## 2021-07-01 NOTE — PROGRESS NOTES
[facility] patients having surgery or anesthesia are required to be Covid tested OR to have been vaccinated at least 14 days prior to your procedure. It is very important to return our call to [phone] to notify the staff of your last vaccination date or you will be required to complete Covid testing within the 5-6 days prior to surgery & quarantine. We recommend coming to the [facility] [employer]. flu tent to complete. It is open Monday-Friday 8am-3pm currently. If you go outside [facility] [employer] you need to ensure you have a PCR Covid test and fax results to PreAdmission Testing at [phone]. PRIOR TO PROCEDURE DATE:        1. PLEASE FOLLOW ANY  GUIDELINES/ INSTRUCTIONS PRIOR TO YOUR PROCEDURE AS ADVISED BY YOUR SURGEON. 2. Arrange for someone to drive you home and be with you for the first 24 hours after discharge for your safety after your procedure for which you received sedation. Ensure it is someone we can share information with regarding your discharge. 3. You must contact your surgeon for instructions IF:   You are taking any blood thinners, aspirin, anti-inflammatory or vitamin E.   There is a change in your physical condition such as a cold, fever, rash, cuts, sores or any other infection, especially near your surgical site. 4. Do not drink alcohol the day before or day of your procedure. 5. A Pre-op History and Physical for surgery MUST be completed by your Physician or Urgent Care within 30 days of your procedure date. Please bring a copy with you on the day of your procedure and along with any other testing performed. THE DAY OF YOUR PROCEDURE:  1. Follow instructions for ARRIVAL TIME as DIRECTED BY YOUR SURGEON. 2. Enter the MAIN entrance from [email or web address] and follow the signs to the free [employer] or [address] parking (offered free of charge 6am-5pm).             3. Enter the Main Entrance of the hospital (do not enter from the lower level of the parking garage). Upon entrance, check in with the  at the main desk on your left. If no one is available at the desk, proceed into the [facility] Waiting Room and go through the door directly into the [facility]. There is a Check-in desk ACROSS from Room 5 (marked with a sign hanging from the ceiling). The phone number for the surgery center is [phone]. 4. Please call [phone] option #2 option #2 if you have not been preregistered yet. On the day of your procedure bring your insurance card and photo ID. You will be registered at your bedside once brought back to your room. 5. DO NOT EAT ANYTHING eight hours prior to your arrival for surgery. May have 8 ounces of water 4 hours prior to your arrival for surgery. NOTE: ALL Gastric, Bariatric and Bowel surgery patients MUST follow their surgeon's instructions. 6. MEDICATIONS    Take the following medications with a SMALL sip of water: NONE   Use your usual dose of inhalers the morning of surgery. BRING your rescue inhaler with you to hospital.    Anesthesia does NOT want you to take insulin the morning of surgery. They will control your blood sugar while you are at the hospital. Please contact your ordering physician for instructions regarding your insulin the night before your procedure. If you have an insulin pump, please keep it set on basal rate. 7. Do not swallow water when brushing teeth. No gum, candy, mints or ice chips. Refrain from smoking or at least decrease the amount. 8. Dress in loose, comfortable clothing appropriate for redressing after your procedure. Do not wear jewelry (including body piercings), make-up (especially NO eye make-up), fingernail polish (NO toenail polish if foot/leg surgery), lotion, powders or metal hairclips. 9. Dentures, glasses, or contacts will need to be removed before your procedure.  Bring cases for your glasses, contacts, dentures, or hearing aids to protect them while you are in surgery. 10. If you use a CPAP, please bring it with you on the day of your procedure. 11. We recommend that valuable personal  belongings such as cash, cell phones, e-tablets or jewelry, be left at home during your stay. The hospital will not be responsible for valuables that are not secured in the hospital safe. However, if your insurance requires a co-pay, you may want to bring a method of payment, i.e. Check or credit card, if you wish to pay your co-pay the day of surgery. 12. If you are to stay overnight, you may bring a bag with personal items. Please have any large items you may need brought in by your family after your arrival to your hospital room. 15. If you have a Living Will or Durable Power of , please bring a copy on the day of your procedure. 15. With your permission, one family member may accompany you while you are being prepared for surgery. Once you are ready, additional family members may join you. HOW WE KEEP YOU SAFE and WORK TO PREVENT SURGICAL SITE INFECTIONS:  1. Health care workers should always check your ID bracelet to verify your name and birth date. You will be asked many times to state your name, date of birth, and allergies. 2. Health care workers should always clean their hands with soap or alcohol gel before providing care to you. It is okay to ask anyone if they cleaned their hands before they touch you. 3. You will be actively involved in verifying the type of procedure you are having and ensuring the correct surgical site. This will be confirmed multiple times prior to your procedure. Do NOT lola your surgery site UNLESS instructed to by your surgeon. 4. Do not shave or wax for 72 hours prior to procedure near your operative site. Shaving with a razor can irritate your skin and make it easier to develop an infection.  On the day of your procedure, any hair that needs to be removed near the surgical site will be clipped by a healthcare worker using a special clippers designed to avoid skin irritation. 5. When you are in the operating room, your surgical site will be cleansed with a special soap, and in most cases, you will be given an antibiotic before the surgery begins. What to expect AFTER YOUR PROCEDURE:  1. Immediately following your procedure, your will be taken to the PACU for the first phase of your recovery. Your nurse will help you recover from any potential side effects of anesthesia, such as extreme drowsiness, changes in your vital signs or breathing patterns. Nausea, headache, muscle aches, or sore throat may also occur after anesthesia. Your nurse will help you manage these potential side effects. 2. For comfort and safety, arrange to have someone at home with you for the first 24 hours after discharge. 3. You and your family will be given written instructions about your diet, activity, dressing care, medications, and return visits. 4. Once at home, should issues with nausea, pain, or bleeding occur, or should you notice any signs of infection, you should call your surgeon. 5. Always clean your hands before and after caring for your wound. Do not let your family touch your surgery site without cleaning their hands. 6. Narcotic pain medications can cause significant constipation. You may want to add a stool softener to your postoperative medication schedule or speak to your surgeon on how best to manage this SIDE EFFECT. SPECIAL INSTRUCTIONS     Thank you for allowing us to care for you. We strive to exceed your expectations in the delivery of care and service provided to you and your family. If you need to contact the [facility] staff for any reason, please call us at [phone]    Instructions reviewed with patient during preadmission testing phone interview.   Sharita Matias RN.7/1/2021 .37:33 AM      ADDITIONAL EDUCATIONAL INFORMATION REVIEWED PER PHONE WITH YOU AND/OR YOUR FAMILY:  No Hibiclens® Bathing Instructions   Yes Antibacterial Soap

## 2021-07-02 ENCOUNTER — TELEPHONE (OUTPATIENT)
Dept: BARIATRICS/WEIGHT MGMT | Age: 41
End: 2021-07-02

## 2021-07-02 ENCOUNTER — ANESTHESIA EVENT (OUTPATIENT)
Dept: OPERATING ROOM | Age: 41
DRG: 621 | End: 2021-07-02
Payer: COMMERCIAL

## 2021-07-02 LAB
BASOPHILS ABSOLUTE: 0.1 K/UL (ref 0–0.2)
BASOPHILS RELATIVE PERCENT: 0.8 %
EOSINOPHILS ABSOLUTE: 0.4 K/UL (ref 0–0.6)
EOSINOPHILS RELATIVE PERCENT: 2.7 %
HCT VFR BLD CALC: 40.4 % (ref 40.5–52.5)
HEMOGLOBIN: 13.3 G/DL (ref 13.5–17.5)
LYMPHOCYTES ABSOLUTE: 2.1 K/UL (ref 1–5.1)
LYMPHOCYTES RELATIVE PERCENT: 15.1 %
MCH RBC QN AUTO: 26.9 PG (ref 26–34)
MCHC RBC AUTO-ENTMCNC: 33 G/DL (ref 31–36)
MCV RBC AUTO: 81.7 FL (ref 80–100)
MONOCYTES ABSOLUTE: 0.7 K/UL (ref 0–1.3)
MONOCYTES RELATIVE PERCENT: 5.2 %
NEUTROPHILS ABSOLUTE: 10.7 K/UL (ref 1.7–7.7)
NEUTROPHILS RELATIVE PERCENT: 76.2 %
PDW BLD-RTO: 15.5 % (ref 12.4–15.4)
PLATELET # BLD: 437 K/UL (ref 135–450)
PMV BLD AUTO: 8.7 FL (ref 5–10.5)
RBC # BLD: 4.95 M/UL (ref 4.2–5.9)
WBC # BLD: 14.1 K/UL (ref 4–11)

## 2021-07-02 NOTE — TELEPHONE ENCOUNTER
Spoke with Viviana Mcdaniel to confirm his 5:45 am arrival for his 7:45 am surgery on 7/6/21. Pt stated he is doing his pre op diet, will do the clear liquid diet on Monday and be NPO after midnight. Because his PCP office contacted the office questioning the order for an incentive spirometer (see previous phone note), it was also explained to the patient as well. Pt voices his understanding.

## 2021-07-02 NOTE — PROGRESS NOTES
[facility] [employer] / [facility] ([facility]) [place], [address]    Acknowledgment of Informed Consent for Surgical or Medical Procedure and Sedation  I agree to allow doctor(s)  Chantel Mccord and his/her associates or assistants, including residents and/or other qualified medical practitioner to perform the following medical treatment or procedure and to administer or direct the administration of sedation as necessary:  Procedure(s): ROBOTIC [address] [place] [address]    My doctor has explained the following regarding the proposed procedure:   the explanation of the procedure   the benefits of the procedure   the potential problems that might occur during recuperation   the risks and side effects of the procedure which could include but are not limited to severe blood loss, infection, stroke or death   the benefits, risks and side effect of alternative procedures including the consequences of declining this procedure or any alternative procedures   the likelihood of achieving satisfactory results. I acknowledge no guarantee or assurance has been made to me regarding the results. I understand that during the course of this treatment/procedure, unforeseen conditions can occur which require an additional or different procedure. I agree to allow my physician or assistants to perform such extension of the original procedure as they may find necessary. I understand that sedation will often result in temporary impairment of memory and fine motor skills and that sedation can occasionally progress to a state of deep sedation or general anesthesia. I understand the risks of anesthesia for surgery include, but are not limited to, sore throat, hoarseness, injury to face, mouth, or teeth; nausea; headache; injury to blood vessels or nerves; death, brain damage, or paralysis.     I understand that if I have a Limitation of Treatment order in effect during my hospitalization, the order may or may not be in effect during this procedure. I give my doctor permission to give me blood or blood products. I understand that there are risks with receiving blood such as hepatitis, AIDS, fever, or allergic reaction. I acknowledge that the risks, benefits, and alternatives of this treatment have been explained to me and that no express or implied warranty has been given by the hospital, any blood bank, or any person or entity as to the blood or blood components transfused. At the discretion of my doctor, I agree to allow observers, equipment/product representatives and allow photographing, and/or televising of the procedure, provided my name or identity is maintained confidentially. I agree the hospital may dispose of or use for scientific or educational purposes any tissue, fluid, or body parts which may be removed.     ________________________________Date________Time______ am/pm  ([place])  Patient or Signature of Closest Relative or Legal Guardian    ________________________________Date________Time______am/pm      Page 1 of  1  Witness

## 2021-07-06 ENCOUNTER — ANESTHESIA (OUTPATIENT)
Dept: OPERATING ROOM | Age: 41
DRG: 621 | End: 2021-07-06
Payer: COMMERCIAL

## 2021-07-06 ENCOUNTER — HOSPITAL ENCOUNTER (INPATIENT)
Age: 41
LOS: 2 days | Discharge: HOME | DRG: 621 | End: 2021-07-08
Attending: SURGERY | Admitting: SURGERY
Payer: COMMERCIAL

## 2021-07-06 VITALS — SYSTOLIC BLOOD PRESSURE: 121 MMHG | DIASTOLIC BLOOD PRESSURE: 59 MMHG | OXYGEN SATURATION: 99 %

## 2021-07-06 DIAGNOSIS — R10.9 ACUTE POSTOPERATIVE PAIN OF ABDOMEN: Primary | ICD-10-CM

## 2021-07-06 DIAGNOSIS — E66.01 MORBID OBESITY (HCC): ICD-10-CM

## 2021-07-06 DIAGNOSIS — G89.18 ACUTE POSTOPERATIVE PAIN OF ABDOMEN: Primary | ICD-10-CM

## 2021-07-06 LAB
ABO/RH: NORMAL
ANTIBODY SCREEN: NORMAL
GLUCOSE BLD-MCNC: 85 MG/DL (ref 70–99)
HCT VFR BLD CALC: 40.1 % (ref 40.5–52.5)
HEMOGLOBIN: 13.2 G/DL (ref 13.5–17.5)
MCH RBC QN AUTO: 27.1 PG (ref 26–34)
MCHC RBC AUTO-ENTMCNC: 32.8 G/DL (ref 31–36)
MCV RBC AUTO: 82.5 FL (ref 80–100)
PDW BLD-RTO: 15.5 % (ref 12.4–15.4)
PERFORMED ON: NORMAL
PLATELET # BLD: 462 K/UL (ref 135–450)
PMV BLD AUTO: 8 FL (ref 5–10.5)
RBC # BLD: 4.86 M/UL (ref 4.2–5.9)
WBC # BLD: 13.6 K/UL (ref 4–11)

## 2021-07-06 PROCEDURE — 2580000003 HC RX 258: Performed by: SURGERY

## 2021-07-06 PROCEDURE — 6370000000 HC RX 637 (ALT 250 FOR IP): Performed by: SURGERY

## 2021-07-06 PROCEDURE — 2500000003 HC RX 250 WO HCPCS: Performed by: SURGERY

## 2021-07-06 PROCEDURE — 94761 N-INVAS EAR/PLS OXIMETRY MLT: CPT

## 2021-07-06 PROCEDURE — 2580000003 HC RX 258: Performed by: ANESTHESIOLOGY

## 2021-07-06 PROCEDURE — 6360000002 HC RX W HCPCS: Performed by: NURSE ANESTHETIST, CERTIFIED REGISTERED

## 2021-07-06 PROCEDURE — 3700000001 HC ADD 15 MINUTES (ANESTHESIA): Performed by: SURGERY

## 2021-07-06 PROCEDURE — 32662 THORACOSCOPY W/MEDIAST EXC: CPT | Performed by: SURGERY

## 2021-07-06 PROCEDURE — 6360000002 HC RX W HCPCS: Performed by: NURSE PRACTITIONER

## 2021-07-06 PROCEDURE — 6360000002 HC RX W HCPCS: Performed by: SURGERY

## 2021-07-06 PROCEDURE — 2580000003 HC RX 258: Performed by: NURSE PRACTITIONER

## 2021-07-06 PROCEDURE — 2720000010 HC SURG SUPPLY STERILE: Performed by: SURGERY

## 2021-07-06 PROCEDURE — 3700000000 HC ANESTHESIA ATTENDED CARE: Performed by: SURGERY

## 2021-07-06 PROCEDURE — 2700000000 HC OXYGEN THERAPY PER DAY

## 2021-07-06 PROCEDURE — 86850 RBC ANTIBODY SCREEN: CPT

## 2021-07-06 PROCEDURE — 2500000003 HC RX 250 WO HCPCS: Performed by: NURSE ANESTHETIST, CERTIFIED REGISTERED

## 2021-07-06 PROCEDURE — 8E0W4CZ ROBOTIC ASSISTED PROCEDURE OF TRUNK REGION, PERCUTANEOUS ENDOSCOPIC APPROACH: ICD-10-PCS | Performed by: SURGERY

## 2021-07-06 PROCEDURE — 6370000000 HC RX 637 (ALT 250 FOR IP): Performed by: NURSE PRACTITIONER

## 2021-07-06 PROCEDURE — S2900 ROBOTIC SURGICAL SYSTEM: HCPCS | Performed by: SURGERY

## 2021-07-06 PROCEDURE — 6360000002 HC RX W HCPCS: Performed by: ANESTHESIOLOGY

## 2021-07-06 PROCEDURE — 94660 CPAP INITIATION&MGMT: CPT

## 2021-07-06 PROCEDURE — 3600000019 HC SURGERY ROBOT ADDTL 15MIN: Performed by: SURGERY

## 2021-07-06 PROCEDURE — 1200000000 HC SEMI PRIVATE

## 2021-07-06 PROCEDURE — 7100000000 HC PACU RECOVERY - FIRST 15 MIN: Performed by: SURGERY

## 2021-07-06 PROCEDURE — 86900 BLOOD TYPING SEROLOGIC ABO: CPT

## 2021-07-06 PROCEDURE — 85027 COMPLETE CBC AUTOMATED: CPT

## 2021-07-06 PROCEDURE — C1713 ANCHOR/SCREW BN/BN,TIS/BN: HCPCS | Performed by: SURGERY

## 2021-07-06 PROCEDURE — 0DB64Z3 EXCISION OF STOMACH, PERCUTANEOUS ENDOSCOPIC APPROACH, VERTICAL: ICD-10-PCS | Performed by: SURGERY

## 2021-07-06 PROCEDURE — 43775 LAP SLEEVE GASTRECTOMY: CPT | Performed by: SURGERY

## 2021-07-06 PROCEDURE — 88304 TISSUE EXAM BY PATHOLOGIST: CPT

## 2021-07-06 PROCEDURE — 88307 TISSUE EXAM BY PATHOLOGIST: CPT

## 2021-07-06 PROCEDURE — 7100000001 HC PACU RECOVERY - ADDTL 15 MIN: Performed by: SURGERY

## 2021-07-06 PROCEDURE — 0WBC4ZZ EXCISION OF MEDIASTINUM, PERCUTANEOUS ENDOSCOPIC APPROACH: ICD-10-PCS | Performed by: SURGERY

## 2021-07-06 PROCEDURE — C9113 INJ PANTOPRAZOLE SODIUM, VIA: HCPCS | Performed by: SURGERY

## 2021-07-06 PROCEDURE — 3600000009 HC SURGERY ROBOT BASE: Performed by: SURGERY

## 2021-07-06 PROCEDURE — 2709999900 HC NON-CHARGEABLE SUPPLY: Performed by: SURGERY

## 2021-07-06 PROCEDURE — 86901 BLOOD TYPING SEROLOGIC RH(D): CPT

## 2021-07-06 RX ORDER — HYDRALAZINE HYDROCHLORIDE 20 MG/ML
5 INJECTION INTRAMUSCULAR; INTRAVENOUS EVERY 10 MIN PRN
Status: DISCONTINUED | OUTPATIENT
Start: 2021-07-06 | End: 2021-07-06 | Stop reason: HOSPADM

## 2021-07-06 RX ORDER — MEPERIDINE HYDROCHLORIDE 25 MG/ML
12.5 INJECTION INTRAMUSCULAR; INTRAVENOUS; SUBCUTANEOUS EVERY 5 MIN PRN
Status: DISCONTINUED | OUTPATIENT
Start: 2021-07-06 | End: 2021-07-06 | Stop reason: HOSPADM

## 2021-07-06 RX ORDER — METOCLOPRAMIDE HYDROCHLORIDE 5 MG/ML
10 INJECTION INTRAMUSCULAR; INTRAVENOUS EVERY 6 HOURS PRN
Status: DISCONTINUED | OUTPATIENT
Start: 2021-07-06 | End: 2021-07-08 | Stop reason: HOSPADM

## 2021-07-06 RX ORDER — PROPOFOL 10 MG/ML
INJECTION, EMULSION INTRAVENOUS PRN
Status: DISCONTINUED | OUTPATIENT
Start: 2021-07-06 | End: 2021-07-06 | Stop reason: SDUPTHER

## 2021-07-06 RX ORDER — OXYCODONE HYDROCHLORIDE AND ACETAMINOPHEN 5; 325 MG/1; MG/1
1 TABLET ORAL PRN
Status: DISCONTINUED | OUTPATIENT
Start: 2021-07-06 | End: 2021-07-06 | Stop reason: HOSPADM

## 2021-07-06 RX ORDER — ACETAMINOPHEN 160 MG/5ML
650 SOLUTION ORAL ONCE
Status: COMPLETED | OUTPATIENT
Start: 2021-07-06 | End: 2021-07-06

## 2021-07-06 RX ORDER — BUPIVACAINE HYDROCHLORIDE 5 MG/ML
INJECTION, SOLUTION EPIDURAL; INTRACAUDAL PRN
Status: DISCONTINUED | OUTPATIENT
Start: 2021-07-06 | End: 2021-07-06 | Stop reason: HOSPADM

## 2021-07-06 RX ORDER — SCOLOPAMINE TRANSDERMAL SYSTEM 1 MG/1
1 PATCH, EXTENDED RELEASE TRANSDERMAL
Status: DISCONTINUED | OUTPATIENT
Start: 2021-07-06 | End: 2021-07-06

## 2021-07-06 RX ORDER — PROCHLORPERAZINE EDISYLATE 5 MG/ML
10 INJECTION INTRAMUSCULAR; INTRAVENOUS EVERY 6 HOURS PRN
Status: DISCONTINUED | OUTPATIENT
Start: 2021-07-06 | End: 2021-07-08 | Stop reason: HOSPADM

## 2021-07-06 RX ORDER — NALOXONE HYDROCHLORIDE 0.4 MG/ML
0.4 INJECTION, SOLUTION INTRAMUSCULAR; INTRAVENOUS; SUBCUTANEOUS PRN
Status: DISCONTINUED | OUTPATIENT
Start: 2021-07-06 | End: 2021-07-07

## 2021-07-06 RX ORDER — APREPITANT 40 MG/1
80 CAPSULE ORAL ONCE
Status: COMPLETED | OUTPATIENT
Start: 2021-07-06 | End: 2021-07-06

## 2021-07-06 RX ORDER — ONDANSETRON 2 MG/ML
4 INJECTION INTRAMUSCULAR; INTRAVENOUS EVERY 6 HOURS PRN
Status: DISCONTINUED | OUTPATIENT
Start: 2021-07-06 | End: 2021-07-08 | Stop reason: HOSPADM

## 2021-07-06 RX ORDER — OXYCODONE HYDROCHLORIDE AND ACETAMINOPHEN 5; 325 MG/1; MG/1
1 TABLET ORAL EVERY 6 HOURS PRN
Qty: 28 TABLET | Refills: 0 | Status: SHIPPED | OUTPATIENT
Start: 2021-07-06 | End: 2021-07-13

## 2021-07-06 RX ORDER — PHENYLEPHRINE HYDROCHLORIDE 10 MG/ML
INJECTION INTRAVENOUS PRN
Status: DISCONTINUED | OUTPATIENT
Start: 2021-07-06 | End: 2021-07-06 | Stop reason: SDUPTHER

## 2021-07-06 RX ORDER — PREGABALIN 150 MG/1
150 CAPSULE ORAL ONCE
Status: COMPLETED | OUTPATIENT
Start: 2021-07-06 | End: 2021-07-06

## 2021-07-06 RX ORDER — FENTANYL CITRATE 50 UG/ML
50 INJECTION, SOLUTION INTRAMUSCULAR; INTRAVENOUS EVERY 5 MIN PRN
Status: DISCONTINUED | OUTPATIENT
Start: 2021-07-06 | End: 2021-07-06 | Stop reason: HOSPADM

## 2021-07-06 RX ORDER — SODIUM CHLORIDE 9 MG/ML
10 INJECTION INTRAVENOUS DAILY
Status: DISCONTINUED | OUTPATIENT
Start: 2021-07-06 | End: 2021-07-08 | Stop reason: HOSPADM

## 2021-07-06 RX ORDER — DIPHENHYDRAMINE HYDROCHLORIDE 50 MG/ML
12.5 INJECTION INTRAMUSCULAR; INTRAVENOUS
Status: COMPLETED | OUTPATIENT
Start: 2021-07-06 | End: 2021-07-06

## 2021-07-06 RX ORDER — SODIUM CHLORIDE, SODIUM LACTATE, POTASSIUM CHLORIDE, CALCIUM CHLORIDE 600; 310; 30; 20 MG/100ML; MG/100ML; MG/100ML; MG/100ML
INJECTION, SOLUTION INTRAVENOUS CONTINUOUS
Status: DISCONTINUED | OUTPATIENT
Start: 2021-07-06 | End: 2021-07-06

## 2021-07-06 RX ORDER — SCOLOPAMINE TRANSDERMAL SYSTEM 1 MG/1
1 PATCH, EXTENDED RELEASE TRANSDERMAL ONCE
Status: DISCONTINUED | OUTPATIENT
Start: 2021-07-06 | End: 2021-07-06

## 2021-07-06 RX ORDER — BUPROPION HYDROCHLORIDE 100 MG/1
100 TABLET ORAL 3 TIMES DAILY
Qty: 60 TABLET | Refills: 3 | Status: SHIPPED | OUTPATIENT
Start: 2021-07-06 | End: 2021-07-23

## 2021-07-06 RX ORDER — ROCURONIUM BROMIDE 10 MG/ML
INJECTION, SOLUTION INTRAVENOUS PRN
Status: DISCONTINUED | OUTPATIENT
Start: 2021-07-06 | End: 2021-07-06 | Stop reason: SDUPTHER

## 2021-07-06 RX ORDER — FENTANYL CITRATE 50 UG/ML
INJECTION, SOLUTION INTRAMUSCULAR; INTRAVENOUS PRN
Status: DISCONTINUED | OUTPATIENT
Start: 2021-07-06 | End: 2021-07-06 | Stop reason: SDUPTHER

## 2021-07-06 RX ORDER — SODIUM CHLORIDE 9 MG/ML
INJECTION, SOLUTION INTRAVENOUS CONTINUOUS
Status: DISCONTINUED | OUTPATIENT
Start: 2021-07-06 | End: 2021-07-08

## 2021-07-06 RX ORDER — TAMSULOSIN HYDROCHLORIDE 0.4 MG/1
0.4 CAPSULE ORAL DAILY
Status: DISCONTINUED | OUTPATIENT
Start: 2021-07-06 | End: 2021-07-08 | Stop reason: HOSPADM

## 2021-07-06 RX ORDER — SODIUM CHLORIDE 9 MG/ML
25 INJECTION, SOLUTION INTRAVENOUS PRN
Status: DISCONTINUED | OUTPATIENT
Start: 2021-07-06 | End: 2021-07-08 | Stop reason: HOSPADM

## 2021-07-06 RX ORDER — PANTOPRAZOLE SODIUM 40 MG/10ML
40 INJECTION, POWDER, LYOPHILIZED, FOR SOLUTION INTRAVENOUS DAILY
Status: DISCONTINUED | OUTPATIENT
Start: 2021-07-06 | End: 2021-07-08 | Stop reason: HOSPADM

## 2021-07-06 RX ORDER — SODIUM CHLORIDE 0.9 % (FLUSH) 0.9 %
5-40 SYRINGE (ML) INJECTION PRN
Status: DISCONTINUED | OUTPATIENT
Start: 2021-07-06 | End: 2021-07-08 | Stop reason: HOSPADM

## 2021-07-06 RX ORDER — LIDOCAINE HCL/PF 100 MG/5ML
SYRINGE (ML) INJECTION PRN
Status: DISCONTINUED | OUTPATIENT
Start: 2021-07-06 | End: 2021-07-06 | Stop reason: SDUPTHER

## 2021-07-06 RX ORDER — CIPROFLOXACIN 2 MG/ML
400 INJECTION, SOLUTION INTRAVENOUS EVERY 12 HOURS
Status: DISCONTINUED | OUTPATIENT
Start: 2021-07-06 | End: 2021-07-08 | Stop reason: HOSPADM

## 2021-07-06 RX ORDER — SUCCINYLCHOLINE/SOD CL,ISO/PF 200MG/10ML
SYRINGE (ML) INTRAVENOUS PRN
Status: DISCONTINUED | OUTPATIENT
Start: 2021-07-06 | End: 2021-07-06 | Stop reason: SDUPTHER

## 2021-07-06 RX ORDER — METHOCARBAMOL 750 MG/1
750 TABLET, FILM COATED ORAL 4 TIMES DAILY
Qty: 40 TABLET | Refills: 0 | Status: SHIPPED | OUTPATIENT
Start: 2021-07-06 | End: 2021-07-16

## 2021-07-06 RX ORDER — ONDANSETRON 2 MG/ML
4 INJECTION INTRAMUSCULAR; INTRAVENOUS
Status: COMPLETED | OUTPATIENT
Start: 2021-07-06 | End: 2021-07-06

## 2021-07-06 RX ORDER — LABETALOL HYDROCHLORIDE 5 MG/ML
5 INJECTION, SOLUTION INTRAVENOUS EVERY 10 MIN PRN
Status: DISCONTINUED | OUTPATIENT
Start: 2021-07-06 | End: 2021-07-06 | Stop reason: HOSPADM

## 2021-07-06 RX ORDER — HYDROMORPHONE HCL 110MG/55ML
PATIENT CONTROLLED ANALGESIA SYRINGE INTRAVENOUS PRN
Status: DISCONTINUED | OUTPATIENT
Start: 2021-07-06 | End: 2021-07-06 | Stop reason: SDUPTHER

## 2021-07-06 RX ORDER — SODIUM CHLORIDE, SODIUM LACTATE, POTASSIUM CHLORIDE, AND CALCIUM CHLORIDE .6; .31; .03; .02 G/100ML; G/100ML; G/100ML; G/100ML
IRRIGANT IRRIGATION PRN
Status: DISCONTINUED | OUTPATIENT
Start: 2021-07-06 | End: 2021-07-06 | Stop reason: HOSPADM

## 2021-07-06 RX ORDER — OMEPRAZOLE 20 MG/1
20 CAPSULE, DELAYED RELEASE ORAL DAILY
Qty: 30 CAPSULE | Refills: 3 | Status: SHIPPED | OUTPATIENT
Start: 2021-07-06 | End: 2021-07-23

## 2021-07-06 RX ORDER — HYOSCYAMINE SULFATE 0.12 MG/1
1 TABLET SUBLINGUAL EVERY 6 HOURS PRN
Qty: 30 EACH | Refills: 0 | Status: SHIPPED | OUTPATIENT
Start: 2021-07-06 | End: 2021-07-23

## 2021-07-06 RX ORDER — FENTANYL CITRATE 50 UG/ML
25 INJECTION, SOLUTION INTRAMUSCULAR; INTRAVENOUS EVERY 5 MIN PRN
Status: DISCONTINUED | OUTPATIENT
Start: 2021-07-06 | End: 2021-07-06 | Stop reason: HOSPADM

## 2021-07-06 RX ORDER — PROCHLORPERAZINE EDISYLATE 5 MG/ML
5 INJECTION INTRAMUSCULAR; INTRAVENOUS
Status: COMPLETED | OUTPATIENT
Start: 2021-07-06 | End: 2021-07-06

## 2021-07-06 RX ORDER — SODIUM CHLORIDE 0.9 % (FLUSH) 0.9 %
5-40 SYRINGE (ML) INJECTION EVERY 12 HOURS SCHEDULED
Status: DISCONTINUED | OUTPATIENT
Start: 2021-07-06 | End: 2021-07-08 | Stop reason: HOSPADM

## 2021-07-06 RX ORDER — MAGNESIUM HYDROXIDE 1200 MG/15ML
LIQUID ORAL PRN
Status: DISCONTINUED | OUTPATIENT
Start: 2021-07-06 | End: 2021-07-06 | Stop reason: HOSPADM

## 2021-07-06 RX ORDER — METHYLENE BLUE 10 MG/ML
INJECTION INTRAVENOUS PRN
Status: DISCONTINUED | OUTPATIENT
Start: 2021-07-06 | End: 2021-07-06 | Stop reason: HOSPADM

## 2021-07-06 RX ORDER — ONDANSETRON 4 MG/1
4 TABLET, FILM COATED ORAL EVERY 6 HOURS PRN
Qty: 30 TABLET | Refills: 0 | Status: SHIPPED | OUTPATIENT
Start: 2021-07-06 | End: 2021-07-23

## 2021-07-06 RX ORDER — MIDAZOLAM HYDROCHLORIDE 1 MG/ML
INJECTION INTRAMUSCULAR; INTRAVENOUS PRN
Status: DISCONTINUED | OUTPATIENT
Start: 2021-07-06 | End: 2021-07-06 | Stop reason: SDUPTHER

## 2021-07-06 RX ORDER — OXYCODONE HYDROCHLORIDE AND ACETAMINOPHEN 5; 325 MG/1; MG/1
2 TABLET ORAL PRN
Status: DISCONTINUED | OUTPATIENT
Start: 2021-07-06 | End: 2021-07-06 | Stop reason: HOSPADM

## 2021-07-06 RX ADMIN — ENOXAPARIN SODIUM 60 MG: 60 INJECTION SUBCUTANEOUS at 20:35

## 2021-07-06 RX ADMIN — SODIUM CHLORIDE: 9 INJECTION, SOLUTION INTRAVENOUS at 22:36

## 2021-07-06 RX ADMIN — FENTANYL CITRATE 25 MCG: 50 INJECTION INTRAMUSCULAR; INTRAVENOUS at 10:42

## 2021-07-06 RX ADMIN — FENTANYL CITRATE 25 MCG: 50 INJECTION INTRAMUSCULAR; INTRAVENOUS at 10:49

## 2021-07-06 RX ADMIN — PHENYLEPHRINE HYDROCHLORIDE 100 MCG: 10 INJECTION INTRAVENOUS at 09:05

## 2021-07-06 RX ADMIN — PROPOFOL 200 MG: 10 INJECTION, EMULSION INTRAVENOUS at 07:55

## 2021-07-06 RX ADMIN — SODIUM CHLORIDE, POTASSIUM CHLORIDE, SODIUM LACTATE AND CALCIUM CHLORIDE: 600; 310; 30; 20 INJECTION, SOLUTION INTRAVENOUS at 07:35

## 2021-07-06 RX ADMIN — HYOSCYAMINE SULFATE 125 MCG: 0.12 TABLET, ORALLY DISINTEGRATING ORAL at 20:36

## 2021-07-06 RX ADMIN — SODIUM CHLORIDE 10 MG: 9 INJECTION INTRAMUSCULAR; INTRAVENOUS; SUBCUTANEOUS at 10:32

## 2021-07-06 RX ADMIN — HYDROMORPHONE HYDROCHLORIDE 1 MG: 2 INJECTION, SOLUTION INTRAMUSCULAR; INTRAVENOUS; SUBCUTANEOUS at 09:59

## 2021-07-06 RX ADMIN — FENTANYL CITRATE 100 MCG: 50 INJECTION, SOLUTION INTRAMUSCULAR; INTRAVENOUS at 07:50

## 2021-07-06 RX ADMIN — PANTOPRAZOLE SODIUM 40 MG: 40 INJECTION, POWDER, FOR SOLUTION INTRAVENOUS at 11:43

## 2021-07-06 RX ADMIN — Medication 10 ML: at 20:39

## 2021-07-06 RX ADMIN — ROCURONIUM BROMIDE 50 MG: 10 INJECTION INTRAVENOUS at 08:38

## 2021-07-06 RX ADMIN — PREGABALIN 150 MG: 150 CAPSULE ORAL at 07:03

## 2021-07-06 RX ADMIN — Medication 100 MG: at 07:55

## 2021-07-06 RX ADMIN — DIPHENHYDRAMINE HYDROCHLORIDE 12.5 MG: 50 INJECTION, SOLUTION INTRAMUSCULAR; INTRAVENOUS at 10:11

## 2021-07-06 RX ADMIN — ROCURONIUM BROMIDE 50 MG: 10 INJECTION INTRAVENOUS at 08:09

## 2021-07-06 RX ADMIN — APREPITANT 80 MG: 40 CAPSULE ORAL at 07:03

## 2021-07-06 RX ADMIN — METHOCARBAMOL 1000 MG: 100 INJECTION, SOLUTION INTRAMUSCULAR; INTRAVENOUS at 22:49

## 2021-07-06 RX ADMIN — ACETAMINOPHEN 650 MG: 650 SOLUTION ORAL at 07:03

## 2021-07-06 RX ADMIN — FENTANYL CITRATE 50 MCG: 50 INJECTION INTRAMUSCULAR; INTRAVENOUS at 10:20

## 2021-07-06 RX ADMIN — HYDROMORPHONE HYDROCHLORIDE 1 MG: 2 INJECTION, SOLUTION INTRAMUSCULAR; INTRAVENOUS; SUBCUTANEOUS at 09:49

## 2021-07-06 RX ADMIN — SODIUM CHLORIDE, POTASSIUM CHLORIDE, SODIUM LACTATE AND CALCIUM CHLORIDE: 600; 310; 30; 20 INJECTION, SOLUTION INTRAVENOUS at 09:42

## 2021-07-06 RX ADMIN — FENTANYL CITRATE 50 MCG: 50 INJECTION INTRAMUSCULAR; INTRAVENOUS at 10:31

## 2021-07-06 RX ADMIN — PHENYLEPHRINE HYDROCHLORIDE 100 MCG: 10 INJECTION INTRAVENOUS at 08:37

## 2021-07-06 RX ADMIN — FENTANYL CITRATE 50 MCG: 50 INJECTION INTRAMUSCULAR; INTRAVENOUS at 10:14

## 2021-07-06 RX ADMIN — Medication 140 MG: at 07:55

## 2021-07-06 RX ADMIN — ONDANSETRON 4 MG: 2 INJECTION INTRAMUSCULAR; INTRAVENOUS at 10:11

## 2021-07-06 RX ADMIN — TAMSULOSIN HYDROCHLORIDE 0.4 MG: 0.4 CAPSULE ORAL at 15:12

## 2021-07-06 RX ADMIN — CEFAZOLIN 3000 MG: 10 INJECTION, POWDER, FOR SOLUTION INTRAVENOUS at 07:52

## 2021-07-06 RX ADMIN — SUGAMMADEX 200 MG: 100 INJECTION, SOLUTION INTRAVENOUS at 09:37

## 2021-07-06 RX ADMIN — METHOCARBAMOL 1000 MG: 100 INJECTION, SOLUTION INTRAMUSCULAR; INTRAVENOUS at 15:12

## 2021-07-06 RX ADMIN — SODIUM CHLORIDE 990 ML: 9 INJECTION, SOLUTION INTRAVENOUS at 10:35

## 2021-07-06 RX ADMIN — ENOXAPARIN SODIUM 40 MG: 40 INJECTION SUBCUTANEOUS at 07:03

## 2021-07-06 RX ADMIN — SODIUM CHLORIDE, POTASSIUM CHLORIDE, SODIUM LACTATE AND CALCIUM CHLORIDE: 600; 310; 30; 20 INJECTION, SOLUTION INTRAVENOUS at 07:15

## 2021-07-06 RX ADMIN — PROCHLORPERAZINE EDISYLATE 5 MG: 5 INJECTION INTRAMUSCULAR; INTRAVENOUS at 10:05

## 2021-07-06 RX ADMIN — MIDAZOLAM HYDROCHLORIDE 2 MG: 2 INJECTION, SOLUTION INTRAMUSCULAR; INTRAVENOUS at 07:46

## 2021-07-06 RX ADMIN — CIPROFLOXACIN 400 MG: 2 INJECTION, SOLUTION INTRAVENOUS at 15:12

## 2021-07-06 RX ADMIN — Medication 10 ML: at 11:43

## 2021-07-06 ASSESSMENT — PULMONARY FUNCTION TESTS
PIF_VALUE: 30
PIF_VALUE: 1
PIF_VALUE: 29
PIF_VALUE: 25
PIF_VALUE: 32
PIF_VALUE: 30
PIF_VALUE: 25
PIF_VALUE: 30
PIF_VALUE: 29
PIF_VALUE: 10
PIF_VALUE: 30
PIF_VALUE: 27
PIF_VALUE: 1
PIF_VALUE: 30
PIF_VALUE: 30
PIF_VALUE: 32
PIF_VALUE: 25
PIF_VALUE: 23
PIF_VALUE: 23
PIF_VALUE: 30
PIF_VALUE: 32
PIF_VALUE: 10
PIF_VALUE: 30
PIF_VALUE: 30
PIF_VALUE: 29
PIF_VALUE: 23
PIF_VALUE: 32
PIF_VALUE: 23
PIF_VALUE: 18
PIF_VALUE: 30
PIF_VALUE: 27
PIF_VALUE: 32
PIF_VALUE: 24
PIF_VALUE: 29
PIF_VALUE: 29
PIF_VALUE: 32
PIF_VALUE: 30
PIF_VALUE: 30
PIF_VALUE: 23
PIF_VALUE: 30
PIF_VALUE: 31
PIF_VALUE: 32
PIF_VALUE: 30
PIF_VALUE: 32
PIF_VALUE: 29
PIF_VALUE: 29
PIF_VALUE: 27
PIF_VALUE: 29
PIF_VALUE: 24
PIF_VALUE: 27
PIF_VALUE: 29
PIF_VALUE: 23
PIF_VALUE: 25
PIF_VALUE: 30
PIF_VALUE: 30
PIF_VALUE: 32
PIF_VALUE: 30
PIF_VALUE: 1
PIF_VALUE: 30
PIF_VALUE: 32
PIF_VALUE: 29
PIF_VALUE: 29
PIF_VALUE: 1
PIF_VALUE: 30
PIF_VALUE: 24
PIF_VALUE: 21
PIF_VALUE: 30
PIF_VALUE: 0
PIF_VALUE: 27
PIF_VALUE: 30
PIF_VALUE: 25
PIF_VALUE: 9
PIF_VALUE: 25
PIF_VALUE: 32
PIF_VALUE: 30
PIF_VALUE: 24
PIF_VALUE: 29
PIF_VALUE: 30
PIF_VALUE: 23
PIF_VALUE: 25
PIF_VALUE: 30
PIF_VALUE: 23
PIF_VALUE: 32
PIF_VALUE: 30
PIF_VALUE: 30
PIF_VALUE: 29
PIF_VALUE: 30
PIF_VALUE: 25
PIF_VALUE: 25
PIF_VALUE: 30
PIF_VALUE: 1
PIF_VALUE: 30
PIF_VALUE: 30
PIF_VALUE: 25
PIF_VALUE: 23
PIF_VALUE: 30
PIF_VALUE: 0
PIF_VALUE: 2
PIF_VALUE: 32
PIF_VALUE: 30
PIF_VALUE: 29
PIF_VALUE: 30
PIF_VALUE: 32
PIF_VALUE: 1
PIF_VALUE: 32
PIF_VALUE: 29
PIF_VALUE: 30
PIF_VALUE: 24
PIF_VALUE: 29
PIF_VALUE: 30
PIF_VALUE: 9
PIF_VALUE: 29
PIF_VALUE: 10
PIF_VALUE: 30
PIF_VALUE: 30
PIF_VALUE: 29
PIF_VALUE: 23
PIF_VALUE: 30
PIF_VALUE: 7
PIF_VALUE: 32
PIF_VALUE: 32
PIF_VALUE: 1
PIF_VALUE: 29
PIF_VALUE: 32
PIF_VALUE: 2

## 2021-07-06 ASSESSMENT — PAIN DESCRIPTION - DESCRIPTORS
DESCRIPTORS: SORE
DESCRIPTORS: ACHING;SORE

## 2021-07-06 ASSESSMENT — PAIN SCALES - GENERAL
PAINLEVEL_OUTOF10: 7
PAINLEVEL_OUTOF10: 0
PAINLEVEL_OUTOF10: 5
PAINLEVEL_OUTOF10: 6
PAINLEVEL_OUTOF10: 6
PAINLEVEL_OUTOF10: 7
PAINLEVEL_OUTOF10: 7
PAINLEVEL_OUTOF10: 5
PAINLEVEL_OUTOF10: 7
PAINLEVEL_OUTOF10: 7
PAINLEVEL_OUTOF10: 0
PAINLEVEL_OUTOF10: 7
PAINLEVEL_OUTOF10: 5
PAINLEVEL_OUTOF10: 5

## 2021-07-06 ASSESSMENT — PAIN DESCRIPTION - ORIENTATION
ORIENTATION: UPPER;MID
ORIENTATION: UPPER;MID
ORIENTATION: ANTERIOR
ORIENTATION: UPPER;MID

## 2021-07-06 ASSESSMENT — PAIN - FUNCTIONAL ASSESSMENT
PAIN_FUNCTIONAL_ASSESSMENT: PREVENTS OR INTERFERES SOME ACTIVE ACTIVITIES AND ADLS
PAIN_FUNCTIONAL_ASSESSMENT: 0-10
PAIN_FUNCTIONAL_ASSESSMENT: PREVENTS OR INTERFERES SOME ACTIVE ACTIVITIES AND ADLS
PAIN_FUNCTIONAL_ASSESSMENT: PREVENTS OR INTERFERES SOME ACTIVE ACTIVITIES AND ADLS

## 2021-07-06 ASSESSMENT — PAIN DESCRIPTION - LOCATION
LOCATION: ABDOMEN

## 2021-07-06 ASSESSMENT — PAIN DESCRIPTION - PAIN TYPE
TYPE: SURGICAL PAIN

## 2021-07-06 ASSESSMENT — PAIN DESCRIPTION - ONSET
ONSET: GRADUAL
ONSET: ON-GOING
ONSET: ON-GOING

## 2021-07-06 ASSESSMENT — PAIN DESCRIPTION - FREQUENCY
FREQUENCY: CONTINUOUS
FREQUENCY: INTERMITTENT
FREQUENCY: CONTINUOUS

## 2021-07-06 ASSESSMENT — PAIN DESCRIPTION - PROGRESSION
CLINICAL_PROGRESSION: GRADUALLY WORSENING
CLINICAL_PROGRESSION: GRADUALLY IMPROVING
CLINICAL_PROGRESSION: NOT CHANGED

## 2021-07-06 NOTE — H&P
Update History & Physical    The patient's History and Physical of June 17, 2021 was reviewed with the patient and I examined the patient. There was no change. The surgical site was confirmed by the patient and me. Plan: The risks, benefits, expected outcome, and alternative to the recommended procedure have been discussed with the patient. Patient understands and wants to proceed with the procedure.      Electronically signed by Julio Tamayo DO on 7/6/2021 at 7:11 AM

## 2021-07-06 NOTE — PROGRESS NOTES
PACU Transfer Note    Vitals:    07/06/21 1128   BP: (P) 135/84   Pulse: (P) 79   Resp: (P) 18   Temp: (P) 97.4 °F (36.3 °C)   SpO2:        In: 2116 [P.O.:10; I.V.:2106]  Out: 10     Pain assessment:  Pain treated in pacu 6/10 on transfer. Pt utilizing PCA appropriately. Ice pack and ABD binder in place  Pain Level: 7    Report given to Receiving unit PEDRO Pérez at bedside. Report included pt history, surgical procedure, intraop meds, pacu meds, head to toe assessment, all I's  & o's, surgical dressing inspection, PCA, VS and pain level. She verbalized understanding.    7/6/2021 11:31 AM

## 2021-07-06 NOTE — FLOWSHEET NOTE
07/06/21 0730   Encounter Summary   Services provided to: Patient;Significant other   Referral/Consult From: Rounding   Support System Significant other   Continue Visiting   (7/6,brigid)   Complexity of Encounter Moderate   Length of Encounter 15 minutes   Routine   Type Pre-procedure   Assessment Calm; Approachable; Hopeful   Intervention Active listening;Explored feelings, thoughts, concerns   Outcome Comfort;Expressed gratitude   Staff Juan Wood, [address]

## 2021-07-06 NOTE — ANESTHESIA POSTPROCEDURE EVALUATION
Department of Anesthesiology  Postprocedure Note    Patient: Marti Rogers  MRN: [id number]  YOB: 1980  Date of evaluation: 7/6/2021  Time:  12:46 PM     Procedure Summary     Date: 07/06/21 Room / Location: [address] [place] [address]    Anesthesia Start: 8684 Anesthesia Stop: 1003    Procedure: ROBOTIC ASSISTED LAPAROSCOPIC SLEEVE GASTRECTOMY, REMOVAL OF MEDIASTINAL LIPOMA (N/A ) Diagnosis:       Morbid obesity ([address]      (Morbid obesity ([address] [E66.01])    Surgeons: Macey Gray DO Responsible Provider: Serena Baltazar MD    Anesthesia Type: general ASA Status: 2          Anesthesia Type: general    Yolanda Phase I: Yolanda Score: 9    Yolanda Phase II:      Last vitals: Reviewed and per EMR flowsheets.        Anesthesia Post Evaluation    Patient location during evaluation: PACU  Patient participation: complete - patient participated  Level of consciousness: awake  Pain score: 2  Airway patency: patent  Nausea & Vomiting: no nausea and no vomiting  Complications: no  Cardiovascular status: hemodynamically stable  Respiratory status: acceptable  Hydration status: euvolemic

## 2021-07-06 NOTE — PROGRESS NOTES
Patient taken off End Tidal Cannula and placed on Home CPAP w/ Continuous Pulse [email or web address]. Patient comfortable with O2 sat of 95%. No supplemental oxygen is bled into CPAP.

## 2021-07-06 NOTE — PROGRESS NOTES
4 Eyes Admission Assessment     I agree as the admission nurse that 2 RN's have performed a thorough Head to Toe Skin Assessment on the patient. ALL assessment sites listed below have been assessed on admission. Areas assessed by both nurses:   [x]   Head, Face, and Ears   [x]   Shoulders, Back, and Chest  [x]   Arms, Elbows, and Hands   [x]   Coccyx, Sacrum, and Ischium  [x]   Legs, Feet, and Heels        Does the Patient have Skin Breakdown?   No         Landon Prevention initiated:  NA   Wound Care Orders initiated:  NA      WOC nurse consulted for Pressure Injury (Stage 3,4, Unstageable, DTI, NWPT, and Complex wounds) or Landon score 18 or lower:  NA      Nurse 1 eSignature: Electronically signed by Brooke Danielson RN on 7/6/21 at 11:35 AM EDT    **SHARE this note so that the co-signing nurse is able to place an eSignature**    Nurse 2 eSignature: Electronically signed by Charles Grove RN on 7/6/21 at 1:13 PM EDT

## 2021-07-06 NOTE — PROGRESS NOTES
Department of Bariatric Surgery    Post Op Note  Subjective:  Up in chair. Dozing off and on. Arouses easily. Having some pain. C/o not being able to empty bladder and was diagnosed with prostatitis. Has four more days of antibiotics at home  Objective:    Anesthesia type: General    Exam:   VITALS:  /84   Pulse 79   Temp 97.4 °F (36.3 °C) (Oral)   Resp 18   Ht 6' 2\" (1.88 m)   Wt (!) 403 lb 0.6 oz (182.8 kg)   SpO2 95%   BMI 51.75 kg/m²   24HR INTAKE/OUTPUT:    Intake/Output Summary (Last 24 hours) at 7/6/2021 1405  Last data filed at 7/6/2021 1141  Gross per 24 hour   Intake 2116.6 ml   Output 10 ml   Net 2106.6 ml     Post-op vital signs:  Stable     Exam:General appearance: Oriented, appears stated age  Lungs: clear to auscultation bilaterally  Heart: regular rate and rhythm, S1, S2 normal, no murmur, click, rub or gallop and S1, S2 normal  Abdomen: soft, obese, appropriately-tender; no guarding, non-peritoneal, abd binder in place  Trocar sites C/D/I  Assessment and Plan  Pt is a 47year old male s/p Robotic assisted Laparoscopic Gastrectomy, removal of mediastinal lipoma *POD #0    Pain management, PCA.   Add robaxin  FeNa:  Diet - NPO except ice , Fluids NS @ 250 ml/hour x 8 hours then 150 ml/hr  :  Urine output -watch to void  Ambulation: OOB to chair, must walk every 2 hours  Respiratory:  IS at bedside, encourage hourly IS and deep breathing,  Home cpap and cont pulse ox  Prophylaxis: AC boots, lovenox

## 2021-07-06 NOTE — ANESTHESIA PRE PROCEDURE
Department of Anesthesiology  Preprocedure Note       Name:  Pinky Lennon   Age:  47 y.o.  :  1980                                          MRN:  [id number]         Date:  2021      Surgeon: Madison Rangel): Cynda Carrier, DO    Procedure: Procedure(s):  ROBOTIC ASSISTED LAPAROSCOPIC SLEEVE GASTRECTOMY    Medications prior to admission:   Prior to Admission medications    Medication Sig Start Date End Date Taking?  Authorizing Provider   ciprofloxacin (CIPRO) 500 MG tablet Take 1 tablet by mouth 2 times daily for 14 days 21 Yes Dayanara Billy MD   buPROPion (WELLBUTRIN XL) 300 MG extended release tablet Take 1 tablet by mouth every morning 21  Yes Dayanara Billy MD   citalopram (CELEXA) 40 MG tablet TAKE 1 TABLET BY MOUTH EVERY DAY 21  Yes Dayanara Billy MD       Current medications:    Current Facility-Administered Medications   Medication Dose Route Frequency Provider Last Rate Last Admin    ceFAZolin (ANCEF) 3,000 mg in dextrose 5 % 100 mL IVPB  3,000 mg Intravenous Once Cynda Carrier, DO        lactated ringers infusion   Intravenous Continuous Cynda Carrier, DO        scopolamine (TRANSDERM-SCOP) transdermal patch 1 patch  1 patch Transdermal Once Cynda Carrier, DO   1 patch at 21 0702    lactated ringers infusion   Intravenous Continuous Odilia Kerns MD           Allergies:  No Known Allergies    Problem List:    Patient Active Problem List   Diagnosis Code    Morbid obesity with BMI of 50.0-59.9, adult ([facility] E66.01, Z68.43    Anxiety F41.9    LAUREN (obstructive sleep apnea) C59.56    Neutrophilic leukocytosis C19.8    Pain of left leg M79.605    Subcutaneous nodule of left lower extremity R22.42    Lipoma of abdominal wall D17.1    Prediabetes R73.03    Vitamin D deficiency E55.9       Past Medical History:        Diagnosis Date    Anxiety     Chronic GERD     Depression     Influenza A 2017    Kidney stones     Obesity     Rectal bleeding     intermittently    Ulcerative [facility] ([facility]     Unspecified sleep apnea     USES CPAP       Past Surgical History:        Procedure Laterality Date    ABDOMEN SURGERY Bilateral 1985    hernia repair    COLONOSCOPY  2009    tics, ulcerative colitis    COLONOSCOPY  3/2014    O'Vasyl-polyp x 2, diverticulosis    ENDOSCOPY, COLON, DIAGNOSTIC      UPPER GASTROINTESTINAL ENDOSCOPY N/A 9/14/2020    EGD BIOPSY performed by Lissette Perez DO at [facility] History:    Social History     Tobacco Use    Smoking status: Never Smoker    Smokeless tobacco: Never Used   Substance Use Topics    Alcohol use: Not Currently     Comment: rarely                                Counseling given: Not Answered      Vital Signs (Current):   Vitals:    07/01/21 1053 07/06/21 0600   BP:  124/69   Pulse:  82   Resp:  18   Temp:  97.8 °F (36.6 °C)   TempSrc:  Oral   SpO2:  95%   Weight: (!) 407 lb (184.6 kg) (!) 403 lb 0.6 oz (182.8 kg)   Height: 6' 2\" (1.88 m) 6' 2\" (1.88 m)                                              BP Readings from Last 3 Encounters:   07/06/21 124/69   06/17/21 138/72   04/27/21 (!) 147/93       NPO Status:                                                                                 BMI:   Wt Readings from Last 3 Encounters:   07/06/21 (!) 403 lb 0.6 oz (182.8 kg)   06/17/21 (!) 416 lb 3.2 oz (188.8 kg)   06/08/21 (!) 419 lb 9.6 oz (190.3 kg)     Body mass index is 51.75 kg/m².     CBC:   Lab Results   Component Value Date    WBC 14.1 07/02/2021    RBC 4.95 07/02/2021    RBC 5.08 10/26/2016    HGB 13.3 07/02/2021    HCT 40.4 07/02/2021    MCV 81.7 07/02/2021    RDW 15.5 07/02/2021     07/02/2021       CMP:   Lab Results   Component Value Date     07/01/2021    K 4.8 07/01/2021     07/01/2021    CO2 24 07/01/2021    BUN 14 07/01/2021    CREATININE 0.6 07/01/2021    GFRAA >60 07/01/2021    GFRAA >60 11/27/2012    AGRATIO 1.1 07/01/2021    LABGLOM >60 07/01/2021 GLUCOSE 85 07/01/2021    PROT 8.1 07/01/2021    CALCIUM 10.2 07/01/2021    BILITOT 0.3 07/01/2021    ALKPHOS 87 07/01/2021    AST 26 07/01/2021    ALT 42 07/01/2021       POC Tests: No results for input(s): POCGLU, POCNA, POCK, POCCL, POCBUN, POCHEMO, POCHCT in the last 72 hours. Coags:   Lab Results   Component Value Date    PROTIME 11.9 05/06/2014    INR 1.06 05/06/2014    APTT 31.9 05/06/2014       HCG (If Applicable): No results found for: PREGTESTUR, PREGSERUM, HCG, HCGQUANT     ABGs: No results found for: PHART, PO2ART, XAC3DEJ, BHL5FSE, BEART, L7BPMLLP     Type & Screen (If Applicable):  No results found for: LABABO, LABRH    Drug/Infectious Status (If Applicable):  No results found for: HIV, HEPCAB    COVID-19 Screening (If Applicable):   Lab Results   Component Value Date    COVID19 NOT DETECTED 09/08/2020           Anesthesia Evaluation    Airway: Mallampati: II  TM distance: >3 FB   Neck ROM: full  Mouth opening: > = 3 FB Dental:          Pulmonary:   (+) sleep apnea: on CPAP,      (-) asthma                           Cardiovascular:  Exercise tolerance: good (>4 METS),       (-) past MI and  angina                Neuro/Psych:      (-) seizures           GI/Hepatic/Renal:   (+) GERD:, PUD, renal disease: kidney stones, morbid obesity          Endo/Other:        (-) diabetes mellitus               Abdominal:             Vascular: Other Findings:           Anesthesia Plan      general     ASA 2       Induction: intravenous. MIPS: Postoperative opioids intended. Anesthetic plan and risks discussed with patient. Plan discussed with CRNA.                   Mark Goodrich MD   7/6/2021

## 2021-07-06 NOTE — PROGRESS NOTES
Pt arrived to unit around 1130. VSS and Pt A&Ox4. Surgical site clean, dry, intact with abdominal binder and ice pack in place. Pt on 3 L nasal cannula. SCDs remain on Pt. PCA pump infusing per orders - Pt drowsy. IS teaching reinforced and encouraged. Urinal placed in bathroom. Fall precautions placed and call light within reach.  Will continue to monitor

## 2021-07-06 NOTE — CARE COORDINATION
Cm following, pt from home with family support, plans to return home no needs. POD#0 gastric sleeve, no needs at DC anticipated.   Electronically signed by Melva Henriquez RN on 7/6/2021 at 1:01 PM  [phone]

## 2021-07-06 NOTE — PROGRESS NOTES
From OR to pacu bay 17 accompanied by Mirza Veras and monitors applied. Intraop meds discussed and pt arrives able to state needs and denies pain.

## 2021-07-06 NOTE — OP NOTE
[facility]) Physicians   [employer]              Procedure Note    Indications: The patient was evaluated by our multidisciplinary team and was found to be a good candidate for weight loss surgery. Body mass index is 51.75 kg/m². Pre-operative Diagnosis:   Patient Active Problem List   Diagnosis    Morbid obesity with BMI of 50.0-59.9, adult ([address]    Anxiety    LAUREN (obstructive sleep apnea)    Neutrophilic leukocytosis    Pain of left leg    Subcutaneous nodule of left lower extremity    Lipoma of abdominal wall    Prediabetes    Vitamin D deficiency         Post-operative Diagnosis:   Same      Procedure:    - Robotic Sleeve Gastrectomy. - Removal of mediastinal lipoma      Surgeon: Clara Deutsch DO    Anesthesia: General endotracheal anesthesia        Procedure Details   The patient was seen again in the Holding Room. The risks, benefits, complications, treatment options, and expected outcomes were discussed with the patient and/or family. The possibilities of reaction to medication, pulmonary aspiration, perforation of viscus, bleeding, recurrent infection, strictures, leaks, failure to lose weight, regaining weight,  the need for additional procedures, failure to diagnose a condition, and creating a complication requiring transfusion or operation were discussed. There was concurrence with the proposed plan and informed consent was obtained. The site of surgery was properly noted/marked. The patient was taken to Operating Room, identified as Wally Farfan and the procedure verified as Laparoscopic Sleeve Gastrectomy. A Time Out was held and the above information confirmed. The patient was placed in the supine position and general anesthesia was induced, along with placement of orogastric tube and SCD's. Lovenox SQ given pre-operatively. IV Antibiotics given pre-operatively. All pressure points were padded properly.      A left upper quadrant incision was made and a veres needle was inserted after confirming with saline drop test.  After adequate pneumoperitoneum was obtained, a 5 mm trocar was inserted under direct vision and there was no injury on initial trocar placement. Additional ports were placed in the standard positions under direct vision. The abdomen was initially explored and no abnormalities were identified. A liver retractor was inserted through a small incision in the upper midline, lifted the liver upward, and was then secured to the OR table. The pylorus was identified and measurement was taken approximately 4 cm from the pylorus along the greater curvature of the stomach. The vessel sealer was used to take down the attachments and short gastric vessels along the greater curve of the stomach. This was continued until all attachments were taken down and continued to the gastro-esophageal junction. A 36 [place]og[place] dilator was advanced along the lesser curvature and into the pylorus. A large lipomatous mass was found in the anterior mediastinum anterior to the esophagus. Interestingly there was no hiatal hernia found on inspection yet this lipoma was firmly pressing against the esophagus and decision was made to remove in entirety to prevent dysphagia or migration of sleeve. Meticulous dissection was done to remove this from the chest cavity and vessel sealer was used to tease away from the esophagus and seal where appropriate, keeping thermal energy well away from vital structures. A stapler was fired along the dilator to create an appropriate sized gastric sleeve pouch. 2 Black, 3 Green firings with seamguard followed by a single blue firing were used to create the sleeve. The staple line looked very healthy and no bleeding from staple line. The stomach was confirmed to be completely divided with uniform shape,  no twist in the sleeve and wide patent incisura.        The stomach distal to the staple line was clamped and methylene blue saline was injected under pressure confirming no obstruction and no leak. The abdomen was carefully inspected and there was no bleeding or any other abnormality. The stomach was brought out through the RUQ incision. Hemostasis was confirmed. The 12 port sites was closed using 0.0 Vicryl  suture at the level of the fascia. The trocar site skin wounds were closed using 4.0 Vicryl after copious Irrigation of the wounds. Local Anesthetic used at port sites then Dermabond applied. Instrument, sponge, and needle counts were correct at the conclusion of the case. Findings: Morbid Obesity, mediastinal lipoma    Estimated Blood Loss:  Minimal           Drains: none           Specimens: Stomach (Subtotal), mediastinal lipoma           Complications:  None; patient tolerated the procedure well. Disposition: PACU - hemodynamically stable. Condition: stable    Attending Attestation: I was present and scrubbed for the entire procedure.

## 2021-07-07 LAB
ANION GAP SERPL CALCULATED.3IONS-SCNC: 9 MMOL/L (ref 3–16)
BUN BLDV-MCNC: 6 MG/DL (ref 7–20)
CALCIUM SERPL-MCNC: 8.9 MG/DL (ref 8.3–10.6)
CHLORIDE BLD-SCNC: 103 MMOL/L (ref 99–110)
CO2: 26 MMOL/L (ref 21–32)
CREAT SERPL-MCNC: 0.6 MG/DL (ref 0.9–1.3)
GFR AFRICAN AMERICAN: >60
GFR NON-AFRICAN AMERICAN: >60
GLUCOSE BLD-MCNC: 102 MG/DL (ref 70–99)
HCT VFR BLD CALC: 37 % (ref 40.5–52.5)
HEMOGLOBIN: 12.1 G/DL (ref 13.5–17.5)
MCH RBC QN AUTO: 26.8 PG (ref 26–34)
MCHC RBC AUTO-ENTMCNC: 32.6 G/DL (ref 31–36)
MCV RBC AUTO: 82.4 FL (ref 80–100)
PDW BLD-RTO: 15.1 % (ref 12.4–15.4)
PLATELET # BLD: 418 K/UL (ref 135–450)
PMV BLD AUTO: 8.1 FL (ref 5–10.5)
POTASSIUM SERPL-SCNC: 3.7 MMOL/L (ref 3.5–5.1)
RBC # BLD: 4.5 M/UL (ref 4.2–5.9)
SODIUM BLD-SCNC: 138 MMOL/L (ref 136–145)
WBC # BLD: 20.3 K/UL (ref 4–11)

## 2021-07-07 PROCEDURE — 6360000002 HC RX W HCPCS: Performed by: NURSE PRACTITIONER

## 2021-07-07 PROCEDURE — 85027 COMPLETE CBC AUTOMATED: CPT

## 2021-07-07 PROCEDURE — 36415 COLL VENOUS BLD VENIPUNCTURE: CPT

## 2021-07-07 PROCEDURE — 6360000002 HC RX W HCPCS: Performed by: SURGERY

## 2021-07-07 PROCEDURE — 2580000003 HC RX 258: Performed by: NURSE PRACTITIONER

## 2021-07-07 PROCEDURE — 6370000000 HC RX 637 (ALT 250 FOR IP): Performed by: NURSE PRACTITIONER

## 2021-07-07 PROCEDURE — C9113 INJ PANTOPRAZOLE SODIUM, VIA: HCPCS | Performed by: SURGERY

## 2021-07-07 PROCEDURE — 1200000000 HC SEMI PRIVATE

## 2021-07-07 PROCEDURE — 2580000003 HC RX 258: Performed by: SURGERY

## 2021-07-07 PROCEDURE — 80048 BASIC METABOLIC PNL TOTAL CA: CPT

## 2021-07-07 PROCEDURE — 2500000003 HC RX 250 WO HCPCS: Performed by: SURGERY

## 2021-07-07 RX ORDER — OXYCODONE HCL 5 MG/5 ML
10 SOLUTION, ORAL ORAL
Status: DISCONTINUED | OUTPATIENT
Start: 2021-07-07 | End: 2021-07-08 | Stop reason: HOSPADM

## 2021-07-07 RX ORDER — TAMSULOSIN HYDROCHLORIDE 0.4 MG/1
0.4 CAPSULE ORAL DAILY
Qty: 30 CAPSULE | Refills: 3 | Status: SHIPPED | OUTPATIENT
Start: 2021-07-08 | End: 2021-07-23

## 2021-07-07 RX ORDER — OXYCODONE HCL 5 MG/5 ML
10 SOLUTION, ORAL ORAL
Status: DISCONTINUED | OUTPATIENT
Start: 2021-07-07 | End: 2021-07-07

## 2021-07-07 RX ORDER — OXYCODONE HCL 5 MG/5 ML
5 SOLUTION, ORAL ORAL
Status: DISCONTINUED | OUTPATIENT
Start: 2021-07-07 | End: 2021-07-07

## 2021-07-07 RX ORDER — KETOROLAC TROMETHAMINE 30 MG/ML
30 INJECTION, SOLUTION INTRAMUSCULAR; INTRAVENOUS ONCE
Status: COMPLETED | OUTPATIENT
Start: 2021-07-07 | End: 2021-07-07

## 2021-07-07 RX ORDER — OXYCODONE HCL 5 MG/5 ML
5 SOLUTION, ORAL ORAL
Status: DISCONTINUED | OUTPATIENT
Start: 2021-07-07 | End: 2021-07-08 | Stop reason: HOSPADM

## 2021-07-07 RX ADMIN — ONDANSETRON 4 MG: 2 INJECTION INTRAMUSCULAR; INTRAVENOUS at 10:13

## 2021-07-07 RX ADMIN — OXYCODONE HYDROCHLORIDE 10 MG: 5 SOLUTION ORAL at 10:13

## 2021-07-07 RX ADMIN — HYOSCYAMINE SULFATE 125 MCG: 0.12 TABLET, ORALLY DISINTEGRATING ORAL at 06:42

## 2021-07-07 RX ADMIN — METHOCARBAMOL 1000 MG: 100 INJECTION, SOLUTION INTRAMUSCULAR; INTRAVENOUS at 13:33

## 2021-07-07 RX ADMIN — CIPROFLOXACIN 400 MG: 2 INJECTION, SOLUTION INTRAVENOUS at 03:45

## 2021-07-07 RX ADMIN — SODIUM CHLORIDE: 9 INJECTION INTRAMUSCULAR; INTRAVENOUS; SUBCUTANEOUS at 03:42

## 2021-07-07 RX ADMIN — ENOXAPARIN SODIUM 60 MG: 60 INJECTION SUBCUTANEOUS at 20:30

## 2021-07-07 RX ADMIN — Medication 10 ML: at 08:31

## 2021-07-07 RX ADMIN — PANTOPRAZOLE SODIUM 40 MG: 40 INJECTION, POWDER, FOR SOLUTION INTRAVENOUS at 08:30

## 2021-07-07 RX ADMIN — OXYCODONE HYDROCHLORIDE 10 MG: 5 SOLUTION ORAL at 23:14

## 2021-07-07 RX ADMIN — OXYCODONE HYDROCHLORIDE 10 MG: 5 SOLUTION ORAL at 19:57

## 2021-07-07 RX ADMIN — ENOXAPARIN SODIUM 60 MG: 60 INJECTION SUBCUTANEOUS at 08:30

## 2021-07-07 RX ADMIN — CIPROFLOXACIN 400 MG: 2 INJECTION, SOLUTION INTRAVENOUS at 14:24

## 2021-07-07 RX ADMIN — TAMSULOSIN HYDROCHLORIDE 0.4 MG: 0.4 CAPSULE ORAL at 08:30

## 2021-07-07 RX ADMIN — HYOSCYAMINE SULFATE 125 MCG: 0.12 TABLET, ORALLY DISINTEGRATING ORAL at 20:01

## 2021-07-07 RX ADMIN — SODIUM CHLORIDE: 9 INJECTION, SOLUTION INTRAVENOUS at 23:15

## 2021-07-07 RX ADMIN — SODIUM CHLORIDE 10 ML: 9 INJECTION, SOLUTION INTRAMUSCULAR; INTRAVENOUS; SUBCUTANEOUS at 08:31

## 2021-07-07 RX ADMIN — METHOCARBAMOL 1000 MG: 100 INJECTION, SOLUTION INTRAMUSCULAR; INTRAVENOUS at 06:43

## 2021-07-07 RX ADMIN — KETOROLAC TROMETHAMINE 30 MG: 30 INJECTION, SOLUTION INTRAMUSCULAR; INTRAVENOUS at 14:24

## 2021-07-07 RX ADMIN — HYOSCYAMINE SULFATE 125 MCG: 0.12 TABLET, ORALLY DISINTEGRATING ORAL at 13:34

## 2021-07-07 ASSESSMENT — PAIN SCALES - GENERAL
PAINLEVEL_OUTOF10: 8
PAINLEVEL_OUTOF10: 6
PAINLEVEL_OUTOF10: 7
PAINLEVEL_OUTOF10: 6
PAINLEVEL_OUTOF10: 7

## 2021-07-07 NOTE — PROGRESS NOTES
Pt alert and oriented. VSS. Surgical site clean, dry, intact with abdominal binder and ice pack in place. Pt ambulating in halls. SCDs remain when in bed. PCA pump infusing per orders  IS use encouraged. Pt voiding adequately. IVF infusing. Fall precautions placed and call light within reach.  Will continue to monitor

## 2021-07-07 NOTE — CARE COORDINATION
CM following, pt form home with family support, pt having post-op pain and poor PO intake at this time. Once able to tolerate PO Rayray-Clear diet and pain controlled will DC home no needs.   Electronically signed by Nat Frey RN on 7/7/2021 at 4:38 PM  [phone]

## 2021-07-07 NOTE — PROGRESS NOTES
Patient alert and oriented x4. VSS. Surgical sites cdi with no s/s of infection. abd binder in place. Patient has complained of pain throughout shift and and also states intolerance to po intake. Prn pain medication and scheduled robaxin given with benefit. Urinating with no issues. Hypoactive bowel sounds present. Patient denies any other needs at this time. Bed is in the lowest position, call light and bedside table within reach. Patient bed alarm is on. Will continue to monitor for changes in patient status.      Electronically signed by Charly Greene RN on 7/7/2021 at 5:47 PM

## 2021-07-08 VITALS
HEIGHT: 74 IN | OXYGEN SATURATION: 95 % | SYSTOLIC BLOOD PRESSURE: 119 MMHG | RESPIRATION RATE: 18 BRPM | HEART RATE: 79 BPM | BODY MASS INDEX: 40.43 KG/M2 | DIASTOLIC BLOOD PRESSURE: 79 MMHG | WEIGHT: 315 LBS | TEMPERATURE: 98.3 F

## 2021-07-08 PROCEDURE — 6370000000 HC RX 637 (ALT 250 FOR IP): Performed by: NURSE PRACTITIONER

## 2021-07-08 PROCEDURE — 6360000002 HC RX W HCPCS: Performed by: NURSE PRACTITIONER

## 2021-07-08 PROCEDURE — 2580000003 HC RX 258: Performed by: NURSE PRACTITIONER

## 2021-07-08 PROCEDURE — C9113 INJ PANTOPRAZOLE SODIUM, VIA: HCPCS | Performed by: SURGERY

## 2021-07-08 PROCEDURE — 2580000003 HC RX 258: Performed by: SURGERY

## 2021-07-08 PROCEDURE — 6360000002 HC RX W HCPCS: Performed by: SURGERY

## 2021-07-08 RX ORDER — SODIUM CHLORIDE 9 MG/ML
INJECTION, SOLUTION INTRAVENOUS CONTINUOUS
Status: DISCONTINUED | OUTPATIENT
Start: 2021-07-08 | End: 2021-07-08 | Stop reason: HOSPADM

## 2021-07-08 RX ORDER — BUPROPION HYDROCHLORIDE 100 MG/1
100 TABLET ORAL 3 TIMES DAILY
Status: DISCONTINUED | OUTPATIENT
Start: 2021-07-08 | End: 2021-07-08 | Stop reason: HOSPADM

## 2021-07-08 RX ORDER — CITALOPRAM 40 MG/1
40 TABLET ORAL DAILY
Status: DISCONTINUED | OUTPATIENT
Start: 2021-07-08 | End: 2021-07-08 | Stop reason: HOSPADM

## 2021-07-08 RX ORDER — SIMETHICONE 80 MG
80 TABLET,CHEWABLE ORAL EVERY 6 HOURS PRN
Status: DISCONTINUED | OUTPATIENT
Start: 2021-07-08 | End: 2021-07-08 | Stop reason: HOSPADM

## 2021-07-08 RX ADMIN — SODIUM CHLORIDE: 9 INJECTION, SOLUTION INTRAVENOUS at 07:40

## 2021-07-08 RX ADMIN — OXYCODONE HYDROCHLORIDE 10 MG: 5 SOLUTION ORAL at 06:13

## 2021-07-08 RX ADMIN — HYOSCYAMINE SULFATE 125 MCG: 0.12 TABLET, ORALLY DISINTEGRATING ORAL at 09:20

## 2021-07-08 RX ADMIN — ONDANSETRON 4 MG: 2 INJECTION INTRAMUSCULAR; INTRAVENOUS at 03:10

## 2021-07-08 RX ADMIN — PANTOPRAZOLE SODIUM 40 MG: 40 INJECTION, POWDER, FOR SOLUTION INTRAVENOUS at 07:59

## 2021-07-08 RX ADMIN — SIMETHICONE 80 MG: 80 TABLET, CHEWABLE ORAL at 07:57

## 2021-07-08 RX ADMIN — CITALOPRAM 40 MG: 40 TABLET, FILM COATED ORAL at 07:58

## 2021-07-08 RX ADMIN — TAMSULOSIN HYDROCHLORIDE 0.4 MG: 0.4 CAPSULE ORAL at 07:58

## 2021-07-08 RX ADMIN — ENOXAPARIN SODIUM 60 MG: 60 INJECTION SUBCUTANEOUS at 07:59

## 2021-07-08 RX ADMIN — BUPROPION HYDROCHLORIDE 100 MG: 100 TABLET, FILM COATED ORAL at 07:58

## 2021-07-08 RX ADMIN — SODIUM CHLORIDE: 9 INJECTION, SOLUTION INTRAVENOUS at 06:13

## 2021-07-08 RX ADMIN — CIPROFLOXACIN 400 MG: 2 INJECTION, SOLUTION INTRAVENOUS at 03:06

## 2021-07-08 RX ADMIN — HYOSCYAMINE SULFATE 125 MCG: 0.12 TABLET, ORALLY DISINTEGRATING ORAL at 03:06

## 2021-07-08 RX ADMIN — OXYCODONE HYDROCHLORIDE 10 MG: 5 SOLUTION ORAL at 09:49

## 2021-07-08 ASSESSMENT — PAIN SCALES - GENERAL
PAINLEVEL_OUTOF10: 7
PAINLEVEL_OUTOF10: 7

## 2021-07-08 NOTE — PLAN OF CARE
Problem: Pain:  Goal: Pain level will decrease  Description: Pain level will decrease  Outcome: Ongoing  Note: Patient alerts RN of increasing pain; RN administers pain medication as ordered      Problem: Falls - Risk of:  Goal: Will remain free from falls  Description: Will remain free from falls  Outcome: Ongoing  Note: Call light within reach; increased frequency of rounds [Follow-Up] : a follow-up visit

## 2021-07-08 NOTE — DISCHARGE SUMMARY
Patient ID:  Marjorie Alves  [id number]  50 y.o.  1980    Admit date: 7/6/2021    Discharge date and time: 7/8/21    Admitting Physician: Dasia Wilson DO     Discharge Physician: same    Admission Diagnoses: Morbid obesity ([facility] [E66.01]  Morbid obesity with BMI of 50.0-59.9, adult ([facility] [E66.01, Z68.43]    Discharge Diagnoses: same    Admission Condition: fair    Discharged Condition: stable    Indication for Admission: Surgery: Robotic assisted Laparoscopic Sleeve Gastrectomy, IV hydration, monitoring, pain and nausea management    Hospital Course: 47 y.o. male admitted with morbid obesity who underwent Robotic assisted laparoscopic sleeve gastrectomy. Surgery was uneventful and he was admitted to bariatric post-operative surgical floor in stable condition for IV hydration, monitoring and pain and nausea management. The first day after surgery he was struggling with adequate oral intake and adequate pain control. Adjustments were made and he was observed an additional night. The following morning the pain was tolerable on po pain medication and was taking adequate po. He was discharged in stable condition. Treatments: IV hydration        Disposition: home    Patient Instructions: Activity: activity as tolerated and no driving while on analgesics  Diet: clear liquids  Wound Care: as directed    Follow-up with Dr. Howie Bass in two weeks.         Signed:  CLARISSA Maria CNP  7/8/2021  12:35 PM

## 2021-07-08 NOTE — PROGRESS NOTES
Surgery Daily Progress Note  Marlin Bailey  CC: Morbid obesity  Subjective :  C/o gas pains. Feels like surgical pain has improved. Toradol helped. Minimal nausea. States he can only take small sips otherwise he has a lot of belching. Wears his cpap even when napping. Has ambulated. Urination has improved. No further c/o difficulty starting stream      Objective    Infusions:   sodium chloride      sodium chloride 250 mL/hr at 07/08/21 0613        I/O:I/O last 3 completed shifts:   In: 120 [P.O.:120]  Out: 2150 [Urine:2150]           Wt Readings from Last 1 Encounters:   07/06/21 (!) 403 lb 0.6 oz (182.8 kg)                 LABS:    Recent Labs     07/06/21  0738 07/07/21  0537   WBC 13.6* 20.3*   HGB 13.2* 12.1*   HCT 40.1* 37.0*   MCV 82.5 82.4   * 418        Recent Labs     07/07/21  0537      K 3.7      CO2 26   BUN 6*   CREATININE 0.6*          Exam:BP (!) 143/81   Pulse 75   Temp 98.4 °F (36.9 °C) (Oral)   Resp 18   Ht 6' 2\" (1.88 m)   Wt (!) 403 lb 0.6 oz (182.8 kg)   SpO2 96%   BMI 51.75 kg/m²   General appearance: alert, appears stated age and cooperative  Lungs: clear to auscultation bilaterally  Heart: regular rate and rhythm, S1, S2   Abdomen: soft, appropriately-tender; bowel sounds present      ASSESSMENT/PLAN: Pt. is a 47 y.o. male s/p robotic assisted laparoscopic gastrectomy, removal of mediastinal lipoma POD#2    Prostatitis-Cipro IV convert to oral and complete home prescription  Add mylicon for gas pains  Restart home meds  Continue bariatric clears  Encourage more walking  Home today  CLARISSA Jimenez CNP 7/8/2021 6:21 AM  [phone]

## 2021-07-08 NOTE — PLAN OF CARE
Problem: Pain:  Goal: Pain level will decrease  Description: Pain level will decrease  7/8/2021 0931 by Byron Wright RN  Outcome: Completed  7/8/2021 0906 by Byron Wright RN  Outcome: Ongoing  Note: Patient alerts RN of increasing pain; RN administers pain medication as ordered   Goal: Control of acute pain  Description: Control of acute pain  Outcome: Completed  Goal: Control of chronic pain  Description: Control of chronic pain  Outcome: Completed     Problem: Falls - Risk of:  Goal: Will remain free from falls  Description: Will remain free from falls  7/8/2021 0931 by Byron Wright RN  Outcome: Completed  7/8/2021 0906 by Byron Wright RN  Outcome: Ongoing  Note: Call light within reach; increased frequency of rounds   Goal: Absence of physical injury  Description: Absence of physical injury  Outcome: Completed

## 2021-07-08 NOTE — CARE COORDINATION
Case Management Assessment            Discharge Note                    Date / Time of Note: 7/8/2021 8:51 AM                  Discharge Note Completed by: Ashu Xiong RN    Patient Name: Benito Coy   YOB: 1980  Diagnosis: Morbid obesity ([facility] [address] [E66.01]  Morbid obesity with BMI of 50.0-59.9, adult ([facility] [address] [E66.01, Z68.43]   Date / Time: 7/6/2021  5:48 AM    Current PCP: Elsa Leos MD  Clinic patient: No    Hospitalization in the last 30 days: No    Advance Directives:  Code Status: Full Code  [place] DNR form completed and on chart: Not Indicated    Financial:  Payor: Rosanna Singletary / Plan: [address] [place] [address] / Product Type: *No Product type* /      Pharmacy:    [place] [address], [address] - P [phone] - F [phone]  [address] [address]  Phone: [phone] Fax: 1 [address] [phone] - f [phone]  [address] [place] [address]  Phone: [phone] Fax: [phone]    [address] [address]  Phone: [phone] Fax: [phone]      Assistance purchasing medications?: Potential Assistance Purchasing Medications: No  Assistance provided by Case Management: None at this time    Does patient want to participate in local refill/ meds to beds program?: Yes    Meds To Beds General Rules:  1. Can ONLY be done Monday- Friday between 8:30am-5pm  2. Prescription(s) must be in pharmacy by 3pm to be filled same day  3. Copy of patient's insurance/ prescription drug card and patient face sheet must be sent along with the prescription(s)  4. Cost of Rx cannot be added to hospital bill. If financial assistance is needed, please contact unit  or ;   or  CANNOT provide pharmacy voucher for patients co-pays  5. Patients can then  the prescription on their way out of the hospital at discharge, or pharmacy can deliver to the bedside if staff is available. (payment due at time of pick-up or delivery - cash, check, or card accepted)     Able to afford home medications/ co-pay costs: Yes    ADLS:  Current PT AM-PAC Score:   /24  Current OT AM-PAC Score:   /24      DISCHARGE Disposition: Home- No Services Needed    LOC at discharge: Not Applicable  TITO Completed: Not Indicated    Notification completed in HENS/PAS?:  Not Applicable    IMM Completed:   Not Indicated    Transportation:  Transportation PLAN for discharge: family   Mode of Transport: [facility]  ordered at discharge: Not [address]: Not Applicable  Orders faxed: No    Durable Medical Equipment:  DME Provider: none  Equipment obtained during hospitalization:     Home Oxygen and Respiratory Equipment:  Oxygen needed at discharge?: Not [address]: Not Applicable  Portable tank available for discharge?: Not Indicated    Dialysis:  Dialysis patient: No    Dialysis Center:  Not Applicable      Additional CM Notes: from home with family, no needs at DC. Will follow up out pt with surgical team    The Plan for Transition of Care is related to the following treatment goals of Morbid obesity ([facility] [address] [E66.01]  Morbid obesity with BMI of 50.0-59.9, adult ([facility] [address] [facility] [E66.01, Z68.43]    The Patient and/or patient representative Olamide Hernández and his family were provided with a choice of provider and agrees with the discharge plan Yes    Freedom of choice list was provided with basic dialogue that supports the patient's individualized plan of care/goals and shares the quality data associated with the providers.  Not Indicated    Care Transitions patient: Yes    Vlad Knapp RN  [facility]  Case Management Department  Ph: [phone]  Fax: [phone]

## 2021-07-09 ENCOUNTER — CARE COORDINATION (OUTPATIENT)
Dept: CASE MANAGEMENT | Age: 41
End: 2021-07-09

## 2021-07-09 NOTE — CARE COORDINATION
John 45 Transitions Initial Follow Up Call    Call within 2 business days of discharge: Yes    Patient:  Kandy Senior  Patient :  1980  MRN:  [id number]   Reason for Admission:  Gastrectomy   Discharge Date:  21  RARS: 8      CTC attempt to reach Pt regarding recent hospital discharge. CTC unable to leave voice recording with call back number requesting a call back / home no answer & other number voicemail full. Follow up appointments:    Future Appointments   Date Time Provider Ling Petty   [facility] 10[facility]   2021 10:45 AM Ken Tee MD [facility] [place] - DYD   2021  8:00 AM Nedra King MD [facility]       Pastora Das.  CHINYERE KearneyN, RN  Care Transition Coordinator  Contact Number:  [phone]

## 2021-07-12 ENCOUNTER — CARE COORDINATION (OUTPATIENT)
Dept: CASE MANAGEMENT | Age: 41
End: 2021-07-12

## 2021-07-12 NOTE — CARE COORDINATION
John 45 Transitions Initial Follow Up Call    Call within 2 business days of discharge:  Yes     Patient: Maya Cantu  Patient : 1980   MRN: [id number]   Reason for Admission: gastrectomy  Discharge Date: 21 RARS: Readmission Risk Score: 8      Last Discharge 2019 [place] [address]       Complaint Diagnosis Description Type Department Provider    21  Acute postoperative pain of abdomen . .. Admission (Discharged) [facility],            Spoke with: [address]: [facility]      Non-face-to-face services provided:  Obtained and reviewed discharge summary and/or continuity of care documents  Education of patient/family/caregiver/guardian to support self-management-\   Assessment and support for treatment adherence and medication management-      Care Transitions 24 Hour Call    Do you have any ongoing symptoms?: No  Do you have a copy of your discharge instructions?: Yes  Do you have all of your prescriptions and are they filled?: Yes  Have you been contacted by a [facility] Pharmacist?: No  Have you scheduled your follow up appointment?: Yes  Were you discharged with any Home Care or Post Acute Services: No  Do you feel like you have everything you need to keep you well at home?: Yes  Care Transitions Interventions       Challenges to be reviewed by the provider   Additional needs identified to be addressed with provider:   NO             Method of communication with provider :   phone      Was this a readmission? no    Care Transition Nurse (CTN) contacted the patient by telephone to perform post hospital discharge assessment. Verified name with patient as identifier. Provided introduction to self, and explanation of the CTN role. CTN reviewed discharge instructions, medical action plan and red flags with patient who verbalized understanding. Patient given an opportunity to ask questions and does not have any further questions or concerns at this time.      Were

## 2021-07-13 ENCOUNTER — TELEPHONE (OUTPATIENT)
Dept: BARIATRICS/WEIGHT MGMT | Age: 41
End: 2021-07-13

## 2021-07-13 NOTE — TELEPHONE ENCOUNTER
I attempted to contact the patient to follow up with them regarding their recent surgery. I have left a voicemail for the patient to return our call. If he calls back this morning, please forward call to me or send message to me. If he calls back after this morning, please place message to myself and the dieticians. Thanks!

## 2021-07-13 NOTE — TELEPHONE ENCOUNTER
Surgery Type: Sleeve      Surgery Date: 7/6/21    Surgeon: Dr. Daniel Betts    The patient was contacted to follow up on their recent bariatric surgery. The following topics were reviewed:    [x] Hydration is Adequate Reached at least 48 oz, mostly poweraide zero yesterday           --Patient is getting at least 48-64 oz of fluids a day, not including protein shakes. [x]Consuming Adequate Protein- 2 shakes yesterday made with 8oz skim milk         [x]Consuming 2 protein shakes a day                [x]Consuming 60-80 grams of protein a day    [x] Food intake is appropriate- refried beans  [x]Adequately pureeing foods, so that there are no chunks left. [x]Taking in 1-2 oz at a time  [x] Eating 4-6 times a day- 2 meals yesterday, but had been up to 4 meals a day  [x] Following the 30-30-30 rule  [x] Reminded patient to keep food diary to bring to their 2 week follow up appointment. [x] Pain relief techniques utilized- also taking Robaxin and Levsin  [x] Taking pain medication as prescribed  [] Utilizing Lidoderm patches (if prescribed)  []Taking Tylenol instead of prescription pain medication  [x] Wearing abdominal binder  [] Using ice for incisional pain- had been using heat, recommended switching to ice    [] Activity is appropriate  [] Walking 10 minutes out of every hour- does not think he is meeting this goal, advised on importance being up and moving. Reiterated goal.  [x]Avoiding heavy lifting (>10lbs)  [x] Utilizing their incentive spirometer    [] Issues with Nausea/Vomiting/Reflux  [] Using Zofran PRN for nausea- only has experienced nausea with pain   [x]Taking Prilosec for reflux    [] Issues with Constipation- 1 BM since surgery 3 days ago. Recommended he start with Colace, up to twice a day. []Tried Colace  []Tried Miralax    All questions and concerns addressed. He is having some mid abdominal discomfort when he goes a long time between eating.  The way his discomfort is described sounds like spasms, I encouraged him to utilize the Levsin as prescribed to help alleviate this pain. We discussed other signs/symptoms to monitor for and when to contact our office. Overall he is doing very well so far.

## 2021-07-15 ENCOUNTER — OFFICE VISIT (OUTPATIENT)
Dept: BARIATRICS/WEIGHT MGMT | Age: 41
End: 2021-07-15

## 2021-07-15 VITALS
BODY MASS INDEX: 40.43 KG/M2 | DIASTOLIC BLOOD PRESSURE: 89 MMHG | HEIGHT: 74 IN | SYSTOLIC BLOOD PRESSURE: 136 MMHG | HEART RATE: 89 BPM | WEIGHT: 315 LBS

## 2021-07-15 DIAGNOSIS — Z98.84 STATUS POST LAPAROSCOPIC SLEEVE GASTRECTOMY: Primary | ICD-10-CM

## 2021-07-15 PROCEDURE — 99024 POSTOP FOLLOW-UP VISIT: CPT | Performed by: SURGERY

## 2021-07-15 NOTE — PROGRESS NOTES
Dietary Assessment Note    Vitals:   Vitals:    07/15/21 1248   BP: 136/89   Pulse: 89   Weight: (!) 396 lb 9.6 oz (179.9 kg)   Height: 6' 2\" (1.88 m)    Patient lost 23 lbs over 5 weeks. Total Weight Loss: 51.8#    Labs reviewed:Results for Alexus Palmer (MRN [id number]) as of 7/15/2021 13:03   Ref. Range 7/7/2021 05:37   BUN Latest Ref Range: 7 - 20 mg/dL 6 (L)   Creatinine Latest Ref Range: 0.9 - 1.3 mg/dL 0.6 (L)   Anion Gap Latest Ref Range: 3 - 16  9   GFR Non- Latest Ref Range: >60  >60   GFR  Latest Ref Range: >60  >60   Glucose Latest Ref Range: 70 - 99 mg/dL 102 (H)   Calcium Latest Ref Range: 8.3 - 10.6 mg/dL 8.9   WBC Latest Ref Range: 4.0 - 11.0 K/uL 20.3 (H)   RBC Latest Ref Range: 4.20 - 5.90 M/uL 4.50   Hemoglobin Quant Latest Ref Range: 13.5 - 17.5 g/dL 12.1 (L)   Hematocrit Latest Ref Range: 40.5 - 52.5 % 37.0 (L)     Protein intake: >80 grams/day     Fluid intake: 48-64 oz/day    Multivitamin/mineral intake: Fusion - how many/day: 4    Calcium intake: None    Other: None    Exercise: Up and walking around the house. Nutrition Assessment: 1 week post-op visit. Protein shakes: 3 daily (mixed with skim milk)  Pureed foods: mashed potatoes made with chicken broth, refried beans, chili  Amount at a time: 2 oz  Frequency: about 6x daily  30-30-30:  Following    Food Intolerances/issues: none    Client Concerns: sweating    Goals: Start phase 3 at 3 weeks post op (7/27)    Plan: Follow up at 6 weeks post op      Ernst Gonzalez RD, LD

## 2021-07-15 NOTE — PROGRESS NOTES
[facility]) Physicians   General & Laparoscopic Surgery  [employer]       HPI:     Zach Melchor is a very pleasant 47 y.o. obese male , Body mass index is 50.92 kg/m². Randall Hilt And multiple medical problems who is presenting for bariatric follow up care. Zach Melchor is s/p laparoscopic sleeve gastrectomy by me   Comes today to the clinic without any complaints. Patient denies any nausea, vomiting, fevers, chills, shortness of breath, chest pain, constipation or urinary symptoms. Denies any heartburn nor dysphagia. Patient is feeling very well, and is very active. Patient is very pleased with the weight loss and resolution of co-morbid conditions. Past Medical History:   Diagnosis Date    Anxiety     Chronic GERD     Depression     Influenza A 03/27/2017    Kidney stones     Prostatitis     Rectal bleeding     intermittently    Ulcerative colitis ([facility] [address]     Unspecified sleep apnea     USES CPAP     Past Surgical History:   Procedure Laterality Date    ABDOMEN SURGERY Bilateral 1985    hernia repair    COLONOSCOPY  2009    tics, ulcerative colitis    COLONOSCOPY  3/2014    [place]-polyp x 2, diverticulosis    ENDOSCOPY, COLON, DIAGNOSTIC      SLEEVE GASTRECTOMY N/A 7/6/2021    ROBOTIC ASSISTED LAPAROSCOPIC SLEEVE GASTRECTOMY, REMOVAL OF MEDIASTINAL LIPOMA performed by Max Monk DO at [address] N/A 9/14/2020    EGD BIOPSY performed by Max Monk DO at [address] History   Problem Relation Age of Onset    Cancer Mother     Asthma Mother     High Blood Pressure Father     Obesity Father     COPD Sister      Social History     Tobacco Use    Smoking status: Never Smoker    Smokeless tobacco: Never Used   Substance Use Topics    Alcohol use: Not Currently     Comment: rarely     I counseled the patient on the importance of not smoking and risks of ETOH.    No Known Allergies  Vitals:    07/15/21 1248   BP: 136/89   Pulse: 89 Weight: (!) 396 lb 9.6 oz (179.9 kg)   Height: 6' 2\" (1.88 m)       Body mass index is 50.92 kg/m². Current Outpatient Medications:     Multiple Vitamin (MULTIVITAMIN, BARIATRIC FUSION COMPLETE, CHEW TAB), Take 4 tablets by mouth daily, Disp: , Rfl:     tamsulosin (FLOMAX) 0.4 MG capsule, Take 1 capsule by mouth daily, Disp: 30 capsule, Rfl: 3    Hyoscyamine Sulfate SL (LEVSIN/SL) 0.125 MG SUBL, Place 1 tablet under the tongue every 6 hours as needed (spasm), Disp: 30 each, Rfl: 0    omeprazole (PRILOSEC) 20 MG delayed release capsule, Take 1 capsule by mouth Daily, Disp: 30 capsule, Rfl: 3    ondansetron (ZOFRAN) 4 MG tablet, Take 1 tablet by mouth every 6 hours as needed for Nausea or Vomiting, Disp: 30 tablet, Rfl: 0    methocarbamol (ROBAXIN-750) 750 MG tablet, Take 1 tablet by mouth 4 times daily for 10 days, Disp: 40 tablet, Rfl: 0    buPROPion (WELLBUTRIN) 100 MG tablet, Take 1 tablet by mouth 3 times daily for 14 days Please use this prescription for the two weeks that pills need to be crushed then resume previous prescription, Disp: 60 tablet, Rfl: 3    citalopram (CELEXA) 40 MG tablet, TAKE 1 TABLET BY MOUTH EVERY DAY, Disp: 30 tablet, Rfl: 5      Review of Systems - History obtained from the patient  General ROS: negative  Psychological ROS: negative  Hematological and Lymphatic ROS: negative  Endocrine ROS: negative  Respiratory ROS: negative  Cardiovascular ROS: negative  Gastrointestinal ROS:negative  Genito-Urinary ROS: negative  Musculoskeletal ROS: negative   Skin ROS: negative    Physical Exam   Vitals Reviewed   Constitutional: Patient is oriented to person, place, and time. Patient appears well-developed and well-nourished. Patient is active and cooperative. Non-toxic appearance. No distress. Neck: Trachea normal and normal range of motion. No JVD present. Pulmonary/Chest: Effort normal. No accessory muscle usage or stridor. No apnea. No respiratory distress.    Cardiovascular: Normal rate and no JVD. Abdominal: Normal appearance. Patient exhibits no distension. Abdomen is soft, obese, non tender. Musculoskeletal: Normal range of motion. Patient exhibits no edema. Neurological: Patient is alert and oriented to person, place, and time. Patient has normal strength. GCS eye subscore is 4. GCS verbal subscore is 5. GCS motor subscore is 6. Skin: Skin is warm and dry. No abrasion and no rash noted. Patient is not diaphoretic. No cyanosis or erythema. Psychiatric: Patient has a normal mood and affect. Speech is normal and behavior is normal. Cognition and memory are normal.         A/P     Milena Edwards is 47 y.o. male , now with Body mass index is 50.92 kg/m². s/p Sleeve gastrectomy, has lost 23 lbs since last visit, total of 51 lbs weight loss. The patient underwent dietary counseling with registered dietician. I have reviewed, discussed and agree with the dietary plan. Patient is trying hard to keep good dietary and behavior modifications. Patient is monitoring portion sizes, food choices and liquid calories. Patient is trying to exercise regularly. Patient pleased with the surgery outcomes. We discussed how his weight affects his overall health including:  Florence Padilla was seen today for bariatrics post op follow up. Diagnoses and all orders for this visit:    Status post laparoscopic sleeve gastrectomy       and importance of weight loss to alleviate those co morbid conditions. I encouraged the patient to continue exercise and keeping healthy eating habits. Also counseled the patient extensively on post surgery care. RTC in 1 month  Diet and Exercise      Patient advised that its their responsibility to follow up for studies and/or labs ordered today.

## 2021-07-20 ENCOUNTER — CARE COORDINATION (OUTPATIENT)
Dept: CASE MANAGEMENT | Age: 41
End: 2021-07-20

## 2021-07-20 NOTE — CARE COORDINATION
John 45 Transitions Follow Up Call    2021    Patient: Tara Campoverde  Patient : 1980   MRN: [id number]   Reason for Admission:  Gastrectomy   Discharge Date: 21 RARS: Readmission Risk Score: 8         Spoke with: Xavier 7010 Transitions Subsequent and Final Call    Subsequent and Final Calls  Do you have any ongoing symptoms?: No  Have your medications changed?: No  Do you have any questions related to your medications?: No  Do you have any needs or concerns that I can assist you with?: No  Identified Barriers: None  Care Transitions Interventions  Other Interventions:         SUMMARY  CTN spoke to the Pt who reports pain to surgical site he rates 3/10. Pt confirms surgical wound is free of s/s of infection and states that bruising is improving. Pt denies issues with appetite, urination, and bowel habits. Pt confirms they have all meds and taking as prescribed. Pt seen by surgeon since d/c and denies any changes to his meds. From CDC: Are you at higher risk for severe illness?  Wash your hands often.  Avoid close contact (6 feet, which is about two arm lengths) with people who are sick.  Put distance between yourself and other people if COVID-19 is spreading in your community.  Clean and disinfect frequently touched surfaces.  Avoid all cruise travel and non-essential air travel.  Call your healthcare professional if you have concerns about COVID-19 and your underlying condition or if you are sick. Pt will take all meds as prescribed and schedule / keep doctors appt. CTC provided education on s/s that require medical attention and when to seek medical attention. CTC advised Pt of use urgent care or physicians 24 hr access line if assistance is needed after hours or on the weekend. Pt denies any needs or concerns and is agreeable with additional calls.     Follow Up  Future Appointments   Date Time Provider Ling Petty   2021 10:45 AM Óscar Neal MD [facility] [place] - DY[facility]   8/19/2021  8:00 AM Dennis Boeck, MD [facility]   8/19/2021 11:00 AM Macie Angela DO [address],

## 2021-07-27 ENCOUNTER — CARE COORDINATION (OUTPATIENT)
Dept: CASE MANAGEMENT | Age: 41
End: 2021-07-27

## 2021-07-27 NOTE — CARE COORDINATION
John 45 Transitions Follow Up Call    2021    Patient: Pinky Lennon  Patient : 1980   MRN: [id number]   Reason for Admission:  Gastrectomy   Discharge Date: 21 RARS: Readmission Risk Score: 8         Spoke with: Xavier Lacey10 Transitions Subsequent and Final Call    Subsequent and Final Calls  Do you have any ongoing symptoms?: No  Have your medications changed?: No  Do you have any questions related to your medications?: No  Do you currently have any active services?: No  Do you have any needs or concerns that I can assist you with?: No  Identified Barriers: None  Care Transitions Interventions  Other Interventions:         SUMMARY  CTN spoke to the Pt who reports he is going good. Pt denies pain to surgical site, appetite, urination, and LBM was Saturday. Pt confirms that surgical wound looks good and free of s/s of infection. From CDC: Are you at higher risk for severe illness?  Wash your hands often.  Avoid close contact (6 feet, which is about two arm lengths) with people who are sick.  Put distance between yourself and other people if COVID-19 is spreading in your community.  Clean and disinfect frequently touched surfaces.  Avoid all cruise travel and non-essential air travel.  Call your healthcare professional if you have concerns about COVID-19 and your underlying condition or if you are sick. Pt will take all meds as prescribed and schedule / keep doctors appt. CTC provided education on s/s that require medical attention and when to seek medical attention. CTC advised Pt of use urgent care or physicians 24 hr access line if assistance is needed after hours or on the weekend. Pt denies any needs or concerns and is agreeable with additional calls.     Follow Up  Future Appointments   Date Time Provider Ling Petty   2021  8:00 AM Dylan Day MD [place] [facility]   2021 11:00 AM Aura Johnson DO [address] RN

## 2021-07-29 ENCOUNTER — TELEPHONE (OUTPATIENT)
Dept: BARIATRICS/WEIGHT MGMT | Age: 41
End: 2021-07-29

## 2021-07-29 NOTE — TELEPHONE ENCOUNTER
Patient is s/p sleeve 7/6/21 calling about Fusion vitamins. Wanting an alternative that isn't chewable.   Please call [phone]

## 2021-08-03 ENCOUNTER — CARE COORDINATION (OUTPATIENT)
Dept: CASE MANAGEMENT | Age: 41
End: 2021-08-03

## 2021-08-03 NOTE — CARE COORDINATION
John 45 Transitions Initial Follow Up Call    8/3/21    Patient:  Shruthi Carias  Patient :  1980  MRN:  [id number]   Reason for Admission:  Gastrectomy   Discharge Date:  21  RARS: 8    CTC attempt to reach Pt regarding recent hospital discharge. CTC left voice recording with call back number requesting a call back. Follow up appointments:    Future Appointments   Date Time Provider [facility] [place]   2021  8:00 AM Janessa Saenz MD [facility]   2021 11:00 AM Chantel Mccord DO [facility]       SheCHINYERE JonesN, RN  Care Transition Coordinator  Contact Number:  [phone]

## 2021-08-19 ENCOUNTER — OFFICE VISIT (OUTPATIENT)
Dept: ENT CLINIC | Age: 41
End: 2021-08-19
Payer: COMMERCIAL

## 2021-08-19 ENCOUNTER — OFFICE VISIT (OUTPATIENT)
Dept: BARIATRICS/WEIGHT MGMT | Age: 41
End: 2021-08-19

## 2021-08-19 VITALS
SYSTOLIC BLOOD PRESSURE: 122 MMHG | BODY MASS INDEX: 40.43 KG/M2 | HEIGHT: 74 IN | WEIGHT: 315 LBS | DIASTOLIC BLOOD PRESSURE: 78 MMHG

## 2021-08-19 VITALS — BODY MASS INDEX: 40.43 KG/M2 | WEIGHT: 315 LBS | HEIGHT: 74 IN

## 2021-08-19 DIAGNOSIS — J31.0 CHRONIC RHINITIS: ICD-10-CM

## 2021-08-19 DIAGNOSIS — Z98.84 STATUS POST LAPAROSCOPIC SLEEVE GASTRECTOMY: Primary | ICD-10-CM

## 2021-08-19 DIAGNOSIS — H93.8X3 SENSATION OF FULLNESS IN BOTH EARS: ICD-10-CM

## 2021-08-19 DIAGNOSIS — J34.2 DEVIATED NASAL SEPTUM: ICD-10-CM

## 2021-08-19 DIAGNOSIS — J34.89 NASAL OBSTRUCTION: ICD-10-CM

## 2021-08-19 DIAGNOSIS — H93.13 TINNITUS OF BOTH EARS: Primary | ICD-10-CM

## 2021-08-19 DIAGNOSIS — J34.3 HYPERTROPHY OF BOTH INFERIOR NASAL TURBINATES: ICD-10-CM

## 2021-08-19 DIAGNOSIS — H61.23 BILATERAL IMPACTED CERUMEN: ICD-10-CM

## 2021-08-19 PROCEDURE — 99213 OFFICE O/P EST LOW 20 MIN: CPT | Performed by: STUDENT IN AN ORGANIZED HEALTH CARE EDUCATION/TRAINING PROGRAM

## 2021-08-19 PROCEDURE — 99024 POSTOP FOLLOW-UP VISIT: CPT | Performed by: SURGERY

## 2021-08-19 PROCEDURE — 69210 REMOVE IMPACTED EAR WAX UNI: CPT | Performed by: STUDENT IN AN ORGANIZED HEALTH CARE EDUCATION/TRAINING PROGRAM

## 2021-08-19 NOTE — PROGRESS NOTES
[place]      Patient Name: Lashonda Bradshaw Record Number:  [id number]  Primary Care Physician:  Lida Gilliland MD  Date of Consultation: 8/19/2021      Chief Complaint:   Chief Complaint   Patient presents with    Follow-up    Ear Problem        HISTORY OF PRESENT ILLNESS  Kenan Reeder is a(n) 47 y.o. male who presents today for evaluation of ear issues. Patient states that his ears feel full and he feels like they are clogged. He will have some decreased sense of hearing when he lays down. This is intermittent. He has had this problem before. He has had his ears cleaned in the past and this seems to help his symptoms. 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 He has tried nasal sprays in the past but these did not help. He does not use sinus irrigations or any other medications in his nose. He is not getting nasal bleeding. He will get intermittent sinus infections, usually several per year. I independently reviewed the patients past medical history, past surgical history, and social history. They are unremarkable except as noted in the HPI and below. The patient denies any family history related to the current complaint, and they deny any family history of bleeding disorders or difficulties with anesthesia unless noted below. Update 4/27/2021:    Patient presents today for follow-up. He has been using the nasal steroid sprays and irrigations. He feels like this helped him somewhat. However he is still having some clear nasal drainage and nasal obstruction. He also feels like his ears are still clogged up. He feels like his hearing is down. Update 8/19/2021:    The patient presents today for follow-up regarding his chronic sinonasal complaints. He is no longer using nasal steroid sprays in his nose. He is still getting intermittent congestion.   He feels like his ears are clogged.     Patient Active Problem List   Diagnosis    Morbid obesity with BMI of 50.0-59.9, adult ([address]    Anxiety    LAUREN (obstructive sleep apnea)    Neutrophilic leukocytosis    Pain of left leg    Subcutaneous nodule of left lower extremity    Lipoma of abdominal wall    Prediabetes    Vitamin D deficiency     Past Surgical History:   Procedure Laterality Date    ABDOMEN SURGERY Bilateral 1985    hernia repair    COLONOSCOPY  2009    tics, ulcerative colitis    COLONOSCOPY  03/2014    [place]-polyp x 2, diverticulosis    ENDOSCOPY, COLON, DIAGNOSTIC      SLEEVE GASTRECTOMY N/A 07/06/2021    [place] [facility] UPPER GASTROINTESTINAL ENDOSCOPY N/A 09/14/2020    EGD BIOPSY performed by Marian Lux DO at [facility] ENDOSCOPY     Family History   Problem Relation Age of Onset    Cancer Mother     Asthma Mother     High Blood Pressure Father     Obesity Father     COPD Sister      Social History     Tobacco Use    Smoking status: Never Smoker    Smokeless tobacco: Never Used   Vaping Use    Vaping Use: Never used   Substance Use Topics    Alcohol use: Not Currently     Comment: rarely    Drug use: No        Orders Only on 08/13/2021   Component Date Value Ref Range Status    Color, UA 08/13/2021 YELLOW  Straw/Yellow Final    Clarity, UA 08/13/2021 CLOUDY* Clear Final    Glucose, Ur 08/13/2021 Negative  Negative mg/dL Final    Bilirubin Urine 08/13/2021 Negative  Negative Final    Ketones, Urine 08/13/2021 Negative  Negative mg/dL Final    Specific [place], UA 08/13/2021 1.019  1.005 - 1.030 Final    Blood, Urine 08/13/2021 Negative  Negative Final    pH, UA 08/13/2021 7.0  5.0 - 8.0 Final    Protein, UA 08/13/2021 Negative  Negative mg/dL Final    Urobilinogen, Urine 08/13/2021 1.0  <2.0 E.U./dL Final    Nitrite, Urine 08/13/2021 Negative  Negative Final    Leukocyte Esterase, Urine 08/13/2021 Negative  Negative Final    Microscopic Examination 08/13/2021 YES   Final    Urine Type 08/13/2021 Cleancatch   Final    Comment: Urine received in a tube containing preservative. This preservative will not effect the physical  characteristics of the urine.  Urine Reflex to Culture 08/13/2021 Not Indicated   Final    Total Protein 08/13/2021 7.5  6.4 - 8.2 g/dL Final    Albumin 08/13/2021 4.0  3.4 - 5.0 g/dL Final    Alkaline Phosphatase 08/13/2021 99  40 - 129 U/L Final    ALT 08/13/2021 43* 10 - 40 U/L Final    AST 08/13/2021 27  15 - 37 U/L Final    Total Bilirubin 08/13/2021 0.4  0.0 - 1.0 mg/dL Final    Bilirubin, Direct 08/13/2021 <0.2  0.0 - 0.3 mg/dL Final    Bilirubin, Indirect 08/13/2021 see below  0.0 - 1.0 mg/dL Final    Comment: Indirect Bilirubin cannot be calculated since Total Bilirubin  and/or Direct Bilirubin is below measurable range.  Sodium 08/13/2021 139  136 - 145 mmol/L Final    Potassium 08/13/2021 4.4  3.5 - 5.1 mmol/L Final    Chloride 08/13/2021 102  99 - 110 mmol/L Final    CO2 08/13/2021 26  21 - 32 mmol/L Final    Anion Gap 08/13/2021 11  3 - 16 Final    Glucose 08/13/2021 86  70 - 99 mg/dL Final    BUN 08/13/2021 6* 7 - 20 mg/dL Final    CREATININE 08/13/2021 0.8* 0.9 - 1.3 mg/dL Final    GFR Non- 08/13/2021 >60  >60 Final    Comment: >60 mL/min/1.73m2 EGFR, calc. for ages 25 and older using the  MDRD formula (not corrected for weight), is valid for stable  renal function.  GFR  08/13/2021 >60  >60 Final    Comment: Chronic Kidney Disease: less than 60 ml/min/1.73 sq.m. Kidney Failure: less than 15 ml/min/1.73 sq.m. Results valid for patients 18 years and older.  Calcium 08/13/2021 9.8  8.3 - 10.6 mg/dL Final    Crystals, UA 08/13/2021 2+ Ca. Oxalate* None Seen /HPF Final    Urinalysis Comments 08/13/2021 see below   Final    Automated urinalysis results confirmed by microscopic.     Hyaline Casts, UA 08/13/2021 2  0 - 8 /LPF Final    WBC, UA 08/13/2021 2  0 - 5 /HPF Final    RBC, UA 08/13/2021 2  0 - 4 /HPF Final    Epithelial Cells, UA 08/13/2021 2  0 - 5 /HPF Final    Comment: Urinalysis microscopic performed using the  automated methodology (AUWI analyzer). DRUG/FOOD ALLERGIES: Patient has no known allergies. CURRENT MEDICATIONS  Prior to Admission medications    Medication Sig Start Date End Date Taking? Authorizing Provider   buPROPion (WELLBUTRIN XL) 300 MG extended release tablet Take 1 tablet by mouth every morning 7/23/21   Dominique Tariq MD   Multiple Vitamin (MULTIVITAMIN, BARIATRIC FUSION COMPLETE, CHEW TAB) Take 4 tablets by mouth daily    Historical Provider, MD   citalopram (CELEXA) 40 MG tablet TAKE 1 TABLET BY MOUTH EVERY DAY 4/30/21   Dominique Tariq MD       REVIEW OF SYSTEMS  The following systems were reviewed and revealed the following in addition to any already discussed in the HPI:    CONSTITUTIONAL: no weight loss, no fever, no night sweats, no chills  EYES: no vision changes, no blurry vision  EARS: no changes in hearing, no otalgia  NOSE: no epistaxis, no rhinorrhea  RESPIRATORY: no  Difficulty breathing, no shortness of breath  CV: no chest pain, no Peripheral vascular disease  HEME: No coagulation disorder, no Bleeding disorder  NEURO: no TIA or stroke-like symptoms  SKIN: No new rashes in the head and neck, no recent skin cancers  MOUTH: No new ulcers, no recent teeth infections  GASTROINTESTINAL: No diarrhea, stomach pain  PSYCH: No anxiety, no depression      PHYSICAL EXAM  /78 (Site: Right Lower Arm)   Ht 6' 2\" (1.88 m)   Wt (!) 368 lb (166.9 kg)   BMI 47.25 kg/m²     GENERAL: No acute distress, alert and oriented, no hoarseness, strong voice  EYES: EOMI, Anti-icteric  HENT:   Head: Normocephalic and atraumatic.    Face:  Symmetric, facial nerve intact, no sinus tenderness  Right Ear: Normal external ear, normal external auditory canal, intact tympanic membrane with normal mobility and aerated middle ear  Left Ear: Normal external ear, normal external auditory canal, intact tympanic membrane with normal mobility and aerated middle ear  Mouth/Oral Cavity:  normal lips, Uvula is midline, no mucosal lesions, no trismus, normal dentition, normal salivary quality/flow  Oropharynx/Larynx:  normal oropharynx, 3+ tonsils; patient did not tolerate mirror exam due to excessive gag reflex  Nose:Normal external nasal appearance. Anterior rhinoscopy shows rightward a deviated septum preventing view posteriorly. Hypertrophy turbinates. Normal mucosa   NECK: Normal range of motion, no thyromegaly, trachea is midline, no lymphadenopathy, no neck masses, no crepitus  CHEST: Normal respiratory effort, no retractions, breathing comfortably  SKIN: No rashes, normal appearing skin, no evidence of skin lesions/tumors  Neuro:  cranial nerve II-XII intact; normal gait  Cardio:  no edema    Impaction bilateral external auditory canals with cerumen preventing visualization of the TM    PROCEDURE    Use of Operating Microscope and Cerumen Removal CPT code 51097  Indications:  Bilateral cerumen impaction obstructing visualization of the tympanic membrane(s). An operating microscope was utilized to visualize the external auditory canals using a speculum. The external auditory canals were occluded with cerumen bilaterally. The cerumen and debris was removed with instrumentation including suction and currettes under microscopic evaluation. The bilateral tympanic membrane(s) and ossicles are intact. No fluid was visualized in the bilateral middle ears. Nasal Endoscopy (CPT code 15270) from initial visit    Preop: chronic rhinitis  Postop: Same    Verbal consent was received. After topical anesthesia and decongestion had been obtained using aerosolized 1% lidocaine and oxymetazoline, a 45 degree rigid endoscope was placed into both nares with the patient in a sitting position.  The following was observed:    Right Nasal Cavity and Paranasal Sinuses:  Polyp score = 0 (0 = no polyps, 1 = small polyps in middle meatus not reaching below the inferior border of the middle ramez, 2 = polyps reaching below the middle border of the middle turbinate, 3= large polyps reaching the lower border of the inferior turbinate or polyps medial to the middle ramez, 4= large polyps causing almost complete congestion/obstruction of the interior meatus)  Edema score = 2 (0 = absent, 1 = mild, 2 = severe)  Discharge score = 1 (0 = no discharge, 1 = clear thin discharge, 2 = thick purulent discharge)    Left Nasal Cavity and Paranasal Sinuses:    Polyp score = 0 (0 = no polyps, 1 = small polyps in middle meatus not reaching below the inferior border of the middle ramez, 2 = polyps reaching below the middle border of the middle turbinate, 3= large polyps reaching the lower border of the inferior turbinate or polyps medial to the middle ramez, 4= large polyps causing almost complete congestion/obstruction of the interior meatus)  Edema score = 2 (0 = absent, 1 = mild, 2 = severe)  Discharge score = 1 (0 = no discharge, 1 = clear thin discharge, 2 = thick purulent discharge)    Septum: intact and deviated to the right  Other:   -The inferior and middle turbinates were examined. The middle meatus, and sphenoethmoid recess was examined bilaterally.    -There is evidence of moderate sinonasal inflammation with inferior turbinate hypertrophy and septal deviation to the right.   -There were no complications. Tolerated well without complication. I attest that I was present for and did the entire procedure myself. ASSESSMENT/PLAN  1. Tinnitus of both ears      2. Nasal obstruction      3. Hypertrophy of both inferior nasal turbinates      4. Sensation of fullness in both ears      5. Chronic rhinitis      6. Deviated nasal septum      7. Bilateral impacted cerumen    This very pleasant 21-year-old gentleman here today for evaluation of multiple complaints related to his ears and nose.   On exam he had evidence of recurrent bilateral cerumen impaction which I was able to remove successfully. He states that his hearing is improved after the cerumen was removed, so I do not think that we need to obtain an audiogram at this time. At his last visit, I started him on azelastine spray and this did not help his nasal congestion. Right now he is not using anything in his nose and he still getting congested intermittently. He does have evidence of a septal deviation and inferior turbinate hypertrophy. He is still very symptomatic and having difficulty breathing through his nose. He does wear a CPAP at night. We did discuss that he would be a good candidate for a septoplasty and inferior turbinate reduction. He would like to think about this and let me know if he is interested in the future. As he is still recovering from bariatric surgery I encouraged him to call the time he needs to recover and consider it. Medical Decision Making: The following items were considered in medical decision making including or in addition to what was noted in the assessment and plan:   -Independent review of images  -Review / order clinical lab tests  -Review / order radiology tests  -Decision to obtain old records  -Review and summation of old records as accessed through [facility] and pertinent information was summarized in the note    This note was generated completely or in part utilizing Dragon dictation speech recognition software. Occasionally, words are mistranscribed and despite editing, the text may contain inaccuracies due to incorrect word recognition. If further clarification is needed please contact the office at [phone].

## 2021-08-19 NOTE — PROGRESS NOTES
[facility]) Physicians   General & Laparoscopic Surgery  [employer]       HPI:     Tara Campoverde is a very pleasant 47 y.o. obese male , Body mass index is 47.42 kg/m². [facility] And multiple medical problems who is presenting for bariatric follow up care. Tara Campoverde is s/p laparoscopic sleeve gastrectomy by me   Comes today to the clinic without any complaints. Patient denies any nausea, vomiting, fevers, chills, shortness of breath, chest pain, constipation or urinary symptoms. Denies any heartburn nor dysphagia. Patient is feeling very well, and is very active. Patient is very pleased with the weight loss and resolution of co-morbid conditions. Past Medical History:   Diagnosis Date    Anxiety     Chronic GERD     Depression     Influenza A 03/27/2017    Kidney stones     Prostatitis     Rectal bleeding     intermittently    Ulcerative colitis ([address]     Unspecified sleep apnea     USES CPAP     Past Surgical History:   Procedure Laterality Date    ABDOMEN SURGERY Bilateral 1985    hernia repair    COLONOSCOPY  2009    tics, ulcerative colitis    COLONOSCOPY  03/2014    [place]-polyp x 2, diverticulosis    ENDOSCOPY, COLON, DIAGNOSTIC      SLEEVE GASTRECTOMY N/A 07/06/2021    [facility] UPPER GASTROINTESTINAL ENDOSCOPY N/A 09/14/2020    EGD BIOPSY performed by Elizabet Diaz DO at [facility] ENDOSCOPY     Family History   Problem Relation Age of Onset    Cancer Mother     Asthma Mother     High Blood Pressure Father     Obesity Father     COPD Sister      Social History     Tobacco Use    Smoking status: Never Smoker    Smokeless tobacco: Never Used   Substance Use Topics    Alcohol use: Not Currently     Comment: rarely     I counseled the patient on the importance of not smoking and risks of ETOH. No Known Allergies  Vitals:    08/19/21 0846   Weight: (!) 369 lb 6 oz (167.5 kg)   Height: 6' 2\" (1.88 m)       Body mass index is 47.42 kg/m².       Current Outpatient Medications:     buPROPion (WELLBUTRIN XL) 300 MG extended release tablet, Take 1 tablet by mouth every morning, Disp: 30 tablet, Rfl: 1    Multiple Vitamin (MULTIVITAMIN, BARIATRIC FUSION COMPLETE, CHEW TAB), Take 4 tablets by mouth daily, Disp: , Rfl:     citalopram (CELEXA) 40 MG tablet, TAKE 1 TABLET BY MOUTH EVERY DAY, Disp: 30 tablet, Rfl: 5      Review of Systems - History obtained from the patient  General ROS: negative  Psychological ROS: negative  Hematological and Lymphatic ROS: negative  Endocrine ROS: negative  Respiratory ROS: negative  Cardiovascular ROS: negative  Gastrointestinal ROS:negative  Genito-Urinary ROS: negative  Musculoskeletal ROS: negative   Skin ROS: negative    Physical Exam   Vitals Reviewed   Constitutional: Patient is oriented to person, place, and time. Patient appears well-developed and well-nourished. Patient is active and cooperative. Non-toxic appearance. No distress. Neck: Trachea normal and normal range of motion. No JVD present. Pulmonary/Chest: Effort normal. No accessory muscle usage or stridor. No apnea. No respiratory distress. Cardiovascular: Normal rate and no JVD. Abdominal: Normal appearance. Patient exhibits no distension. Abdomen is soft, obese, non tender. Musculoskeletal: Normal range of motion. Patient exhibits no edema. Neurological: Patient is alert and oriented to person, place, and time. Patient has normal strength. GCS eye subscore is 4. GCS verbal subscore is 5. GCS motor subscore is 6. Skin: Skin is warm and dry. No abrasion and no rash noted. Patient is not diaphoretic. No cyanosis or erythema. Psychiatric: Patient has a normal mood and affect. Speech is normal and behavior is normal. Cognition and memory are normal.         A/P     Britni Gonzales is 47 y.o. male , now with Body mass index is 47.42 kg/m². s/p Sleeve gastrectomy, has lost 27 lbs since last visit, total of 79 lbs weight loss.  The patient underwent dietary counseling

## 2021-08-19 NOTE — PROGRESS NOTES
Dietary Assessment Note  Vitals:   Vitals:    08/19/21 0846   Weight: (!) 369 lb 6 oz (167.5 kg)   Height: 6' 2\" (1.88 m)   Patient lost 27 lbs over past ~5 weeks. Pt had a urine test recently w/ PCP, stated he was a little dehydrated. Total Weight Loss: 78.8 lbs    Labs reviewed: reviewed labs 8/13/21    Protein intake: 60-80 grams/day     Fluid intake: ~64-67oz    Multivitamin/mineral intake: yes - 4 fusion per day but make him gag    Calcium intake: no    Other: generally MVI w/ Fe    Exercise: no    Nutrition Assessment:  6 week post-op visit. B- 1.5 eggs w/ a little lunch meat  S- lunch meat & cheese  L- protein shake  D- lunch meat &/or mashed potatoes OR 4oz refried beans   S- protein shake    Amount able to eat per sitting: ~1/2 cup volume    Following 30/30/30 rule: yes    Food Intolerances/issues: none    Client Concerns: none    Goals:     Move to phase 4 guidelines    Plan: f/u as directed    Davina Seo, RD, LD

## 2021-10-01 ENCOUNTER — HOSPITAL ENCOUNTER (OUTPATIENT)
Dept: CT IMAGING | Age: 41
Discharge: HOME | End: 2021-10-01
Payer: COMMERCIAL

## 2021-10-01 DIAGNOSIS — Z87.442 PERSONAL HISTORY OF URINARY CALCULI: ICD-10-CM

## 2021-10-01 DIAGNOSIS — R31.29 MICROSCOPIC HEMATURIA: ICD-10-CM

## 2021-10-01 PROCEDURE — 6360000004 HC RX CONTRAST MEDICATION: Performed by: NURSE PRACTITIONER

## 2021-10-01 PROCEDURE — 74178 CT ABD&PLV WO CNTR FLWD CNTR: CPT

## 2021-10-01 RX ADMIN — IOPAMIDOL 75 ML: 755 INJECTION, SOLUTION INTRAVENOUS at 12:31

## 2021-10-21 ENCOUNTER — OFFICE VISIT (OUTPATIENT)
Dept: BARIATRICS/WEIGHT MGMT | Age: 41
End: 2021-10-21
Payer: COMMERCIAL

## 2021-10-21 VITALS
SYSTOLIC BLOOD PRESSURE: 131 MMHG | HEIGHT: 74 IN | BODY MASS INDEX: 40.43 KG/M2 | DIASTOLIC BLOOD PRESSURE: 75 MMHG | WEIGHT: 315 LBS

## 2021-10-21 DIAGNOSIS — E66.01 MORBID OBESITY WITH BMI OF 40.0-44.9, ADULT (HCC): Primary | ICD-10-CM

## 2021-10-21 DIAGNOSIS — Z98.84 STATUS POST LAPAROSCOPIC SLEEVE GASTRECTOMY: ICD-10-CM

## 2021-10-21 PROCEDURE — 99213 OFFICE O/P EST LOW 20 MIN: CPT | Performed by: SURGERY

## 2021-10-21 RX ORDER — TAMSULOSIN HYDROCHLORIDE 0.4 MG/1
CAPSULE ORAL
COMMUNITY
Start: 2021-09-24

## 2021-10-21 NOTE — PROGRESS NOTES
Dietary Assessment Note      Vitals:   Vitals:    10/21/21 0809   BP: 131/75   Weight: (!) 349 lb (158.3 kg)   Height: 6' 2\" (1.88 m)    Patient lost 20.6 lbs over past 2 months. Total Weight Loss: 99.4 lbs    Labs reviewed: No new nutrition related labs     Protein intake: 60-80 grams/day     Fluid intake: 90 oz per day     Multivitamin/mineral intake: MVI tablet 2 per day     Calcium intake: None     Other:  None     Exercise: Tries to walk 3 miles/day 5 x week     Nutrition Assessment: 14 week s/p sleeve post-op visit. Breakfast: magic cereal (low sugar with 13 grams of protein) with skim milk     Snack: several slices of ham/turkey with cheese     Lunch: steak with some broccoli     Snack: cheese stick sometimes with applesauce/low sugar yogurt/SF pudding    Dinner: LC wrap with imitation crab meat and cheese     Snack: cheese stick with some deli meat OR protein shake     Fluids: powerade zero, water with crystal light     Amount able to eat per sittin oz protein + 2 oz broccoli - eats ~ 6 oz     Following  rule: Yes     Food Intolerances/issues: Spicy foods     Client Concerns: constipation, hair loss     Goals:    Increase fiber in diet   Take citracal max plus 2 per day     Plan: F/U at 6 months     Cordelia Shahid RD, LD

## 2021-10-21 NOTE — PROGRESS NOTES
[facility]) Physicians   General & Laparoscopic Surgery  [employer]       HPI:     Nazia Hinton is a very pleasant 47 y.o. obese male , Body mass index is 44.81 kg/m². Paullette Rakes And multiple medical problems who is presenting for bariatric follow up care. Nzaia Hinton is s/p laparoscopic sleeve gastrectomy by me   Comes today to the clinic without any complaints. Patient denies any nausea, vomiting, fevers, chills, shortness of breath, chest pain, constipation or urinary symptoms. Denies any heartburn nor dysphagia. Patient is feeling very well, and is very active. Patient is very pleased with the weight loss and resolution of co-morbid conditions. Past Medical History:   Diagnosis Date    Anxiety     Chronic GERD     Depression     Influenza A 03/27/2017    Kidney stones     Prostatitis     Rectal bleeding     intermittently    Ulcerative colitis ([address]     Unspecified sleep apnea     USES CPAP     Past Surgical History:   Procedure Laterality Date    ABDOMEN SURGERY Bilateral 1985    hernia repair    COLONOSCOPY  2009    tics, ulcerative colitis    COLONOSCOPY  03/2014    O'[place]asyl-polyp x 2, diverticulosis    ENDOSCOPY, COLON, DIAGNOSTIC      SLEEVE GASTRECTOMY N/A 07/06/2021    Randy Rand UPPER GASTROINTESTINAL ENDOSCOPY N/A 09/14/2020    EGD BIOPSY performed by Ashley Tate DO at [address] ENDOSCOPY     Family History   Problem Relation Age of Onset    Cancer Mother     Asthma Mother     High Blood Pressure Father     Obesity Father     COPD Sister      Social History     Tobacco Use    Smoking status: Never Smoker    Smokeless tobacco: Never Used   Substance Use Topics    Alcohol use: Not Currently     Comment: rarely     I counseled the patient on the importance of not smoking and risks of ETOH. No Known Allergies  Vitals:    10/21/21 0809   BP: 131/75   Weight: (!) 349 lb (158.3 kg)   Height: 6' 2\" (1.88 m)       Body mass index is 44.81 kg/m².       Current Outpatient Medications:     tamsulosin (FLOMAX) 0.4 MG capsule, TAKE 1 CAPSULE BY MOUTH EVERY DAY, Disp: , Rfl:     buPROPion (WELLBUTRIN XL) 300 MG extended release tablet, Take 1 tablet by mouth every morning, Disp: 30 tablet, Rfl: 1    Multiple Vitamin (MULTIVITAMIN, BARIATRIC FUSION COMPLETE, CHEW TAB), Take 4 tablets by mouth daily, Disp: , Rfl:     citalopram (CELEXA) 40 MG tablet, TAKE 1 TABLET BY MOUTH EVERY DAY, Disp: 30 tablet, Rfl: 5      Review of Systems - History obtained from the patient  General ROS: negative  Psychological ROS: negative  Hematological and Lymphatic ROS: negative  Endocrine ROS: negative  Respiratory ROS: negative  Cardiovascular ROS: negative  Gastrointestinal ROS:negative  Genito-Urinary ROS: negative  Musculoskeletal ROS: negative   Skin ROS: negative    Physical Exam   Vitals Reviewed   Constitutional: Patient is oriented to person, place, and time. Patient appears well-developed and well-nourished. Patient is active and cooperative. Non-toxic appearance. No distress. Neck: Trachea normal and normal range of motion. No JVD present. Pulmonary/Chest: Effort normal. No accessory muscle usage or stridor. No apnea. No respiratory distress. Cardiovascular: Normal rate and no JVD. Abdominal: Normal appearance. Patient exhibits no distension. Abdomen is soft, obese, non tender. Musculoskeletal: Normal range of motion. Patient exhibits no edema. Neurological: Patient is alert and oriented to person, place, and time. Patient has normal strength. GCS eye subscore is 4. GCS verbal subscore is 5. GCS motor subscore is 6. Skin: Skin is warm and dry. No abrasion and no rash noted. Patient is not diaphoretic. No cyanosis or erythema. Psychiatric: Patient has a normal mood and affect. Speech is normal and behavior is normal. Cognition and memory are normal.         A/P     Johnnie Maki is 47 y.o. male , now with Body mass index is 44.81 kg/m².   s/p Sleeve gastrectomy, has lost 21 lbs since last visit, total of 99 lbs weight loss. The patient underwent dietary counseling with registered dietician. I have reviewed, discussed and agree with the dietary plan. Patient is trying hard to keep good dietary and behavior modifications. Patient is monitoring portion sizes, food choices and liquid calories. Patient is trying to exercise regularly. Patient pleased with the surgery outcomes. We discussed how his weight affects his overall health including:  Mayra Lopez was seen today for bariatrics post op follow up. Diagnoses and all orders for this visit:    Morbid obesity with BMI of 40.0-44.9, adult ([facility] [address]    Status post laparoscopic sleeve gastrectomy       and importance of weight loss to alleviate those co morbid conditions. I encouraged the patient to continue exercise and keeping healthy eating habits. Also counseled the patient extensively on post surgery care. Total encounter time:  20 minutes including any number of the following: review of labs, imaging, provider notes, outside hospital records; performing examination/evaluation; counseling patient and family; ordering medications/tests; placing referrals and communication with referring physicians; coordination of care, and documentation in the EHR. RTC in 3 months  Diet and Exercise      Patient advised that its their responsibility to follow up for studies and/or labs ordered today.

## 2021-12-03 RX ORDER — CITALOPRAM 40 MG/1
TABLET ORAL
Qty: 30 TABLET | Refills: 2 | Status: SHIPPED | OUTPATIENT
Start: 2021-12-03

## 2021-12-13 ENCOUNTER — TELEPHONE (OUTPATIENT)
Dept: PULMONOLOGY | Age: 41
End: 2021-12-13

## 2021-12-13 NOTE — TELEPHONE ENCOUNTER
Patient called to let us know the CPAP machine that is currently in use is now sending a message stating it has exceeded its lifetime      LOV: 04/2021      Please send order for new CPAP machine to [facility]

## 2022-01-20 ENCOUNTER — OFFICE VISIT (OUTPATIENT)
Dept: BARIATRICS/WEIGHT MGMT | Age: 42
End: 2022-01-20
Payer: COMMERCIAL

## 2022-01-20 VITALS — BODY MASS INDEX: 40.43 KG/M2 | WEIGHT: 315 LBS | HEIGHT: 74 IN

## 2022-01-20 DIAGNOSIS — Z98.84 STATUS POST LAPAROSCOPIC SLEEVE GASTRECTOMY: ICD-10-CM

## 2022-01-20 DIAGNOSIS — E66.01 MORBID OBESITY WITH BMI OF 40.0-44.9, ADULT (HCC): Primary | ICD-10-CM

## 2022-01-20 PROCEDURE — 99213 OFFICE O/P EST LOW 20 MIN: CPT | Performed by: SURGERY

## 2022-01-20 NOTE — PROGRESS NOTES
[facility]) Physicians   General & Laparoscopic Surgery  [employer]       HPI:     Luz Maria Bravo is a very pleasant 46 y.o. obese male , Body mass index is 42.63 kg/m². Thresa Abed And multiple medical problems who is presenting for bariatric follow up care. Luz Maria Bravo is s/p laparoscopic sleeve gastrectomy by me   Comes today to the clinic without any complaints. Patient denies any nausea, vomiting, fevers, chills, shortness of breath, chest pain, constipation or urinary symptoms. Denies any heartburn nor dysphagia. Patient is feeling very well, and is very active. Patient is very pleased with the weight loss and resolution of co-morbid conditions. Past Medical History:   Diagnosis Date    Anxiety     Chronic GERD     Depression     Influenza A 03/27/2017    Kidney stones     Prostatitis     Rectal bleeding     intermittently    Ulcerative colitis ([address]     Unspecified sleep apnea     USES CPAP     Past Surgical History:   Procedure Laterality Date    ABDOMEN SURGERY Bilateral 1985    hernia repair    COLONOSCOPY  2009    tics, ulcerative colitis    COLONOSCOPY  03/2014    [place]-polyp x 2, diverticulosis    ENDOSCOPY, COLON, DIAGNOSTIC      SLEEVE GASTRECTOMY N/A 07/06/2021    Xi Zhu UPPER GASTROINTESTINAL ENDOSCOPY N/A 09/14/2020    EGD BIOPSY performed by Alaina Benoit DO at [facility] ENDOSCOPY     Family History   Problem Relation Age of Onset    Cancer Mother     Asthma Mother     High Blood Pressure Father     Obesity Father     COPD Sister      Social History     Tobacco Use    Smoking status: Never Smoker    Smokeless tobacco: Never Used   Substance Use Topics    Alcohol use: Not Currently     Comment: rarely     I counseled the patient on the importance of not smoking and risks of ETOH. No Known Allergies  Vitals:    01/20/22 0922   Weight: (!) 332 lb (150.6 kg)   Height: 6' 2\" (1.88 m)       Body mass index is 42.63 kg/m².       Current Outpatient Medications:    citalopram (CELEXA) 40 MG tablet, TAKE 1 TABLET BY MOUTH EVERY DAY, Disp: 30 tablet, Rfl: 2    buPROPion (WELLBUTRIN XL) 300 MG extended release tablet, Take 1 tablet by mouth every morning, Disp: 90 tablet, Rfl: 3    tamsulosin (FLOMAX) 0.4 MG capsule, TAKE 1 CAPSULE BY MOUTH EVERY DAY, Disp: , Rfl:     Multiple Vitamin (MULTIVITAMIN, BARIATRIC FUSION COMPLETE, CHEW TAB), Take 4 tablets by mouth daily, Disp: , Rfl:       Review of Systems - History obtained from the patient  General ROS: negative  Psychological ROS: negative  Hematological and Lymphatic ROS: negative  Endocrine ROS: negative  Respiratory ROS: negative  Cardiovascular ROS: negative  Gastrointestinal ROS:negative  Genito-Urinary ROS: negative  Musculoskeletal ROS: negative   Skin ROS: negative    Physical Exam   Vitals Reviewed   Constitutional: Patient is oriented to person, place, and time. Patient appears well-developed and well-nourished. Patient is active and cooperative. Non-toxic appearance. No distress. Neck: Trachea normal and normal range of motion. No JVD present. Pulmonary/Chest: Effort normal. No accessory muscle usage or stridor. No apnea. No respiratory distress. Cardiovascular: Normal rate and no JVD. Abdominal: Normal appearance. Patient exhibits no distension. Abdomen is soft, obese, non tender. Musculoskeletal: Normal range of motion. Patient exhibits no edema. Neurological: Patient is alert and oriented to person, place, and time. Patient has normal strength. GCS eye subscore is 4. GCS verbal subscore is 5. GCS motor subscore is 6. Skin: Skin is warm and dry. No abrasion and no rash noted. Patient is not diaphoretic. No cyanosis or erythema. Psychiatric: Patient has a normal mood and affect. Speech is normal and behavior is normal. Cognition and memory are normal.         A/P     Elyssa Rodriguez is 46 y.o. male , now with Body mass index is 42.63 kg/m².   s/p Sleeve gastrectomy, has lost 17 lbs since last visit, total of 116 lbs weight loss. The patient underwent dietary counseling with registered dietician. I have reviewed, discussed and agree with the dietary plan. Patient is trying hard to keep good dietary and behavior modifications. Patient is monitoring portion sizes, food choices and liquid calories. Patient is trying to exercise regularly. Patient pleased with the surgery outcomes. We discussed how his weight affects his overall health including:  Lance Tomas was seen today for bariatrics post op follow up. Diagnoses and all orders for this visit:    Morbid obesity with BMI of 40.0-44.9, adult ([facility] [address]    Status post laparoscopic sleeve gastrectomy       and importance of weight loss to alleviate those co morbid conditions. I encouraged the patient to continue exercise and keeping healthy eating habits. Also counseled the patient extensively on post surgery care. Total encounter time:  20 minutes including any number of the following: review of labs, imaging, provider notes, outside hospital records; performing examination/evaluation; counseling patient and family; ordering medications/tests; placing referrals and communication with referring physicians; coordination of care, and documentation in the EHR. RTC in 3 months  Diet and Exercise      Patient advised that its their responsibility to follow up for studies and/or labs ordered today.

## 2022-01-20 NOTE — PROGRESS NOTES
Dietary Assessment Note      Vitals:   Vitals:    22 0922   Weight: (!) 332 lb (150.6 kg)   Height: 6' 2\" (1.88 m)    Patient lost 17 lbs over past 3 months. Total Weight Loss: 116.4 lbs    Labs reviewed: no new nutrition related labs     Protein intake: 60-80 grams/day     Fluid intake: 60 oz per day     Multivitamin/mineral intake: MVI tablet 2 per day    Calcium intake: nothing     Other: nothing     Exercise: Virtual reality 1 x day for 15 - 30 minutes    Nutrition Assessment: 6 mo s/p sleeve post-op visit.      Breakfast: Nectar shake made with skim milk (8 oz)     Snack: cereal (higher sugar)     Lunch: steak/chicken sometimes with veggies OR might have veggies later    Snack: veggies     Dinner: steak/chicken with veggies OR fast food - 6-8 wings     Snack: nothing     Fluids: powerade zero, water    Amount able to eat per sittin oz protein + 3 oz of side - eats ~ 6 oz     Following 3030/30 rule: Tries to follow and takes sips prn - sometimes stretches eating out to an hour    Food Intolerances/issues: spicy foods    Client Concerns: constipation, hair loss     Goals:   Avoid high/fat - sugary cereal / fried foods   Avoid grazing and focus on structured meals - stop eating after 30 minutes  Reduce portions to 4 oz protein and 1/2 cup veggie   Discussed adding more produce to meals/snacks to help with constipation  Take citracal max plus 2 x day - take separately and not at same time as MVI  Increase exercise to 20-30 minutes per day     Plan: Follow up at 8 mo post op and as needed    Tai Rivera, RD, LD

## 2022-03-03 ENCOUNTER — OFFICE VISIT (OUTPATIENT)
Dept: ENT CLINIC | Age: 42
End: 2022-03-03
Payer: COMMERCIAL

## 2022-03-03 VITALS
SYSTOLIC BLOOD PRESSURE: 125 MMHG | BODY MASS INDEX: 40.43 KG/M2 | DIASTOLIC BLOOD PRESSURE: 73 MMHG | HEART RATE: 60 BPM | HEIGHT: 74 IN | WEIGHT: 315 LBS

## 2022-03-03 DIAGNOSIS — J34.3 HYPERTROPHY OF BOTH INFERIOR NASAL TURBINATES: ICD-10-CM

## 2022-03-03 DIAGNOSIS — H61.23 BILATERAL IMPACTED CERUMEN: Primary | ICD-10-CM

## 2022-03-03 DIAGNOSIS — H93.8X3 SENSATION OF FULLNESS IN BOTH EARS: ICD-10-CM

## 2022-03-03 DIAGNOSIS — H93.13 TINNITUS OF BOTH EARS: ICD-10-CM

## 2022-03-03 DIAGNOSIS — J34.89 NASAL OBSTRUCTION: ICD-10-CM

## 2022-03-03 PROCEDURE — 99213 OFFICE O/P EST LOW 20 MIN: CPT | Performed by: STUDENT IN AN ORGANIZED HEALTH CARE EDUCATION/TRAINING PROGRAM

## 2022-03-03 PROCEDURE — 69210 REMOVE IMPACTED EAR WAX UNI: CPT | Performed by: STUDENT IN AN ORGANIZED HEALTH CARE EDUCATION/TRAINING PROGRAM

## 2022-03-03 NOTE — PROGRESS NOTES
[place]      Patient Name: Lashonda Bradshaw Record Number:  [id number]  Primary Care Physician:  Elieser Vernon MD  Date of Consultation: 3/3/2022      Chief Complaint:   Chief Complaint   Patient presents with    Other     Ears clogged        One Isiah Araiza is a(n) 46 y.o. male who presents today for evaluation of ear issues. Patient states that his ears feel full and he feels like they are clogged. He will have some decreased sense of hearing when he lays down. This is intermittent. He has had this problem before. He has had his ears cleaned in the past and this seems to help his symptoms. 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 He has tried nasal sprays in the past but these did not help. He does not use sinus irrigations or any other medications in his nose. He is not getting nasal bleeding. He will get intermittent sinus infections, usually several per year. I independently reviewed the patients past medical history, past surgical history, and social history. They are unremarkable except as noted in the HPI and below. The patient denies any family history related to the current complaint, and they deny any family history of bleeding disorders or difficulties with anesthesia unless noted below. Update 4/27/2021:    Patient presents today for follow-up. He has been using the nasal steroid sprays and irrigations. He feels like this helped him somewhat. However he is still having some clear nasal drainage and nasal obstruction. He also feels like his ears are still clogged up. He feels like his hearing is down. Update 8/19/2021:    The patient presents today for follow-up regarding his chronic sinonasal complaints. He is no longer using nasal steroid sprays in his nose. He is still getting intermittent congestion.   He feels like his ears are clogged. Update 3/3/2022:    Patient presents today for follow-up. He feels like his ears are clogged again. He notes that he had some decreased hearing and ringing in his ears. He also notes that he has had significant weighting loss since undergoing bariatric surgery.     Patient Active Problem List   Diagnosis    Morbid obesity with BMI of 50.0-59.9, adult ([address]    Anxiety    LAUREN (obstructive sleep apnea)    Neutrophilic leukocytosis    Pain of left leg    Subcutaneous nodule of left lower extremity    Lipoma of abdominal wall    Prediabetes    Vitamin D deficiency     Past Surgical History:   Procedure Laterality Date    ABDOMEN SURGERY Bilateral 1985    hernia repair    COLONOSCOPY  2009    tics, ulcerative colitis    COLONOSCOPY  03/2014    [place]-polyp x 2, diverticulosis    ENDOSCOPY, COLON, DIAGNOSTIC      SLEEVE GASTRECTOMY N/A 07/06/2021    [facility] [place] UPPER GASTROINTESTINAL ENDOSCOPY N/A 09/14/2020    EGD BIOPSY performed by Ajith Spencer DO at [facility] ENDOSCOPY     Family History   Problem Relation Age of Onset    Cancer Mother     Asthma Mother     High Blood Pressure Father     Obesity Father     COPD Sister      Social History     Tobacco Use    Smoking status: Never Smoker    Smokeless tobacco: Never Used   Vaping Use    Vaping Use: Never used   Substance Use Topics    Alcohol use: Not Currently     Comment: rarely    Drug use: No        Orders Only on 08/13/2021   Component Date Value Ref Range Status    Color, UA 08/13/2021 YELLOW  Straw/Yellow Final    Clarity, UA 08/13/2021 CLOUDY* Clear Final    Glucose, Ur 08/13/2021 Negative  Negative mg/dL Final    Bilirubin Urine 08/13/2021 Negative  Negative Final    Ketones, Urine 08/13/2021 Negative  Negative mg/dL Final    Specific [place], UA 08/13/2021 1.019  1.005 - 1.030 Final    Blood, Urine 08/13/2021 Negative  Negative Final    pH, UA 08/13/2021 7.0  5.0 - 8.0 Final    Protein, UA 08/13/2021 Negative  Negative mg/dL Final    Urobilinogen, Urine 08/13/2021 1.0  <2.0 E.U./dL Final    Nitrite, Urine 08/13/2021 Negative  Negative Final    Leukocyte Esterase, Urine 08/13/2021 Negative  Negative Final    Microscopic Examination 08/13/2021 YES   Final    Urine Type 08/13/2021 Cleancatch   Final    Comment: Urine received in a tube containing preservative. This preservative will not effect the physical  characteristics of the urine.  Urine Reflex to Culture 08/13/2021 Not Indicated   Final    Total Protein 08/13/2021 7.5  6.4 - 8.2 g/dL Final    Albumin 08/13/2021 4.0  3.4 - 5.0 g/dL Final    Alkaline Phosphatase 08/13/2021 99  40 - 129 U/L Final    ALT 08/13/2021 43* 10 - 40 U/L Final    AST 08/13/2021 27  15 - 37 U/L Final    Total Bilirubin 08/13/2021 0.4  0.0 - 1.0 mg/dL Final    Bilirubin, Direct 08/13/2021 <0.2  0.0 - 0.3 mg/dL Final    Bilirubin, Indirect 08/13/2021 see below  0.0 - 1.0 mg/dL Final    Comment: Indirect Bilirubin cannot be calculated since Total Bilirubin  and/or Direct Bilirubin is below measurable range.  Sodium 08/13/2021 139  136 - 145 mmol/L Final    Potassium 08/13/2021 4.4  3.5 - 5.1 mmol/L Final    Chloride 08/13/2021 102  99 - 110 mmol/L Final    CO2 08/13/2021 26  21 - 32 mmol/L Final    Anion Gap 08/13/2021 11  3 - 16 Final    Glucose 08/13/2021 86  70 - 99 mg/dL Final    BUN 08/13/2021 6* 7 - 20 mg/dL Final    CREATININE 08/13/2021 0.8* 0.9 - 1.3 mg/dL Final    GFR Non- 08/13/2021 >60  >60 Final    Comment: >60 mL/min/1.73m2 EGFR, calc. for ages 25 and older using the  MDRD formula (not corrected for weight), is valid for stable  renal function.  GFR  08/13/2021 >60  >60 Final    Comment: Chronic Kidney Disease: less than 60 ml/min/1.73 sq.m. Kidney Failure: less than 15 ml/min/1.73 sq.m. Results valid for patients 18 years and older.  Calcium 08/13/2021 9.8  8.3 - 10.6 mg/dL Final    Crystals, UA 08/13/2021 2+ Ca. Oxalate* None Seen /HPF Final    Urinalysis Comments 08/13/2021 see below   Final    Automated urinalysis results confirmed by microscopic.  Hyaline Casts, UA 08/13/2021 2  0 - 8 /LPF Final    WBC, UA 08/13/2021 2  0 - 5 /HPF Final    RBC, UA 08/13/2021 2  0 - 4 /HPF Final    Epithelial Cells, UA 08/13/2021 2  0 - 5 /HPF Final    Comment: Urinalysis microscopic performed using the  automated methodology (AUWI analyzer). DRUG/FOOD ALLERGIES: Patient has no known allergies. CURRENT MEDICATIONS  Prior to Admission medications    Medication Sig Start Date End Date Taking?  Authorizing Provider   citalopram (CELEXA) 40 MG tablet TAKE 1 TABLET BY MOUTH EVERY DAY 12/3/21  Yes Ian Cabrera MD   buPROPion (WELLBUTRIN XL) 300 MG extended release tablet Take 1 tablet by mouth every morning 11/8/21  Yes Ian Cabrera MD   tamsulosin (FLOMAX) 0.4 MG capsule TAKE 1 CAPSULE BY MOUTH EVERY DAY 9/24/21  Yes Historical Provider, MD   Multiple Vitamin (MULTIVITAMIN, BARIATRIC FUSION COMPLETE, CHEW TAB) Take 4 tablets by mouth daily   Yes Historical Provider, MD       REVIEW OF SYSTEMS  The following systems were reviewed and revealed the following in addition to any already discussed in the HPI:    CONSTITUTIONAL: no weight loss, no fever, no night sweats, no chills  EYES: no vision changes, no blurry vision  EARS: no changes in hearing, no otalgia  NOSE: no epistaxis, no rhinorrhea  RESPIRATORY: no  Difficulty breathing, no shortness of breath  CV: no chest pain, no Peripheral vascular disease  HEME: No coagulation disorder, no Bleeding disorder  NEURO: no TIA or stroke-like symptoms  SKIN: No new rashes in the head and neck, no recent skin cancers  MOUTH: No new ulcers, no recent teeth infections  GASTROINTESTINAL: No diarrhea, stomach pain  PSYCH: No anxiety, no depression      PHYSICAL EXAM  /73 (Site: Right Upper Arm, Position: Sitting, Cuff Size: Large Adult)   Pulse 60   Ht 6' 2\" (1.88 m)   Wt (!) 330 lb 12.8 oz (150 kg)   BMI 42.47 kg/m²     GENERAL: No acute distress, alert and oriented, no hoarseness, strong voice  EYES: EOMI, Anti-icteric  HENT:   Head: Normocephalic and atraumatic. Face:  Symmetric, facial nerve intact, no sinus tenderness  Right Ear: Normal external ear, normal external auditory canal, intact tympanic membrane with normal mobility and aerated middle ear  Left Ear: Normal external ear, normal external auditory canal, intact tympanic membrane with normal mobility and aerated middle ear  Mouth/Oral Cavity:  normal lips, Uvula is midline, no mucosal lesions, no trismus, normal dentition, normal salivary quality/flow  Oropharynx/Larynx:  normal oropharynx, 3+ tonsils; patient did not tolerate mirror exam due to excessive gag reflex  Nose:Normal external nasal appearance. Anterior rhinoscopy shows rightward a deviated septum preventing view posteriorly. Hypertrophy turbinates. Normal mucosa       Impaction bilateral external auditory canals with cerumen preventing visualization of the TM    PROCEDURE    Use of Operating Microscope and Cerumen Removal CPT code 28825  Indications:  Bilateral cerumen impaction obstructing visualization of the tympanic membrane(s). An operating microscope was utilized to visualize the external auditory canals using a speculum. The external auditory canals were occluded with cerumen bilaterally. The cerumen and debris was removed with instrumentation including suction and currettes under microscopic evaluation. The bilateral tympanic membrane(s) and ossicles are intact. No fluid was visualized in the bilateral middle ears. Nasal Endoscopy (CPT code 69103) from initial visit    Preop: chronic rhinitis  Postop: Same    Verbal consent was received.  After topical anesthesia and decongestion had been obtained using aerosolized 1% lidocaine and oxymetazoline, a 45 degree rigid endoscope was placed into both nares with the patient in a pleasant 27-year-old gentleman here today for evaluation of multiple complaints related to his ears and nose. On exam he had evidence of recurrent bilateral cerumen impaction which I was able to remove successfully. At his last visit, I started him on azelastine spray and this did not help his nasal congestion. Right now he is not using anything in his nose and he still getting congested intermittently. He does have evidence of a septal deviation and inferior turbinate hypertrophy. He is still very symptomatic and having difficulty breathing through his nose. He does wear a CPAP at night. We have previously discussed that he would be a good candidate for a septoplasty and inferior turbinate reduction. He would like to think about this. Medical Decision Making: The following items were considered in medical decision making including or in addition to what was noted in the assessment and plan:   -Independent review of images  -Review / order clinical lab tests  -Review / order radiology tests  -Decision to obtain old records  -Review and summation of old records as accessed through [facility] and pertinent information was summarized in the note    This note was generated completely or in part utilizing Dragon dictation speech recognition software. Occasionally, words are mistranscribed and despite editing, the text may contain inaccuracies due to incorrect word recognition. If further clarification is needed please contact the office at [phone].

## 2022-04-21 ENCOUNTER — OFFICE VISIT (OUTPATIENT)
Dept: BARIATRICS/WEIGHT MGMT | Age: 42
End: 2022-04-21
Payer: COMMERCIAL

## 2022-04-21 VITALS — HEIGHT: 74 IN | BODY MASS INDEX: 40.43 KG/M2 | WEIGHT: 315 LBS

## 2022-04-21 DIAGNOSIS — E66.01 MORBID OBESITY WITH BMI OF 40.0-44.9, ADULT (HCC): Primary | ICD-10-CM

## 2022-04-21 DIAGNOSIS — Z98.84 STATUS POST LAPAROSCOPIC SLEEVE GASTRECTOMY: ICD-10-CM

## 2022-04-21 PROCEDURE — 99213 OFFICE O/P EST LOW 20 MIN: CPT | Performed by: SURGERY

## 2022-04-21 NOTE — PROGRESS NOTES
Dietary Assessment Note    Vitals:   Vitals:    04/21/22 1140   Weight: (!) 321 lb (145.6 kg)   Height: 6' 2\" (1.88 m)    Patient lost 11 lbs over 3 months. 2 months without a refrigerator. Total Weight Loss: 127.4#    Labs reviewed:Results for Robert Thapa (MRN [id number]) as of 4/21/2022 11:44   Ref. Range 8/13/2021 15:43   BUN,BUNPL Latest Ref Range: 7 - 20 mg/dL 6 (L)   Creatinine Latest Ref Range: 0.9 - 1.3 mg/dL 0.8 (L)     Protein intake: 60-80 grams/day     Fluid intake: 48-64 oz/day    Multivitamin/mineral intake: centrum - how many/day: 2    Calcium intake: 2 daily    Other: fiber supplement, 3 stool softener    Exercise: Yes. How much: Walking more (3 days weekly, 2 miles) and playing a game he gets exercise with (15-20 minutes daily- ring fit). Nutrition Assessment: 9 month post-op visit. Not currently tracking calories or protein. Breakfast: protein shake w/ skim milk    Snack: protein bar OR yogurt (carbmaster)    Lunch:  [employer] lobster ravioli     Snack: None    Dinner: chicken cutlet and veggies    Snack: spoon of PB    30-30-30: following  Amount at a time: 10 oz (1 1/2 cups)    Food Intolerances/issues: none    Client Concerns: infrequent BMs and difficulty passing BMs    Goals:    Add probiotic and additional dietary fiber and fluid  Track intakes as needed to make sure diet is still appropriate    Plan: Follow up at 1 year post op      Regan Lopez RD, LD

## 2022-04-21 NOTE — PROGRESS NOTES
[facility]) Physicians   General & Laparoscopic Surgery  [employer] [facility] [employer]       HPI:     Wally Farfan is a very pleasant 46 y.o. obese male , Body mass index is 41.21 kg/m². [employer] And multiple medical problems who is presenting for bariatric follow up care. Wally Farfan is s/p laparoscopic sleeve gastrectomy by me   Comes today to the clinic without any complaints. Patient denies any nausea, vomiting, fevers, chills, shortness of breath, chest pain, constipation or urinary symptoms. Denies any heartburn nor dysphagia. Patient is feeling very well, and is very active. Patient is very pleased with the weight loss and resolution of co-morbid conditions. Past Medical History:   Diagnosis Date    Anxiety     Chronic GERD     Depression     Influenza A 03/27/2017    Kidney stones     Prostatitis     Rectal bleeding     intermittently    Ulcerative colitis ([address]     Unspecified sleep apnea     USES CPAP     Past Surgical History:   Procedure Laterality Date    ABDOMEN SURGERY Bilateral 1985    hernia repair    COLONOSCOPY  2009    tics, ulcerative colitis    COLONOSCOPY  03/2014    [place]-polyp x 2, diverticulosis    ENDOSCOPY, COLON, DIAGNOSTIC      SLEEVE GASTRECTOMY N/A 07/06/2021    [facility] UPPER GASTROINTESTINAL ENDOSCOPY N/A 09/14/2020    EGD BIOPSY performed by Clara Deutsch DO at [facility] ENDOSCOPY     Family History   Problem Relation Age of Onset    Cancer Mother     Asthma Mother     High Blood Pressure Father     Obesity Father     COPD Sister      Social History     Tobacco Use    Smoking status: Never Smoker    Smokeless tobacco: Never Used   Substance Use Topics    Alcohol use: Not Currently     Comment: rarely     I counseled the patient on the importance of not smoking and risks of ETOH. No Known Allergies  Vitals:    04/21/22 1140   Weight: (!) 321 lb (145.6 kg)   Height: 6' 2\" (1.88 m)       Body mass index is 41.21 kg/m².       Current Outpatient Medications:    citalopram (CELEXA) 40 MG tablet, TAKE 1 TABLET BY MOUTH EVERY DAY, Disp: 30 tablet, Rfl: 2    buPROPion (WELLBUTRIN XL) 300 MG extended release tablet, Take 1 tablet by mouth every morning, Disp: 90 tablet, Rfl: 3    tamsulosin (FLOMAX) 0.4 MG capsule, TAKE 1 CAPSULE BY MOUTH EVERY DAY, Disp: , Rfl:     Multiple Vitamin (MULTIVITAMIN, BARIATRIC FUSION COMPLETE, CHEW TAB), Take 4 tablets by mouth daily, Disp: , Rfl:       Review of Systems - History obtained from the patient  General ROS: negative  Psychological ROS: negative  Hematological and Lymphatic ROS: negative  Endocrine ROS: negative  Respiratory ROS: negative  Cardiovascular ROS: negative  Gastrointestinal ROS:negative  Genito-Urinary ROS: negative  Musculoskeletal ROS: negative   Skin ROS: negative    Physical Exam   Vitals Reviewed   Constitutional: Patient is oriented to person, place, and time. Patient appears well-developed and well-nourished. Patient is active and cooperative. Non-toxic appearance. No distress. Neck: Trachea normal and normal range of motion. No JVD present. Pulmonary/Chest: Effort normal. No accessory muscle usage or stridor. No apnea. No respiratory distress. Cardiovascular: Normal rate and no JVD. Abdominal: Normal appearance. Patient exhibits no distension. Abdomen is soft, obese, non tender. Musculoskeletal: Normal range of motion. Patient exhibits no edema. Neurological: Patient is alert and oriented to person, place, and time. Patient has normal strength. GCS eye subscore is 4. GCS verbal subscore is 5. GCS motor subscore is 6. Skin: Skin is warm and dry. No abrasion and no rash noted. Patient is not diaphoretic. No cyanosis or erythema. Psychiatric: Patient has a normal mood and affect. Speech is normal and behavior is normal. Cognition and memory are normal.         A/P     Marti Rogers is 46 y.o. male , now with Body mass index is 41.21 kg/m².   s/p Sleeve gastrectomy, has lost 11 lbs since last visit, total of 127 lbs weight loss. The patient underwent dietary counseling with registered dietician. I have reviewed, discussed and agree with the dietary plan. Patient is trying hard to keep good dietary and behavior modifications. Patient is monitoring portion sizes, food choices and liquid calories. Patient is trying to exercise regularly. Patient pleased with the surgery outcomes. We discussed how his weight affects his overall health including:  Ayush Garcia was seen today for bariatrics post op follow up. Diagnoses and all orders for this visit:    Morbid obesity with BMI of 40.0-44.9, adult ([facility] [address]    Status post laparoscopic sleeve gastrectomy       and importance of weight loss to alleviate those co morbid conditions. I encouraged the patient to continue exercise and keeping healthy eating habits. Also counseled the patient extensively on post surgery care. Total encounter time:  20 minutes including any number of the following: review of labs, imaging, provider notes, outside hospital records; performing examination/evaluation; counseling patient and family; ordering medications/tests; placing referrals and communication with referring physicians; coordination of care, and documentation in the EHR. RTC in 3 months  Diet and Exercise      Patient advised that its their responsibility to follow up for studies and/or labs ordered today.

## 2022-05-12 ENCOUNTER — OFFICE VISIT (OUTPATIENT)
Dept: ENT CLINIC | Age: 42
End: 2022-05-12
Payer: COMMERCIAL

## 2022-05-12 VITALS
SYSTOLIC BLOOD PRESSURE: 129 MMHG | DIASTOLIC BLOOD PRESSURE: 77 MMHG | TEMPERATURE: 97.6 F | BODY MASS INDEX: 42.11 KG/M2 | OXYGEN SATURATION: 99 % | HEART RATE: 95 BPM | WEIGHT: 315 LBS

## 2022-05-12 DIAGNOSIS — H93.13 TINNITUS OF BOTH EARS: Primary | ICD-10-CM

## 2022-05-12 DIAGNOSIS — H93.8X3 SENSATION OF FULLNESS IN BOTH EARS: ICD-10-CM

## 2022-05-12 DIAGNOSIS — J34.89 NASAL OBSTRUCTION: ICD-10-CM

## 2022-05-12 DIAGNOSIS — J34.2 DEVIATED NASAL SEPTUM: ICD-10-CM

## 2022-05-12 DIAGNOSIS — H93.A1 PULSATILE TINNITUS OF RIGHT EAR: ICD-10-CM

## 2022-05-12 PROCEDURE — 99214 OFFICE O/P EST MOD 30 MIN: CPT | Performed by: STUDENT IN AN ORGANIZED HEALTH CARE EDUCATION/TRAINING PROGRAM

## 2022-05-12 RX ORDER — FLUTICASONE PROPIONATE 50 MCG
2 SPRAY, SUSPENSION (ML) NASAL 2 TIMES DAILY
Qty: 16 G | Refills: 5 | Status: SHIPPED | OUTPATIENT
Start: 2022-05-12

## 2022-05-12 NOTE — PROGRESS NOTES
[place]      Patient Name: Lashonda Bradshaw Record Number:  [id number]  Primary Care Physician:  Scooby Moore MD  Date of Consultation: 5/12/2022      Chief Complaint:   Chief Complaint   Patient presents with    Ear Problem     ear aches both ears and heatbeat sound in the right         One Isiah Araiza is a(n) 46 y.o. male who presents today for evaluation of ear issues. Patient states that his ears feel full and he feels like they are clogged. He will have some decreased sense of hearing when he lays down. This is intermittent. He has had this problem before. He has had his ears cleaned in the past and this seems to help his symptoms. 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 He has tried nasal sprays in the past but these did not help. He does not use sinus irrigations or any other medications in his nose. He is not getting nasal bleeding. He will get intermittent sinus infections, usually several per year. I independently reviewed the patients past medical history, past surgical history, and social history. They are unremarkable except as noted in the HPI and below. The patient denies any family history related to the current complaint, and they deny any family history of bleeding disorders or difficulties with anesthesia unless noted below. Update 4/27/2021:    Patient presents today for follow-up. He has been using the nasal steroid sprays and irrigations. He feels like this helped him somewhat. However he is still having some clear nasal drainage and nasal obstruction. He also feels like his ears are still clogged up. He feels like his hearing is down. Update 8/19/2021:    The patient presents today for follow-up regarding his chronic sinonasal complaints. He is no longer using nasal steroid sprays in his nose.   He is still getting intermittent congestion. He feels like his ears are clogged. Update 3/3/2022:    Patient presents today for follow-up. He feels like his ears are clogged again. He notes that he had some decreased hearing and ringing in his ears. He also notes that he has had significant weighting loss since undergoing bariatric surgery. Update 5/12/2022:    Patient presents today for follow-up with a new complaint of pulsatile tinnitus. Is been going on for about 2 weeks. He notes that he is lost 150 pounds since his surgery. He gets some vague ear fullness and pain. He denies ear drainage. He denies hearing loss. He is not feeling lumps or bumps of the head and neck. .  It does not make his symptoms better or worse. He will hear intermittently when he is lying on his side as well as when he is just sitting there.     Patient Active Problem List   Diagnosis    Morbid obesity with BMI of 50.0-59.9, adult ([address]    Anxiety    LAUREN (obstructive sleep apnea)    Neutrophilic leukocytosis    Pain of left leg    Subcutaneous nodule of left lower extremity    Lipoma of abdominal wall    Prediabetes    Vitamin D deficiency     Past Surgical History:   Procedure Laterality Date    ABDOMEN SURGERY Bilateral 1985    hernia repair    COLONOSCOPY  2009    tics, ulcerative colitis    COLONOSCOPY  03/2014    [place]-polyp x 2, diverticulosis    ENDOSCOPY, COLON, DIAGNOSTIC      SLEEVE GASTRECTOMY N/A 07/06/2021    [facility] UPPER GASTROINTESTINAL ENDOSCOPY N/A 09/14/2020    EGD BIOPSY performed by Halie Oswald DO at [facility] ENDOSCOPY     Family History   Problem Relation Age of Onset    Cancer Mother     Asthma Mother     High Blood Pressure Father     Obesity Father     COPD Sister      Social History     Tobacco Use    Smoking status: Never Smoker    Smokeless tobacco: Never Used   Vaping Use    Vaping Use: Never used   Substance Use Topics    Alcohol use: Not Currently     Comment: rarely    Drug use: No        Orders Only on 08/13/2021   Component Date Value Ref Range Status    Color, UA 08/13/2021 YELLOW  Straw/Yellow Final    Clarity, UA 08/13/2021 CLOUDY* Clear Final    Glucose, Ur 08/13/2021 Negative  Negative mg/dL Final    Bilirubin Urine 08/13/2021 Negative  Negative Final    Ketones, Urine 08/13/2021 Negative  Negative mg/dL Final    Specific [place], UA 08/13/2021 1.019  1.005 - 1.030 Final    Blood, Urine 08/13/2021 Negative  Negative Final    pH, UA 08/13/2021 7.0  5.0 - 8.0 Final    Protein, UA 08/13/2021 Negative  Negative mg/dL Final    Urobilinogen, Urine 08/13/2021 1.0  <2.0 E.U./dL Final    Nitrite, Urine 08/13/2021 Negative  Negative Final    Leukocyte Esterase, Urine 08/13/2021 Negative  Negative Final    Microscopic Examination 08/13/2021 YES   Final    Urine Type 08/13/2021 Cleancatch   Final    Comment: Urine received in a tube containing preservative. This preservative will not effect the physical  characteristics of the urine.  Urine Reflex to Culture 08/13/2021 Not Indicated   Final    Total Protein 08/13/2021 7.5  6.4 - 8.2 g/dL Final    Albumin 08/13/2021 4.0  3.4 - 5.0 g/dL Final    Alkaline Phosphatase 08/13/2021 99  40 - 129 U/L Final    ALT 08/13/2021 43* 10 - 40 U/L Final    AST 08/13/2021 27  15 - 37 U/L Final    Total Bilirubin 08/13/2021 0.4  0.0 - 1.0 mg/dL Final    Bilirubin, Direct 08/13/2021 <0.2  0.0 - 0.3 mg/dL Final    Bilirubin, Indirect 08/13/2021 see below  0.0 - 1.0 mg/dL Final    Comment: Indirect Bilirubin cannot be calculated since Total Bilirubin  and/or Direct Bilirubin is below measurable range.       Sodium 08/13/2021 139  136 - 145 mmol/L Final    Potassium 08/13/2021 4.4  3.5 - 5.1 mmol/L Final    Chloride 08/13/2021 102  99 - 110 mmol/L Final    CO2 08/13/2021 26  21 - 32 mmol/L Final    Anion Gap 08/13/2021 11  3 - 16 Final    Glucose 08/13/2021 86  70 - 99 mg/dL Final    BUN 08/13/2021 6* 7 - 20 mg/dL Final    CREATININE 08/13/2021 0.8* 0.9 - 1.3 mg/dL Final    GFR Non- 08/13/2021 >60  >60 Final    Comment: >60 mL/min/1.73m2 EGFR, calc. for ages 25 and older using the  MDRD formula (not corrected for weight), is valid for stable  renal function.  GFR  08/13/2021 >60  >60 Final    Comment: Chronic Kidney Disease: less than 60 ml/min/1.73 sq.m. Kidney Failure: less than 15 ml/min/1.73 sq.m. Results valid for patients 18 years and older.  Calcium 08/13/2021 9.8  8.3 - 10.6 mg/dL Final    Crystals, UA 08/13/2021 2+ Ca. Oxalate* None Seen /HPF Final    Urinalysis Comments 08/13/2021 see below   Final    Automated urinalysis results confirmed by microscopic.  Hyaline Casts, UA 08/13/2021 2  0 - 8 /LPF Final    WBC, UA 08/13/2021 2  0 - 5 /HPF Final    RBC, UA 08/13/2021 2  0 - 4 /HPF Final    Epithelial Cells, UA 08/13/2021 2  0 - 5 /HPF Final    Comment: Urinalysis microscopic performed using the  automated methodology (AUWI analyzer). DRUG/FOOD ALLERGIES: Patient has no known allergies. CURRENT MEDICATIONS  Prior to Admission medications    Medication Sig Start Date End Date Taking?  Authorizing Provider   citalopram (CELEXA) 40 MG tablet TAKE 1 TABLET BY MOUTH EVERY DAY 12/3/21  Yes Ken Tee MD   buPROPion (WELLBUTRIN XL) 300 MG extended release tablet Take 1 tablet by mouth every morning 11/8/21  Yes Ken Tee MD   tamsulosin (FLOMAX) 0.4 MG capsule TAKE 1 CAPSULE BY MOUTH EVERY DAY 9/24/21  Yes Historical Provider, MD   Multiple Vitamin (MULTIVITAMIN, BARIATRIC FUSION COMPLETE, CHEW TAB) Take 4 tablets by mouth daily   Yes Historical Provider, MD       REVIEW OF SYSTEMS  The following systems were reviewed and revealed the following in addition to any already discussed in the HPI:    CONSTITUTIONAL: no weight loss, no fever, no night sweats, no chills  EYES: no vision changes, no blurry vision  EARS: no changes in hearing, no otalgia  NOSE: no epistaxis, no rhinorrhea  RESPIRATORY: no  Difficulty breathing, no shortness of breath  CV: no chest pain, no Peripheral vascular disease  HEME: No coagulation disorder, no Bleeding disorder  NEURO: no TIA or stroke-like symptoms  SKIN: No new rashes in the head and neck, no recent skin cancers  MOUTH: No new ulcers, no recent teeth infections  GASTROINTESTINAL: No diarrhea, stomach pain  PSYCH: No anxiety, no depression      PHYSICAL EXAM  /77 (Site: Right Upper Arm, Position: Sitting, Cuff Size: Medium Adult)   Pulse 95   Temp 97.6 °F (36.4 °C) (Temporal)   Wt (!) 328 lb (148.8 kg)   SpO2 99%   BMI 42.11 kg/m²     GENERAL: No acute distress, alert and oriented, no hoarseness, strong voice  EYES: EOMI, Anti-icteric  HENT:   Head: Normocephalic and atraumatic. Face:  Symmetric, facial nerve intact, no sinus tenderness      PROCEDURE    Binocular otoscopic exam CPT 92581: The right ear was examined with the binocular otoscope. The external auditory canal was normal.  The tympanic membrane was intact with an aerated middle ear. The left ear was then examined. The external auditory canal was normal.  The tympanic membrane was intact with an aerated middle ear. No evidence of movement of the tympanic membrane with forced respiration      Nasal Endoscopy (CPT code 01458) from initial visit    Preop: chronic rhinitis  Postop: Same    Verbal consent was received. After topical anesthesia and decongestion had been obtained using aerosolized 1% lidocaine and oxymetazoline, a 45 degree rigid endoscope was placed into both nares with the patient in a sitting position.  The following was observed:    Right Nasal Cavity and Paranasal Sinuses:  Polyp score = 0 (0 = no polyps, 1 = small polyps in middle meatus not reaching below the inferior border of the middle ramez, 2 = polyps reaching below the middle border of the middle turbinate, 3= large polyps reaching the lower border of the inferior turbinate or polyps medial to the middle ramez, 4= large polyps causing almost complete congestion/obstruction of the interior meatus)  Edema score = 2 (0 = absent, 1 = mild, 2 = severe)  Discharge score = 1 (0 = no discharge, 1 = clear thin discharge, 2 = thick purulent discharge)    Left Nasal Cavity and Paranasal Sinuses:    Polyp score = 0 (0 = no polyps, 1 = small polyps in middle meatus not reaching below the inferior border of the middle ramez, 2 = polyps reaching below the middle border of the middle turbinate, 3= large polyps reaching the lower border of the inferior turbinate or polyps medial to the middle ramez, 4= large polyps causing almost complete congestion/obstruction of the interior meatus)  Edema score = 2 (0 = absent, 1 = mild, 2 = severe)  Discharge score = 1 (0 = no discharge, 1 = clear thin discharge, 2 = thick purulent discharge)    Septum: intact and deviated to the right  Other:   -The inferior and middle turbinates were examined. The middle meatus, and sphenoethmoid recess was examined bilaterally.    -There is evidence of moderate sinonasal inflammation with inferior turbinate hypertrophy and septal deviation to the right.   -There were no complications. Tolerated well without complication. I attest that I was present for and did the entire procedure myself. ASSESSMENT/PLAN  1. Pulsatile tinnitus of right ear, new      2. Nasal obstruction      3. Hypertrophy of both inferior nasal turbinates      4. Sensation of fullness in both ears      5. Chronic rhinitis      6. Deviated nasal septum      7. Bilateral impacted cerumen-resolved    This very pleasant 77-year-old gentleman here today for evaluation of multiple complaints related to his ears and nose. Today, we discussed his pulsatile tinnitus. We discussed that his sudden weight loss in the setting of his bariatric surgery predisposes him to a phenomenon called patulous eustachian tube.   This is where the normal valve mechanism of the eustachian tube is impacted. Typically patients will get autophagy, but sometimes they can get pulsatile tinnitus. Discussed other potential etiologies of pulsatile tinnitus including venous or arterial abnormalities and rare things such as AVM or glomus. These to be diagnosed with imaging such as an MRI IAC or MRV/MRA. Other possible causes include allergy. The patient has known nasal congestion and sinus inflammation. I have asked him to use his nasal steroid spray. A prescription was provided for the patient. Medical Decision Making: The following items were considered in medical decision making including or in addition to what was noted in the assessment and plan:   -Independent review of images  -Review / order clinical lab tests  -Review / order radiology tests  -Decision to obtain old records  -Review and summation of old records as accessed through [facility] and pertinent information was summarized in the note    This note was generated completely or in part utilizing Dragon dictation speech recognition software. Occasionally, words are mistranscribed and despite editing, the text may contain inaccuracies due to incorrect word recognition. If further clarification is needed please contact the office at [phone].

## 2022-07-21 ENCOUNTER — OFFICE VISIT (OUTPATIENT)
Dept: BARIATRICS/WEIGHT MGMT | Age: 42
End: 2022-07-21
Payer: COMMERCIAL

## 2022-07-21 VITALS
WEIGHT: 315 LBS | HEIGHT: 74 IN | DIASTOLIC BLOOD PRESSURE: 83 MMHG | HEART RATE: 67 BPM | BODY MASS INDEX: 40.43 KG/M2 | SYSTOLIC BLOOD PRESSURE: 145 MMHG

## 2022-07-21 DIAGNOSIS — Z98.84 STATUS POST LAPAROSCOPIC SLEEVE GASTRECTOMY: ICD-10-CM

## 2022-07-21 DIAGNOSIS — E66.01 MORBID OBESITY WITH BMI OF 40.0-44.9, ADULT (HCC): Primary | ICD-10-CM

## 2022-07-21 PROCEDURE — 99213 OFFICE O/P EST LOW 20 MIN: CPT | Performed by: SURGERY

## 2022-07-21 NOTE — PATIENT INSTRUCTIONS
Patient received dietary handouts and education.     Goals:   - Structure meals, avoid grazing  - Eat about every 4 hours

## 2022-07-21 NOTE — PROGRESS NOTES
Dietary Assessment Note  Vitals:   Vitals:    07/21/22 0858   BP: (!) 145/83   Pulse: 67   Weight: (!) 339 lb 4.8 oz (153.9 kg)   Height: 6' 2\" (1.88 m)   Patient gained 18.3 lbs over ~3 months. Total Weight Loss: 109.1 lbs    Labs reviewed: no new labs    Protein intake: 60-80 grams/day - 1-2 protein shakes daily w/ 8oz skim milk    Fluid intake: >64 oz/day- powerade zero    Multivitamin/mineral intake: yes - 2 centrum    Calcium intake: yes - 2 citrcal max plus    Other: none    Exercise: yes - walks a bit & started lifting wts again / walked 8 miles at [facility]  / swims 2x/wk     Nutrition Assessment: 1 year post-op visit. Pt has been on steroids for ~4 weeks d/t ENT issues, increased appetite. Recognizes some grazing. Overall feels great, happy he was able to go to [employer] ride rides, wants to [facility] to [facility]. Pt finds it hard to stay consistent with tracking. B- protein shake OR oatmeal  S- none  L- protein bar  S- frozen veggies & crab meat   S- chili  S- 1-2 cheese stick  D- chili  S- frozen veggies & crab meat    Amount able to eat per sitting: ~10oz    Following 30/30/30 rule: yes    Food Intolerances/issues: none    Client Concerns: constipation, painful BMs, maxed on stool softeners / takes 2 fiber supplements daily    Goals:   - Structure meals, avoid grazing  - Eat about every 4 hours  *Encouraged tracking.     Wt. goal 230-250 lb    Plan: f/u as directed    Selma Kim, GOPI, LD

## 2022-07-25 NOTE — PROGRESS NOTES
[address] [facility] [facility]   General & Laparoscopic Surgery  [employer]       HPI:     Nasrin Beckwith is a very pleasant 46 y.o. obese male , Body mass index is 43.56 kg/m². S[facility] And multiple medical problems who is presenting for bariatric follow up care. Nasrin Beckwith is s/p laparoscopic sleeve gastrectomy by me   Comes today to the clinic without any complaints. Patient denies any nausea, vomiting, fevers, chills, shortness of breath, chest pain, constipation or urinary symptoms. Denies any heartburn nor dysphagia. Patient had a busy last few months at work and was not meal planning/prepping, and had less physical activity      Past Medical History:   Diagnosis Date    Anxiety     Chronic GERD     Depression     Influenza A 03/27/2017    Kidney stones     Prostatitis     Rectal bleeding     intermittently    Ulcerative colitis ([address]     Unspecified sleep apnea     USES CPAP     Past Surgical History:   Procedure Laterality Date    ABDOMEN SURGERY Bilateral 1985    hernia repair    COLONOSCOPY  2009    tics, ulcerative colitis    COLONOSCOPY  03/2014    O'Story-polyp x 2, diverticulosis    ENDOSCOPY, COLON, DIAGNOSTIC      SLEEVE GASTRECTOMY N/A 07/06/2021    Cadence Paige    UPPER GASTROINTESTINAL ENDOSCOPY N/A 09/14/2020    EGD BIOPSY performed by Monserrat Garcia DO at [facility] ENDOSCOPY     Family History   Problem Relation Age of Onset    Cancer Mother     Asthma Mother     High Blood Pressure Father     Obesity Father     COPD Sister      Social History     Tobacco Use    Smoking status: Never    Smokeless tobacco: Never   Substance Use Topics    Alcohol use: Not Currently     Comment: rarely     I counseled the patient on the importance of not smoking and risks of ETOH. No Known Allergies  Vitals:    07/21/22 0858   BP: (!) 145/83   Pulse: 67   Weight: (!) 339 lb 4.8 oz (153.9 kg)   Height: 6' 2\" (1.88 m)       Body mass index is 43.56 kg/m².       Current Outpatient Medications:     citalopram (CELEXA) 40 MG tablet, TAKE 1 TABLET BY MOUTH EVERY DAY, Disp: 30 tablet, Rfl: 2    buPROPion (WELLBUTRIN XL) 300 MG extended release tablet, Take 1 tablet by mouth every morning, Disp: 90 tablet, Rfl: 3    tamsulosin (FLOMAX) 0.4 MG capsule, TAKE 1 CAPSULE BY MOUTH EVERY DAY, Disp: , Rfl:     Multiple Vitamin (MULTIVITAMIN, BARIATRIC FUSION COMPLETE, CHEW TAB), Take 4 tablets by mouth daily, Disp: , Rfl:     fluticasone (FLONASE) 50 MCG/ACT nasal spray, 2 sprays by Nasal route 2 times daily (Patient not taking: Reported on 7/21/2022), Disp: 16 g, Rfl: 5      Review of Systems - History obtained from the patient  General ROS: negative  Psychological ROS: negative  Hematological and Lymphatic ROS: negative  Endocrine ROS: negative  Respiratory ROS: negative  Cardiovascular ROS: negative  Gastrointestinal ROS:negative  Genito-Urinary ROS: negative  Musculoskeletal ROS: negative   Skin ROS: negative    Physical Exam   Vitals Reviewed   Constitutional: Patient is oriented to person, place, and time. Patient appears well-developed and well-nourished. Patient is active and cooperative. Non-toxic appearance. No distress. Neck: Trachea normal and normal range of motion. No JVD present. Pulmonary/Chest: Effort normal. No accessory muscle usage or stridor. No apnea. No respiratory distress. Cardiovascular: Normal rate and no JVD. Abdominal: Normal appearance. Patient exhibits no distension. Abdomen is soft, obese, non tender. Musculoskeletal: Normal range of motion. Patient exhibits no edema. Neurological: Patient is alert and oriented to person, place, and time. Patient has normal strength. GCS eye subscore is 4. GCS verbal subscore is 5. GCS motor subscore is 6. Skin: Skin is warm and dry. No abrasion and no rash noted. Patient is not diaphoretic. No cyanosis or erythema. Psychiatric: Patient has a normal mood and affect.  Speech is normal and behavior is normal. Cognition and memory are normal.         A/P     Caitlynjese Elliott B Jimmy Cuevas is 46 y.o. male , now with Body mass index is 43.56 kg/m². s/p Sleeve gastrectomy, has lost gained 18 lbs since last visit, total of 109 lbs weight loss. The patient underwent dietary counseling with registered dietician. I have reviewed, discussed and agree with the dietary plan. We discussed how his weight affects his overall health including:  Ken Valencia was seen today for bariatrics post op follow up. Diagnoses and all orders for this visit:    Morbid obesity with BMI of 40.0-44.9, adult ([facility] [address]    Status post laparoscopic sleeve gastrectomy       and importance of weight loss to alleviate those co morbid conditions. I encouraged the patient to continue exercise and keeping healthy eating habits. Also counseled the patient extensively on post surgery care. Total encounter time:  20 minutes including any number of the following: review of labs, imaging, provider notes, outside hospital records; performing examination/evaluation; counseling patient and family; ordering medications/tests; placing referrals and communication with referring physicians; coordination of care, and documentation in the EHR. RTC in 3 months  Diet and Exercise      Patient advised that its their responsibility to follow up for studies and/or labs ordered today.

## 2022-08-23 DIAGNOSIS — H91.90 HEARING LOSS, UNSPECIFIED HEARING LOSS TYPE, UNSPECIFIED LATERALITY: Primary | ICD-10-CM

## 2022-08-25 ENCOUNTER — PROCEDURE VISIT (OUTPATIENT)
Dept: AUDIOLOGY | Age: 42
End: 2022-08-25
Payer: COMMERCIAL

## 2022-08-25 ENCOUNTER — OFFICE VISIT (OUTPATIENT)
Dept: ENT CLINIC | Age: 42
End: 2022-08-25
Payer: COMMERCIAL

## 2022-08-25 VITALS
HEART RATE: 61 BPM | HEIGHT: 74 IN | BODY MASS INDEX: 40.43 KG/M2 | WEIGHT: 315 LBS | DIASTOLIC BLOOD PRESSURE: 65 MMHG | RESPIRATION RATE: 16 BRPM | SYSTOLIC BLOOD PRESSURE: 107 MMHG

## 2022-08-25 DIAGNOSIS — H93.A3 PULSATILE TINNITUS OF BOTH EARS: Primary | ICD-10-CM

## 2022-08-25 DIAGNOSIS — R42 DIZZINESS: Primary | ICD-10-CM

## 2022-08-25 DIAGNOSIS — H93.13 TINNITUS OF BOTH EARS: ICD-10-CM

## 2022-08-25 DIAGNOSIS — H93.8X3 SENSATION OF FULLNESS IN BOTH EARS: ICD-10-CM

## 2022-08-25 PROCEDURE — 92557 COMPREHENSIVE HEARING TEST: CPT | Performed by: AUDIOLOGIST

## 2022-08-25 PROCEDURE — 99214 OFFICE O/P EST MOD 30 MIN: CPT | Performed by: STUDENT IN AN ORGANIZED HEALTH CARE EDUCATION/TRAINING PROGRAM

## 2022-08-25 PROCEDURE — 92567 TYMPANOMETRY: CPT | Performed by: AUDIOLOGIST

## 2022-08-25 PROCEDURE — 97140 MANUAL THERAPY 1/> REGIONS: CPT | Performed by: STUDENT IN AN ORGANIZED HEALTH CARE EDUCATION/TRAINING PROGRAM

## 2022-08-25 NOTE — PATIENT INSTRUCTIONS
Tinnitus: Overview and Management Strategies          Many people have some ringing sounds in their ears once in a while. You may hear a roar, a hiss, a tinkle, or a buzz. The sound usually lasts only a few minutes. If it goes on all the time, you may have tinnitus. Tinnitus is usually caused by long-term exposure to loud noise. This damages the nerves in the inner ear. It can occur with all types of hearing loss. It may be a symptom of almost any ear problem. Tinnitus may be caused by a buildup of earwax. Or, it may be caused by ear infections or certain medicines (especially antibiotics or large amounts of aspirin). You can also hear noises in your ears because of an injury to the ears, drinking too much alcohol or caffeine, or a medical condition. Other conditions may also contribute to tinnitus, including: head and neck trauma, temporomandibular joint disorder (TMJ), sinus pressure and barometric trauma, traumatic brain injury, metabolic disorders, autoimmune disorders, stress, and high blood pressure. You may need tests to evaluate your hearing and to find causes of long-lasting tinnitus. Your doctor may suggest one or more treatments to help you cope with the tinnitus. You can also do things at home to help reduce symptoms. Causes of Tinnitus  We do not know the exact cause of tinnitus. One thing we do know is that you are not imagining it. If you have tinnitus, chances are the cause will remain a mystery. Conditions that might cause tinnitus include the following:   Hearing loss   Ménière's disease   Loud noise exposure   Migraines   Head injury   Drugs or medicines that are toxic to hearing   Anemia   High blood pressure   Stress   A lot of wax in the ear   Certain types of tumors   Having a lot of caffeine   Smoking cigarettes  You may find that your tinnitus is worse at night. This happens because it is quiet and you are not distracted.  Feeling tired and stressed may make your tinnitus worse.    Follow-up care is a key part of your treatment and safety. Be sure to make and go to all appointments, and call your doctor if you are having problems. It's also a good idea to know your test results and keep a list of the medicines you take. How can you care for yourself at home? Limit or cut out alcohol, caffeine, and sodium. They can make your symptoms worse. Do not smoke or use other tobacco products. Nicotine reduces blood flow to the ear and makes tinnitus worse. If you need help quitting, talk to your doctor about stop-smoking programs and medicines. These can increase your chances of quitting for good. Talk to your doctor about whether to stop taking aspirin and similar products such as ibuprofen or naproxen. Get exercise often. It can help improve blood flow to the ear. Hearing Aids and Other Devices  A hearing aid may help your tinnitus if you have a hearing loss. An audiologist can help you find and use the best hearing aid for you. Tinnitus maskers look like hearing aids. They make a sound that masks, or covers up, the tinnitus. The masking sound distracts you from the ringing in your ears. You may be able to use a masker and a hearing aid at the same time. Sound machines can be useful at night or during quiet times. There are machines you can buy at the store. Or, you can find apps on your phone that make sounds, like the ocean or rainfall. Fish tanks, fans, quiet music, and indoor waterfalls can help, as well. Ways to manage/cope with tinnitus  Some tinnitus may last a long time. To manage your tinnitus, try to: Avoid noises that you think caused your tinnitus. If you can't avoid loud noises, wear earplugs or earmuffs. Ignore the sound by paying attention to other things. Keeping your brain busy with other tasks or background noise can help your brain not focus on the tinnitus. Try to not give the tinnitus an emotional reaction.   Do your best to ignore the sound and not let after an ear injury. Your tinnitus bothers you enough that you want to take medicines to help you cope with it. If you notice changes in your tinnitus and/or your hearing, it is recommended that you have your hearing tested by your audiologist and to follow-up with your physician that manages your hearing loss (such as your ENT or Primary Care doctor). Dizziness: Care Instructions  Your Care Instructions  Dizziness is the feeling of unsteadiness or fuzziness in your head. It is different than having vertigo, which is a feeling that the room is spinning or that you are moving or falling. It is also different from lightheadedness, which is the feeling that you are about to faint. It can be hard to know what causes dizziness. Some people feel dizzy when they have migraine headaches. Sometimes bouts of flu can make you feel dizzy. Some medical conditions, such as heart problems or high blood pressure, can make you feel dizzy. Many medicines can cause dizziness, including medicines for high blood pressure, pain, or anxiety. If a medicine causes your symptoms, your doctor may recommend that you stop or change the medicine. If it is a problem with your heart, you may need medicine to help your heart work better. If there is no clear reason for your symptoms, your doctor may suggest watching and waiting for a while to see if the dizziness goes away on its own. Follow-up care is a key part of your treatment and safety. Be sure to make and go to all appointments, and call your doctor if you are having problems. It's also a good idea to know your test results and keep a list of the medicines you take. How can you care for yourself at home? If your doctor recommends or prescribes medicine, take it exactly as directed. Call your doctor if you think you are having a problem with your medicine. Do not drive while you feel dizzy. Try to prevent falls.  Steps you can take include:  Using nonskid mats, adding grab bars near the tub, and using night-lights. Clearing your home so that walkways are free of anything you might trip on. Letting family and friends know that you have been feeling dizzy. This will help them know how to help you. When should you call for help? Call 911 anytime you think you may need emergency care. For example, call if:    You passed out (lost consciousness). You have dizziness along with symptoms of a heart attack. These may include:  Chest pain or pressure, or a strange feeling in the chest.  Sweating. Shortness of breath. Nausea or vomiting. Pain, pressure, or a strange feeling in the back, neck, jaw, or upper belly or in one or both shoulders or arms. Lightheadedness or sudden weakness. A fast or irregular heartbeat. You have symptoms of a stroke. These may include:  Sudden numbness, tingling, weakness, or loss of movement in your face, arm, or leg, especially on only one side of your body. Sudden vision changes. Sudden trouble speaking. Sudden confusion or trouble understanding simple statements. Sudden problems with walking or balance. A sudden, severe headache that is different from past headaches. Call your doctor now or seek immediate medical care if:    You feel dizzy and have a fever, headache, or ringing in your ears. You have new or increased nausea and vomiting. Your dizziness does not go away or comes back. Watch closely for changes in your health, and be sure to contact your doctor if:    You do not get better as expected. Where can you learn more? Go to https://Rewardlidave.Additech. org and sign in to your FIZZA account. Enter E379 in the [employer] box to learn more about \"Dizziness: Care Instructions. \"     If you do not have an account, please click on the \"Sign Up Now\" link. Current as of: September 23, 2018  Content Version: 11.9  © 7314-1375 [employer].  Care instructions adapted under license by [facility] ([facility]). If you have questions about a medical condition or this instruction, always ask your healthcare professional. [address] any warranty or liability for your use of this information. Patient Education        Preventing Falls: Care Instructions  Your Care Instructions     Getting around your home safely can be a challenge if you have injuries or health problems that make it easy for you to fall. Loose rugs and furniture in walkways are among the dangers for many older people who have problems walking or who have poor eyesight. People who have conditions such as arthritis,osteoporosis, or dementia also have to be careful not to fall. You can make your home safer with a few simple measures. Follow-up care is a key part of your treatment and safety. Be sure to make and go to all appointments, and call your doctor if you are having problems. It's also a good idea to know your test results and keep alist of the medicines you take. How can you care for yourself at home? Taking care of yourself  Exercise regularly to improve your strength, muscle tone, and balance. Walk if you can. Swimming may be a good choice if you cannot walk easily. Have your vision and hearing checked each year or any time you notice a change. If you have trouble seeing and hearing, you might not be able to avoid objects and could lose your balance. Know the side effects of the medicines you take. Ask your doctor or pharmacist whether the medicines you take can affect your balance. Sleeping pills or sedatives can affect your balance. Limit the amount of alcohol you drink. Alcohol can impair your balance and other senses. Ask your doctor whether calluses or corns on your feet need to be removed. If you wear loose-fitting shoes because of calluses or corns, you can lose your balance and fall. Talk to your doctor if you have numbness in your feet.   You may get dizzy if you do not drink enough water. To prevent dehydration, drink plenty of fluids. Choose water and other clear liquids. If you have kidney, heart, or liver disease and have to limit fluids, talk with your doctor before you increase the amount of fluids you drink. Preventing falls at home  Remove raised doorway thresholds, throw rugs, and clutter. Repair loose carpet or raised areas in the floor. Move furniture and electrical cords to keep them out of walking paths. Use nonskid floor wax, and wipe up spills right away, especially on ceramic tile floors. If you use a walker or cane, put rubber tips on it. If you use crutches, clean the bottoms of them regularly with an abrasive pad, such as steel wool. Keep your house well lit, especially stairways, porches, and outside walkways. Use night-lights in areas such as hallways and bathrooms. Add extra light switches or use remote switches (such as switches that go on or off when you clap your hands) to make it easier to turn lights on if you have to get up during the night. Install sturdy handrails on stairways. Move items in your cabinets so that the things you use a lot are on the lower shelves (about waist level). Keep a cordless phone and a flashlight with new batteries by your bed. If possible, put a phone in each of the main rooms of your house, or carry a cell phone in case you fall and cannot reach a phone. Or, you can wear a device around your neck or wrist. You push a button that sends a signal for help. Wear low-heeled shoes that fit well and give your feet good support. Use footwear with nonskid soles. Check the heels and soles of your shoes for wear. Repair or replace worn heels or soles. Do not wear socks without shoes on wood floors. Walk on the grass when the sidewalks are slippery. If you live in an area that gets snow and ice in the winter, sprinkle salt on slippery steps and sidewalks. Or ask a family member or friend to do this for you.   Preventing falls in the bath  Install grab bars and nonskid mats inside and outside your shower or tub and near the toilet and sinks. Use shower chairs and bath benches. Use a hand-held shower head that will allow you to sit while showering. Get into a tub or shower by putting the weaker leg in first. Get out of a tub or shower with your strong side first.  Repair loose toilet seats and consider installing a raised toilet seat to make getting on and off the toilet easier. Keep your bathroom door unlocked while you are in the shower. Where can you learn more? Go to https://DesignlabpepicReliable Tire Disposaleb.mygall. org and sign in to your ImmuVen account. Enter 0476 [id number] [phone] in the [employer] box to learn more about \"Preventing Falls: Care Instructions. \"     If you do not have an account, please click on the \"Sign Up Now\" link. Current as of: September 8, 2021               Content Version: 13.3  © 2006-2022 Healthwise, Incorporated. Care instructions adapted under license by [facility] ([facility]). If you have questions about a medical condition or this instruction, always ask your healthcare professional. [place] [address] any warranty or liability for your use of this information.

## 2022-08-25 NOTE — PROGRESS NOTES
Cindy Alford   1980, 46 y.o. male   [id number]       Referring Provider: Michelle Toney MD  Referral Type: In an order in [address] [place] [address]    Reason for Visit: Evaluation of suspected change in hearing, tinnitus, or balance. ADULT AUDIOLOGIC EVALUATION      Cindy Alford is a 46 y.o. male seen today, 8/25/2022 , for an initial audiologic evaluation. Patient was seen by Michelle Toney MD following today's evaluation. AUDIOLOGIC AND OTHER PERTINENT MEDICAL HISTORY:      Cindy Alford noted aural fullness, tinnitus, dizziness, imbalance, and history of ear surgery. Patient reports a heart beat sensation that is greater in the right ear. He also notes unsteadiness when changing position from sitting to standing and when laying down. Medical history is reportedly significant for PE tube in the right ear. Cindy Alford denied otalgia, otorrhea, history of falls, history of significant noise exposure, history of head trauma, and family history of hearing loss. Date: 8/25/2022     IMPRESSIONS:      AU: Hearing WNL, Excellent WRS, Type A tymp    Test results consistent with hearing within normal limits. Discussed test results with patient. Patient to follow medical recommendations per Michelle Toney MD .    ASSESSMENT AND FINDINGS:     Otoscopy revealed: Clear ear canals bilaterally    RIGHT EAR:  Hearing Sensitivity: Normal hearing sensitivity   Speech Recognition Threshold: 15 dB HL  Word Recognition:Excellent (100%), based on NU-6 25-word list at 50 dBHL using recorded speech stimuli. Tympanometry: Normal peak pressure and compliance, Type A tympanogram, consistent with normal middle ear function. Acoustic Reflexes: Ipsilateral: Could not maintain seal. Contralateral: Could not maintain seal.    LEFT EAR:  Hearing Sensitivity: Normal hearing sensitivity   Speech Recognition Threshold: 20 dB HL  Word Recognition:Excellent (100%), based on NU-6 25-word list at 50 dBHL using recorded speech stimuli. Tympanometry: Normal peak pressure and compliance, Type A tympanogram, consistent with normal middle ear function. Acoustic Reflexes: Ipsilateral: Could not maintain seal. Contralateral: Could not maintain seal.    Reliability: Good  Transducer: HF Headphones    See scanned audiogram dated 8/25/2022  for results. PATIENT EDUCATION:     The following items were discussed with the patient:   - Good Communication Strategies  - Tinnitus Management Strategies    - Fall Risk and Prevention   - Dizziness    Educational information was shared in the After Visit Summary. RECOMMENDATIONS:                                                                                                                                                                                                                                                          The following items are recommended based on patient report and results from today's appointment:   - Continue medical follow-up with Boo Faulkner MD.   - Retest hearing as medically indicated and/or sooner if a change in hearing is noted. - Maintain a sound enriched environment to assist in the management of tinnitus symptoms.  - Use hearing protection devices (HPDs), such as protective ear muffs and ear plugs, when exposed to dangerous sound levels. - If medically indicated, consider vestibular evaluation to further investigate symptoms of dizziness.        Holley Romano  Audiologist    Chart CC'd to: Boo Faulkner MD      Degree of   Hearing Sensitivity dB Range   Within Normal Limits (WNL) 0 - 20   Mild 20 - 40   Moderate 40 - 55   Moderately-Severe 55 - 70   Severe 70 - 90   Profound 90 +

## 2022-08-25 NOTE — PROGRESS NOTES
[place]      Patient Name: Robel[address] S Luvernecosme Bradshaw Record Number:  [id number]  Primary Care Physician:  Carly Cartwright MD  Date of Consultation: 8/25/2022      Chief Complaint:   Chief Complaint   Patient presents with    Hearing Problem        HISTORY OF PRESENT ILLNESS  Evelio Mesa is a(n) 46 y.o. male who presents today for evaluation of ear issues. Patient states that his ears feel full and he feels like they are clogged. He will have some decreased sense of hearing when he lays down. This is intermittent. He has had this problem before. He has had his ears cleaned in the past and this seems to help his symptoms. 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 He has tried nasal sprays in the past but these did not help. He does not use sinus irrigations or any other medications in his nose. He is not getting nasal bleeding. He will get intermittent sinus infections, usually several per year. I independently reviewed the patients past medical history, past surgical history, and social history. They are unremarkable except as noted in the HPI and below. The patient denies any family history related to the current complaint, and they deny any family history of bleeding disorders or difficulties with anesthesia unless noted below. Update 4/27/2021:    Patient presents today for follow-up. He has been using the nasal steroid sprays and irrigations. He feels like this helped him somewhat. However he is still having some clear nasal drainage and nasal obstruction. He also feels like his ears are still clogged up. He feels like his hearing is down. Update 8/19/2021:    The patient presents today for follow-up regarding his chronic sinonasal complaints. He is no longer using nasal steroid sprays in his nose. He is still getting intermittent congestion.   He feels like his ears are clogged. Update 3/3/2022:    Patient presents today for follow-up. He feels like his ears are clogged again. He notes that he had some decreased hearing and ringing in his ears. He also notes that he has had significant weighting loss since undergoing bariatric surgery. Update 5/12/2022:    Patient presents today for follow-up with a new complaint of pulsatile tinnitus. Is been going on for about 2 weeks. He notes that he is lost 150 pounds since his surgery. He gets some vague ear fullness and pain. He denies ear drainage. He denies hearing loss. He is not feeling lumps or bumps of the head and neck. .  It does not make his symptoms better or worse. He will hear intermittently when he is lying on his side as well as when he is just sitting there. Update 8/25/22:  -Here today for persistent pulsatile tinnitus of both ears. It is getting worse since I saw him last.  He denies exacerbating symptoms. His symptoms do not improve with lying down. Patient states is affecting his ability to work. H which showed normal hearing e had an audiogram from today          REVIEW OF SYSTEMS  The following systems were reviewed and revealed the following in addition to any already discussed in the HPI:          PHYSICAL EXAM  /65   Pulse 61   Resp 16   Ht 6' 2\" (1.88 m)   Wt (!) 344 lb (156 kg)   BMI 44.17 kg/m²     GENERAL: No acute distress, alert and oriented, no hoarseness, strong voice  EYES: EOMI, Anti-icteric  HENT:   Head: Normocephalic and atraumatic. Face:  Symmetric, facial nerve intact, no sinus tenderness  No palpable neck masses. Auscultation revealed no bruits. PROCEDURE      No evidence of movement of the tympanic membrane with forced respiration      Nasal Endoscopy (CPT code 78636) from initial visit    Preop: chronic rhinitis  Postop: Same    Verbal consent was received.  After topical anesthesia and decongestion had been obtained using aerosolized 1% lidocaine and may contain inaccuracies due to incorrect word recognition. If further clarification is needed please contact the office at [phone].

## 2022-08-25 NOTE — Clinical Note
Dr. Stanislav Trejo,  Please see note from this patient's audiogram from today. Please let me know if there is anything further you need.     Holley Neff Audiologist

## 2022-09-06 ENCOUNTER — HOSPITAL ENCOUNTER (OUTPATIENT)
Dept: MRI IMAGING | Age: 42
Discharge: HOME | End: 2022-09-06
Payer: COMMERCIAL

## 2022-09-06 DIAGNOSIS — H93.A3 PULSATILE TINNITUS OF BOTH EARS: ICD-10-CM

## 2022-09-06 PROCEDURE — 6360000004 HC RX CONTRAST MEDICATION: Performed by: STUDENT IN AN ORGANIZED HEALTH CARE EDUCATION/TRAINING PROGRAM

## 2022-09-06 PROCEDURE — A9577 INJ MULTIHANCE: HCPCS | Performed by: STUDENT IN AN ORGANIZED HEALTH CARE EDUCATION/TRAINING PROGRAM

## 2022-09-06 PROCEDURE — 70553 MRI BRAIN STEM W/O & W/DYE: CPT

## 2022-09-06 RX ADMIN — GADOBENATE DIMEGLUMINE 20 ML: 529 INJECTION, SOLUTION INTRAVENOUS at 17:22

## 2022-09-27 ENCOUNTER — TELEPHONE (OUTPATIENT)
Dept: ENT CLINIC | Age: 42
End: 2022-09-27

## 2022-09-27 NOTE — TELEPHONE ENCOUNTER
Patient notified per Dr. Richard Hernandez  Please call the patient and let him know the MRI did not show anything abnormal that might be causing his symptoms. Our next option would be to pursue more CT scans, but those may not show a definitive answer for why he has the pulsatile tinnitus.

## 2022-10-18 NOTE — PROGRESS NOTES
Dietary Assessment Note  This eval was conducted via phone on 10/18/22 in preparation for their visit on 10/20/22 with Dr. Rosey Suarez. Vitals: CW: 350 lb, per pt report. Patient gained ~10.7 lbs over past ~3 months. Total Weight Loss: 98.4 lbs    Labs reviewed: no new    Protein intake: 60-80 grams/day    Fluid intake: >64 oz/day- gatorade zero    Multivitamin/mineral intake: yes - 2 centrum    Calcium intake: yes - 2 citracal max plus    Other: none    Exercise: yes - gym 4x/wk - walking at least 3 miles & lifting    Nutrition Assessment: 1 year post-op visit. Pt states he is really struggling with increased appetite and increased volume at meals. Pt feels the grazing issues are better. Pt states work has been really busy so having long periods between meals/snacks and then he is starving. Discussed using protein shakes btw meals to decrease extreme hunger and over eating.     Gets up 845a   1-130p- chili w/ cheese (16oz)  330p- pork cutlet ~16oz  6-7p- bowl of oatmeal OR yogurt OR PB  930p- 12oz pork cutlet OR vegetables w/ imitation crab meat (9-10oz)  12a- PB    Amount able to eat per sitting: ~16-20oz     Following 30/30/30 rule: yes    Food Intolerances/issues: none    Client Concerns: wt gain / increased appetite & cravings    Goals:   - Track intake [id number] tatiana LCMP (emailed 1500 tatiana LCMP)  - Track intake  - Reduce meat portion to 3-4oz & aim to add fruit or vegetable  - Use protein shake as needed to prevent over eating and extreme hunger between meals     *Pt may benefit from [facility] program.    Plan: f/u as directed    Francis Carranza RD, LD

## 2022-10-20 ENCOUNTER — OFFICE VISIT (OUTPATIENT)
Dept: BARIATRICS/WEIGHT MGMT | Age: 42
End: 2022-10-20
Payer: COMMERCIAL

## 2022-10-20 VITALS — HEIGHT: 74 IN | WEIGHT: 315 LBS | BODY MASS INDEX: 40.43 KG/M2

## 2022-10-20 DIAGNOSIS — Z98.84 STATUS POST LAPAROSCOPIC SLEEVE GASTRECTOMY: ICD-10-CM

## 2022-10-20 DIAGNOSIS — E66.01 MORBID OBESITY WITH BMI OF 40.0-44.9, ADULT (HCC): Primary | ICD-10-CM

## 2022-10-20 PROCEDURE — 99213 OFFICE O/P EST LOW 20 MIN: CPT | Performed by: SURGERY

## 2022-10-20 NOTE — PROGRESS NOTES
[facility]) Physicians   General & Laparoscopic Surgery  [employer] [facility] [employer]       HPI:     Gertrudis Kendall is a very pleasant 46 y.o. obese male , Body mass index is 44.55 kg/m². Harden Beech And multiple medical problems who is presenting for bariatric follow up care. Gertrudis Kendall is s/p laparoscopic sleeve gastrectomy by me   Comes today to the clinic without any complaints. Patient denies any nausea, vomiting, fevers, chills, shortness of breath, chest pain, constipation or urinary symptoms. Denies any heartburn nor dysphagia. Patient is feeling very well, and is very active. Working out quite a but, but also leading to more hunger      Past Medical History:   Diagnosis Date    Anxiety     Chronic GERD     Depression     Influenza A 03/27/2017    Kidney stones     Prostatitis     Rectal bleeding     intermittently    Ulcerative colitis ([address]     Unspecified sleep apnea     USES CPAP     Past Surgical History:   Procedure Laterality Date    ABDOMEN SURGERY Bilateral 1985    hernia repair    COLONOSCOPY  2009    tics, ulcerative colitis    COLONOSCOPY  03/2014    O'Vasyl-polyp x 2, diverticulosis    ENDOSCOPY, COLON, DIAGNOSTIC      SLEEVE GASTRECTOMY N/A 07/06/2021    [facility] [place]    UPPER GASTROINTESTINAL ENDOSCOPY N/A 09/14/2020    EGD BIOPSY performed by Mandy Ruvalcaba DO at [facility] ENDOSCOPY     Family History   Problem Relation Age of Onset    Cancer Mother     Asthma Mother     High Blood Pressure Father     Obesity Father     COPD Sister      Social History     Tobacco Use    Smoking status: Never    Smokeless tobacco: Never   Substance Use Topics    Alcohol use: Not Currently     Comment: rarely     I counseled the patient on the importance of not smoking and risks of ETOH. No Known Allergies  Vitals:    10/20/22 1215   Weight: (!) 347 lb (157.4 kg)   Height: 6' 2\" (1.88 m)       Body mass index is 44.55 kg/m².       Current Outpatient Medications:     fluticasone (FLONASE) 50 MCG/ACT nasal spray, 2 sprays by Nasal route 2 times daily, Disp: 16 g, Rfl: 5    citalopram (CELEXA) 40 MG tablet, TAKE 1 TABLET BY MOUTH EVERY DAY, Disp: 30 tablet, Rfl: 2    buPROPion (WELLBUTRIN XL) 300 MG extended release tablet, Take 1 tablet by mouth every morning, Disp: 90 tablet, Rfl: 3    tamsulosin (FLOMAX) 0.4 MG capsule, TAKE 1 CAPSULE BY MOUTH EVERY DAY, Disp: , Rfl:     Multiple Vitamin (MULTIVITAMIN, BARIATRIC FUSION COMPLETE, CHEW TAB), Take 4 tablets by mouth daily, Disp: , Rfl:       Review of Systems - History obtained from the patient  General ROS: negative  Psychological ROS: negative  Hematological and Lymphatic ROS: negative  Endocrine ROS: negative  Respiratory ROS: negative  Cardiovascular ROS: negative  Gastrointestinal ROS:negative  Genito-Urinary ROS: negative  Musculoskeletal ROS: negative   Skin ROS: negative    Physical Exam   Vitals Reviewed   Constitutional: Patient is oriented to person, place, and time. Patient appears well-developed and well-nourished. Patient is active and cooperative. Non-toxic appearance. No distress. Neck: Trachea normal and normal range of motion. No JVD present. Pulmonary/Chest: Effort normal. No accessory muscle usage or stridor. No apnea. No respiratory distress. Cardiovascular: Normal rate and no JVD. Abdominal: Normal appearance. Patient exhibits no distension. Abdomen is soft, obese, non tender. Musculoskeletal: Normal range of motion. Patient exhibits no edema. Neurological: Patient is alert and oriented to person, place, and time. Patient has normal strength. GCS eye subscore is 4. GCS verbal subscore is 5. GCS motor subscore is 6. Skin: Skin is warm and dry. No abrasion and no rash noted. Patient is not diaphoretic. No cyanosis or erythema. Psychiatric: Patient has a normal mood and affect. Speech is normal and behavior is normal. Cognition and memory are normal.         A/P     Morris Olea is 46 y.o. male , now with Body mass index is 44.55 kg/m².   s/p Sleeve gastrectomy, has lost gained 7 lbs since last visit. The patient underwent dietary counseling with registered dietician. I have reviewed, discussed and agree with the dietary plan. Patient is trying hard to keep good dietary and behavior modifications. Patient is monitoring portion sizes, food choices and liquid calories. Patient is trying to exercise regularly. Patient pleased with the surgery outcomes. We discussed how his weight affects his overall health including:  Son Daniels was seen today for bariatrics post op follow up. Diagnoses and all orders for this visit:    Morbid obesity with BMI of 40.0-44.9, adult ([facility] [address]    Status post laparoscopic sleeve gastrectomy       and importance of weight loss to alleviate those co morbid conditions. I encouraged the patient to continue exercise and keeping healthy eating habits. Also counseled the patient extensively on post surgery care. Total encounter time:  20 minutes including any number of the following: review of labs, imaging, provider notes, outside hospital records; performing examination/evaluation; counseling patient and family; ordering medications/tests; placing referrals and communication with referring physicians; coordination of care, and documentation in the EHR. RTC in 9 months  Diet and Exercise   Referred to [facility]     Patient advised that its their responsibility to follow up for studies and/or labs ordered today.

## 2023-01-04 NOTE — PROGRESS NOTES
Dietary Assessment Note      Vitals: There were no vitals filed for this visit. This eval was conducted via phone on 1/4/23 in preparation for their visit on 1/12/23 with Dr. Arabella Cox. Patient gained 13 lbs over past 2.5 months based off pt stated wt of 360 lbs    Total Weight Loss: 88.4 lbs    Labs reviewed: No new nutrition related labs     Protein intake: >80 grams/day     Fluid intake: 90 oz per day     Multivitamin/mineral intake: 2 centrum     Calcium intake:  2 citracal max plus     Other: Reports he is getting weekly Iron infusions     Exercise: HP 3-4 x week for 1-2 hours - Mostly walking / Mardel Cyrus / strength training (doing more of)     Nutrition Assessment: 1 year 5 mo s/p sleeve post-op visit. Pt reports weight gain and has been struggling the last 3-4 months. About 3-4 months ago states \"things got derailed\" as he started taking multiple rounds of steroids for a sinus problem. When he started on the steroids it has been increasing his appetite, finds himself eating about every 2 hours. Gets another steroid injection Friday and reports they will be re-evaluating the need for more in the next 2 weeks. Reports he has been eating a lot of leftovers from the holidays and that family has been sending home tempting foods such as pies. Pt discouraged by weight gain and would like to get some weight back off. Pt also having difficulty sleeping so finds himself eating at midnight but is physically hungry at this time).      Breakfast: Nectar protein shake made with skim milk OR NV protein bar     Snack: might have another protein bar OR spoonful of PB     Snack: sometimes - might have another protein bar OR spoonful of PB     Lunch: Nectar protein shake made with skim milk OR leftovers (turkey with mashed potatoes and broccoli/carrots OR cereal (captain crunch)     Snack: might have another protein bar OR spoonful of PB     Snack: might have another protein bar OR spoonful of PB     Dinner: larger portions - frozen green giant veggies lightly sauced with some imitation crab meat with parmesan cheese OR small steak OR chili     Snack - sometimes - another protein bar     Snack: imitation crab with light cheese and crumbled dinner roll     Fluids: water     Amount able to eat per sittin-7 oz protein and 1 cup of veggie     Following  rule: Yes - eating over an hour as he works when he is eating - sips as needed     Food Intolerances/issues: None     Client Concerns: off track     Goals:   Start 1200 kcal post op meal plan - reviewed and emailed to pt   Limit eating occasions to 6 x day   Discussed focusing on protein and produce with all meals/snacks to help with satiety  Discussed limiting portions to 4 oz protein per sitting and 1/2-1 cup of veggie   Discussed getting rid of holiday food and avoiding less filling/high sugary options such as cereal   Reduce strength training to what he was doing before     Plan: F/U per Provider     Melani Moya RD, LD

## 2023-01-12 ENCOUNTER — OFFICE VISIT (OUTPATIENT)
Dept: BARIATRICS/WEIGHT MGMT | Age: 43
End: 2023-01-12
Payer: COMMERCIAL

## 2023-01-12 VITALS — HEIGHT: 74 IN | WEIGHT: 315 LBS | BODY MASS INDEX: 40.43 KG/M2

## 2023-01-12 DIAGNOSIS — Z98.84 STATUS POST LAPAROSCOPIC SLEEVE GASTRECTOMY: ICD-10-CM

## 2023-01-12 DIAGNOSIS — E66.01 MORBID OBESITY WITH BMI OF 40.0-44.9, ADULT (HCC): Primary | ICD-10-CM

## 2023-01-12 PROCEDURE — 99213 OFFICE O/P EST LOW 20 MIN: CPT | Performed by: SURGERY

## 2023-01-12 NOTE — PROGRESS NOTES
[facility]) Physicians   General & Laparoscopic Surgery  [employer] [facility] [employer]       HPI:     Alan Zambrano is a very pleasant 38 y.o. obese male , Body mass index is 46.99 kg/m². Laurel [place] And multiple medical problems who is presenting for bariatric follow up care. Alan Zambrano is s/p laparoscopic sleeve gastrectomy by me   Comes today to the clinic without any complaints. Patient denies any nausea, vomiting, fevers, chills, shortness of breath, chest pain, constipation or urinary symptoms. Denies any heartburn nor dysphagia. Overall doing well  Got off track after steroids and trying hard to stay on track while battling effects of steroids      Past Medical History:   Diagnosis Date    Anemia     Anxiety     Chronic GERD     Depression     Influenza A 03/27/2017    Kidney stones     Prostatitis     Rectal bleeding     intermittently    Ulcerative colitis ([address]     Unspecified sleep apnea     USES CPAP     Past Surgical History:   Procedure Laterality Date    ABDOMEN SURGERY Bilateral 1985    hernia repair    COLONOSCOPY  2009    tics, ulcerative colitis    COLONOSCOPY  03/2014    O'Vasyl-polyp x 2, diverticulosis    ENDOSCOPY, COLON, DIAGNOSTIC      SLEEVE GASTRECTOMY N/A 07/06/2021    [facility]ano Smoker    UPPER GASTROINTESTINAL ENDOSCOPY N/A 09/14/2020    EGD BIOPSY performed by Leonie Avila DO at [facility] ENDOSCOPY     Family History   Problem Relation Age of Onset    Cancer Mother     Asthma Mother     High Blood Pressure Father     Obesity Father     COPD Sister      Social History     Tobacco Use    Smoking status: Never    Smokeless tobacco: Never   Substance Use Topics    Alcohol use: Not Currently     Comment: rarely     I counseled the patient on the importance of not smoking and risks of ETOH. No Known Allergies  Vitals:    01/12/23 0836   Weight: (!) 366 lb (166 kg)   Height: 6' 2\" (1.88 m)       Body mass index is 46.99 kg/m².       Current Outpatient Medications:     citalopram (CELEXA) 40 MG tablet, TAKE 1 TABLET BY MOUTH EVERY DAY, Disp: 30 tablet, Rfl: 5    buPROPion (WELLBUTRIN XL) 300 MG extended release tablet, TAKE 1 TABLET EVERY MORNING, Disp: 30 tablet, Rfl: 0    fluticasone (FLONASE) 50 MCG/ACT nasal spray, 2 sprays by Nasal route 2 times daily, Disp: 16 g, Rfl: 5    tamsulosin (FLOMAX) 0.4 MG capsule, TAKE 1 CAPSULE BY MOUTH EVERY DAY, Disp: , Rfl:     Multiple Vitamin (MULTIVITAMIN, BARIATRIC FUSION COMPLETE, CHEW TAB), Take 4 tablets by mouth daily, Disp: , Rfl:       Review of Systems - History obtained from the patient  General ROS: negative  Psychological ROS: negative  Hematological and Lymphatic ROS: negative  Endocrine ROS: negative  Respiratory ROS: negative  Cardiovascular ROS: negative  Gastrointestinal ROS:negative  Genito-Urinary ROS: negative  Musculoskeletal ROS: negative   Skin ROS: negative    Physical Exam   Vitals Reviewed   Constitutional: Patient is oriented to person, place, and time. Patient appears well-developed and well-nourished. Patient is active and cooperative. Non-toxic appearance. No distress. Neck: Trachea normal and normal range of motion. No JVD present. Pulmonary/Chest: Effort normal. No accessory muscle usage or stridor. No apnea. No respiratory distress. Cardiovascular: Normal rate and no JVD. Abdominal: Normal appearance. Patient exhibits no distension. Abdomen is soft, obese, non tender. Musculoskeletal: Normal range of motion. Patient exhibits no edema. Neurological: Patient is alert and oriented to person, place, and time. Patient has normal strength. GCS eye subscore is 4. GCS verbal subscore is 5. GCS motor subscore is 6. Skin: Skin is warm and dry. No abrasion and no rash noted. Patient is not diaphoretic. No cyanosis or erythema. Psychiatric: Patient has a normal mood and affect. Speech is normal and behavior is normal. Cognition and memory are normal.         A/P     Alan Zambrano is 38 y.o. male , now with Body mass index is 46.99 kg/m².   s/p Sleeve gastrectomy. The patient underwent dietary counseling with registered dietician. I have reviewed, discussed and agree with the dietary plan. Patient is trying hard to keep good dietary and behavior modifications. Patient is monitoring portion sizes, food choices and liquid calories. Patient is trying to exercise regularly. Patient pleased with the surgery outcomes. We discussed how his weight affects his overall health including:  Carl Coraod was seen today for bariatrics post op follow up. Diagnoses and all orders for this visit:    Morbid obesity with BMI of 40.0-44.9, adult ([facility] [address]    Status post laparoscopic sleeve gastrectomy       and importance of weight loss to alleviate those co morbid conditions. I encouraged the patient to continue exercise and keeping healthy eating habits. Also counseled the patient extensively on post surgery care. RTC in 7 months  Diet and Exercise   Referred to [facility]     Patient advised that its their responsibility to follow up for studies and/or labs ordered today.

## 2023-01-27 ENCOUNTER — TELEMEDICINE (OUTPATIENT)
Dept: BARIATRICS/WEIGHT MGMT | Age: 43
End: 2023-01-27
Payer: COMMERCIAL

## 2023-01-27 DIAGNOSIS — E66.01 MORBID OBESITY WITH BMI OF 40.0-44.9, ADULT (HCC): Primary | ICD-10-CM

## 2023-01-27 PROCEDURE — 96156 HLTH BHV ASSMT/REASSESSMENT: CPT | Performed by: SOCIAL WORKER

## 2023-01-27 NOTE — PROGRESS NOTES
Johnnie Maki is a 38 y.o. male who presents today for individual counseling encounter / psychosocial assessment related to behavioral health. Johnnie Maki is presented oriented and engaged throughout assessment. Patient appeared with appropriate insight and cognition. Patient is referred to therapist by his surgeon Dr. Kemar Ray. Patient meets criteria for Morbid obesity with BMI of 40.0-44.9    Presenting Problem:   Per patient \"I went for the sleeve because my weight was just getting out of control. I was about 440 pounds. I just really needed help to lose the weight. It's been going really good. Over the last few months I've been diagnosed anemic and has been prescribed steroids, with that the weight has started to come back. I've put about 30 pounds back on. \"    Home life / Family relationships / Supports:   Patient takes care of his father since the passing of his mother. Patient's father has significant health concerns. Hobbies/Positives:   Patient enjoys building model kits and playing video games. Employment hx  Patient is a  full-time, working 3 or 4 twelve hour shifts. Psychiatric hx  Per patient \"severe depression and anxiety. I'm on medication and it helps a lot. \" Medication is managed by his PCP. Hx of emotional/stress/bored eating:   Patient denies    Daily Health Concerns/Limitations  Per patient \"Joint pain, but I think that's just weight issues. \"    Substance Use:  None reported    Current needs / Limitations   Per patient \"to take better care of my dad and take care of the house more. What gets in the way is my appetite. \"    Additional Questions/Concerns per patient  Patient had questions regarding engaging with Dr. Maynor Rogers for medication purposes. Behaviorist overview:   Therapist utilized active listening and motivational interviewing skills to assess patient need. Patient is motivated to \"get back on track\" with his weight loss.  Patient recently ended a round of steroids but is still feeling the hunger effects from taking them. Patient asked appropriate questions. No goals are being set at this time. Patient has a scheduled appointment with Dr. Kortney Aguirre to discuss medication. Therapist will see patient after he has been on weight management medication for four weeks. Goals    None        El Hemphilloumou, was evaluated through a synchronous (real-time) audio-video encounter. The patient (or guardian if applicable) is aware that this is a billable service, which includes applicable co-pays. This Virtual Visit was conducted with patient's (and/or legal guardian's) consent. The visit was conducted pursuant to the emergency declaration under the [facility], [facility] authority and the [employer] and [employer] General Act. Patient identification was verified, and a caregiver was present when appropriate. The patient was located ain his home. Provider was located at a facility office. Patient and therapist spent majority of session discussing above goals and ways to achieve success with each goal. Follow up care has been discussed with patient in relation to goals and concerns addressed today. Same day cancellation/no show policy was discussed with patient per behaviorist guidelines. 28 minutes was spent in assessment and direct counseling with patient.      CHELSI Gordillo

## 2023-02-22 ENCOUNTER — TELEMEDICINE (OUTPATIENT)
Dept: BARIATRICS/WEIGHT MGMT | Age: 43
End: 2023-02-22
Payer: COMMERCIAL

## 2023-02-22 DIAGNOSIS — E66.01 MORBID OBESITY WITH BMI OF 45.0-49.9, ADULT (HCC): Primary | ICD-10-CM

## 2023-02-22 DIAGNOSIS — Z98.84 S/P LAPAROSCOPIC SLEEVE GASTRECTOMY: ICD-10-CM

## 2023-02-22 DIAGNOSIS — R73.03 PREDIABETES: ICD-10-CM

## 2023-02-22 DIAGNOSIS — Z71.3 DIETARY COUNSELING AND SURVEILLANCE: ICD-10-CM

## 2023-02-22 PROCEDURE — 99214 OFFICE O/P EST MOD 30 MIN: CPT | Performed by: FAMILY MEDICINE

## 2023-02-22 ASSESSMENT — ENCOUNTER SYMPTOMS
CHEST TIGHTNESS: 0
EYE PAIN: 0
CHOKING: 0
VOMITING: 0
SHORTNESS OF BREATH: 0
PHOTOPHOBIA: 0
COUGH: 0
WHEEZING: 0
ABDOMINAL PAIN: 0
CONSTIPATION: 0
DIARRHEA: 0
APNEA: 0
ABDOMINAL DISTENTION: 0
BLOOD IN STOOL: 0
NAUSEA: 0

## 2023-02-22 NOTE — PROGRESS NOTES
Patient: Adina Conner     Encounter Date: 2/22/2023    YOB: 1980               Age: 38 y.o. Patient identification was verified at the start of the visit. Patient-Reported Vitals 2/22/2023   Patient-Reported Weight 365   Patient-Reported Height 6'2''         BP Readings from Last 1 Encounters:   01/20/23 118/70       BMI Readings from Last 1 Encounters:   01/20/23 47.51 kg/m²       Pulse Readings from Last 1 Encounters:   01/20/23 93                                             Wt Readings from Last 3 Encounters:   01/20/23 (!) 370 lb (167.8 kg)   01/12/23 (!) 366 lb (166 kg)   10/20/22 (!) 347 lb (157.4 kg)        Chief Complaint   Patient presents with    Bariatric, Initial Visit     MWKIMO- NP        HPI:    38 y.o. male presents to establish care via video visit. The patient's medical history is significant for class III obesity s/p sleeve gastrectomy by Dr. Chad Denton. The patient has a long-standing history of obesity which started gradually. The problem is severe. The patient has been gaining weight. Risk factors include annual weight gain of >2 lbs (1 kg)/ year and sedentary lifestyle. Aggravating factors include poor diet and lack of physical activity. The patient has tried various diet/exercise plans which have been ineffective in the long-run. he is motivated to start losing weight to help improve his health. Interested in aom to help with appetite regulation/cravings. Initial presurgical weight: ~440 ponds   Lowest weight s/p sleeve: 320 pounds (8 pounds)       Triggers for weight gain? [x] Stress   [] Illness   [x] Medications- steroids  [] Travel  []Injury     [] Nightshift work   [] Insomnia  [] No specific triggers   [] Other    Food triggers:   [x] Stress   [x] Boredom   [] Fast food   [] Eating out   [] Seeking reward   [] Social     Have you ever taken prescription medications to help you lose weight?    [] Yes  [x] No    Have you ever been on a meal replacement program?  [] Yes  [x] No    How many hours of sleep do you get each night?  [] <6 hours  [x] 6-8 hours  [] >8 hours    Do you have sleep apnea? [x] Yes  [] No   [] Unknown       The patient denies any significant cardiac or psychiatric disease. The patient denies a history thyroid disease. The patient denies a history of glaucoma. The patient denies a history of seizure disorders/epiliepsy. +H/o kidney stones     Dietitian's assessment reviewed and addressed with patient. Reviewed:  [x] Nutrition and the importance of regular protein intake  [x] Hidden CHO/carbohydrate sources  [x] Alcohol use  [x] Tobacco use  [x] Drug use- Denies   [x] Importance of exercise and reducing sedentary time        Controlled Substance Monitoring:     No flowsheet data found.      No Known Allergies      Current Outpatient Medications:     citalopram (CELEXA) 40 MG tablet, TAKE 1 TABLET BY MOUTH EVERY DAY, Disp: 30 tablet, Rfl: 5    buPROPion (WELLBUTRIN XL) 300 MG extended release tablet, TAKE 1 TABLET EVERY MORNING, Disp: 30 tablet, Rfl: 0    fluticasone (FLONASE) 50 MCG/ACT nasal spray, 2 sprays by Nasal route 2 times daily, Disp: 16 g, Rfl: 5    tamsulosin (FLOMAX) 0.4 MG capsule, TAKE 1 CAPSULE BY MOUTH EVERY DAY, Disp: , Rfl:     Multiple Vitamin (MULTIVITAMIN, BARIATRIC FUSION COMPLETE, CHEW TAB), Take 4 tablets by mouth daily, Disp: , Rfl:     Patient Active Problem List   Diagnosis    Morbid obesity with BMI of 50.0-59.9, adult (HCC)    Anxiety    LAUREN (obstructive sleep apnea)    Neutrophilic leukocytosis    Pain of left leg    Subcutaneous nodule of left lower extremity    Lipoma of abdominal wall    Prediabetes    Vitamin D deficiency       Past Medical History:   Diagnosis Date    Anemia     Anxiety     Chronic GERD     Depression     Influenza A 03/27/2017    Kidney stones     Prostatitis     Rectal bleeding     intermittently    Ulcerative colitis (HCC)     Unspecified sleep apnea     USES CPAP       Past Surgical History:   Procedure Laterality Date    ABDOMEN SURGERY Bilateral 1985    hernia repair    COLONOSCOPY  2009    tics, ulcerative colitis    COLONOSCOPY  03/2014    O'Brown-polyp x 2, diverticulosis    SLEEVE GASTRECTOMY N/A 07/06/2021    [facility]    UPPER GASTROINTESTINAL ENDOSCOPY N/A 09/14/2020    EGD BIOPSY performed by Mandy Ruvalcaba DO at [facility]       Family History   Problem Relation Age of Onset    Cancer Mother     Asthma Mother     High Blood Pressure Father     Obesity Father     COPD Sister        Review of Systems   Constitutional:  Negative for fatigue. Eyes:  Negative for photophobia, pain and visual disturbance. Respiratory:  Negative for apnea, cough, choking, chest tightness, shortness of breath and wheezing. Cardiovascular:  Negative for chest pain, palpitations and leg swelling. Gastrointestinal:  Negative for abdominal distention, abdominal pain, blood in stool, constipation, diarrhea, nausea and vomiting. Endocrine: Negative for cold intolerance and heat intolerance. Musculoskeletal:  Negative for arthralgias and myalgias. Skin:  Negative for rash. Neurological:  Negative for dizziness, tremors, syncope, weakness, numbness and headaches. Psychiatric/Behavioral:  Negative for agitation, confusion, decreased concentration, dysphoric mood, hallucinations, sleep disturbance and suicidal ideas. The patient is not nervous/anxious and is not hyperactive. Physical Exam  Constitutional:       Appearance: He is well-developed. HENT:      Head: Normocephalic. Eyes:      Conjunctiva/sclera: Conjunctivae normal.   Abdominal:      General: Abdomen is protuberant. Musculoskeletal:         General: No swelling. Neurological:      Mental Status: He is alert and oriented to person, place, and time. Psychiatric:         Mood and Affect: Mood normal.         Behavior: Behavior normal.         Thought Content:  Thought content normal.         Judgment: Judgment normal. Orders Only on 02/03/2023   Component Date Value Ref Range Status    Hemoglobin A1C 02/03/2023 5.7  See comment % Final    Comment: Comment:  Diagnosis of Diabetes: > or = 6.5%  Increased risk of diabetes (Prediabetes): 5.7-6.4%  Glycemic Control: Nonpregnant Adults: <7.0%                    Pregnant: <6.0%        eAG 02/03/2023 116.9  mg/dL Final    Varicella-Zoster Virus Ab, Igg 02/03/2023 NON IMMUNE   Final    Testing performed by multiplex bead immunoassay using the [employer]. TSH 02/03/2023 1.19  0.27 - 4.20 uIU/mL Final    Hep C Ab Interp 02/03/2023 Non-reactive  Non-reactive Final    Cholesterol, Total 02/03/2023 171  0 - 199 mg/dL Final    Triglycerides 02/03/2023 84  0 - 150 mg/dL Final    HDL 02/03/2023 50  40 - 60 mg/dL Final    Comment: An HDL cholesterol less than 40 mg/dL is low and  constitutes a coronary heart disease risk factor. An HDL cholesterol greater than 60 mg/dL is a  negative risk factor for coronary heart disease. LDL Calculated 02/03/2023 104 (A)  <100 mg/dL Final    VLDL Cholesterol Calculated 02/03/2023 17  Not Established mg/dL Final    Sodium 02/03/2023 142  136 - 145 mmol/L Final    Potassium 02/03/2023 4.4  3.5 - 5.1 mmol/L Final    Chloride 02/03/2023 103  99 - 110 mmol/L Final    CO2 02/03/2023 26  21 - 32 mmol/L Final    Anion Gap 02/03/2023 13  3 - 16 Final    Glucose 02/03/2023 84  70 - 99 mg/dL Final    BUN 02/03/2023 11  7 - 20 mg/dL Final    Creatinine 02/03/2023 0.6 (A)  0.9 - 1.3 mg/dL Final    Est, Glom Filt Rate 02/03/2023 >60  >60 Final    Comment: Pediatric calculator link  Zita.at. org/professionals/kdoqi/gfr_calculatorped  Effective Oct 3, 2022  These results are not intended for use in patients  <25years of age. eGFR results are calculated without  a race factor using the 2021 CKD-EPI equation. Careful  clinical correlation is recommended, particularly when  comparing to results calculated using previous equations.   The CKD-EPI equation is less accurate in patients with  extremes of muscle mass, extra-renal metabolism of  creatinine, excessive creatinine ingestion, or following  therapy that affects renal tubular secretion. Calcium 02/03/2023 9.5  8.3 - 10.6 mg/dL Final    Total Protein 02/03/2023 7.1  6.4 - 8.2 g/dL Final    Albumin 02/03/2023 4.2  3.4 - 5.0 g/dL Final    Albumin/Globulin Ratio 02/03/2023 1.4  1.1 - 2.2 Final    Total Bilirubin 02/03/2023 0.4  0.0 - 1.0 mg/dL Final    Alkaline Phosphatase 02/03/2023 87  40 - 129 U/L Final    ALT 02/03/2023 27  10 - 40 U/L Final    AST 02/03/2023 21  15 - 37 U/L Final         Assessment and Plan:    1. Morbid obesity with BMI of 45.0-49.9, adult ([address]  Heavily counseled on the importance of therapeutic lifestyle changes through diet and exercise. The patient understands that the goal of treatment is to reach and stay at a healthy weight. The initial treatment goal is to lose at least 5-10% of his body weight in 12 weeks. This will require changes in eating habits, increased physical activity, and behavior changes. Counseled on low carb/tatiana diet. Patient handouts and education material provided and reviewed in detail with the patient. All questions answered. Encouraged patient to keep a food journal and to bring it to his next visit. Discussed available treatment options in addition to lifestyle changes including medications or OPTIFAST. He is interested in anti-obesity medications. Erica or Robert Davenport may be recommended at the next visit depending on his progress and lab results. Not a candidate for Qsymia due to h/o kidney stones. Explained that medications/meal replacements are most effective as part of a comprehensive treatment plan that includes proper nutrition, physical activity, and behavior modification. The patient understands that he will need close follow-ups every 2-4 weeks if he starts treatment. Follow-up in 2 weeks to discuss lab results and treatment plan.  Patient advised that it is his responsibility to follow up on any ordered tests/lab results. Patient understands and agrees with the plan. - Vitamin B12 & Folate; Future  - Vitamin D 25 Hydroxy; Future    2. Dietary counseling and surveillance  Start 1500-Irwin/low carb meal plan. Use distraction techniques to avoid boredom/stress eating. Plan/prep meals ahead of time. Avoid soda/juice/sugary drinks. Be mindful of portion sizes. 3. Prediabetes  Encouraged low carb/irwin diet and exercise. Recommend repeat labs in 6 months. 4. S/P laparoscopic sleeve gastrectomy  Avoid all carbonated drinks. Choose fluids that are sugar-free. Eat small, but frequent meals including a protein source with each meal. Eat meals over 30 minutes, taking small bites and chewing well. Take MVIs as directed. Nutrition:  [] LCHF/Ketogenic [x] Low carb/low-calorie diet [] Low-calorie diet     []Maintenance        FITTE:   [x] Cardio [] Resistance/stength exercises   [x] ACSM recommendations (150 minutes/week)           Behavior:   [x] Motivational interviewing performed    [] Referral for counseling  [x] Discussed strategies to overcome habits/challenges for focus      [x] Stress management   [x] Stimulus control  [x] Sleep hygiene      Orders Placed This Encounter   Procedures    Vitamin B12 & Folate     Standing Status:   Future     Standing Expiration Date:   2/22/2024    Vitamin D 25 Hydroxy     Standing Status:   Future     Standing Expiration Date:   2/22/2024         No follow-ups on file. Ursula Velasco is a 38 y.o. male being evaluated by a Virtual Visit (video visit) encounter to address concerns as mentioned above. A caregiver was present when appropriate. Due to this being a TeleHealth encounter (During [facility]- public health emergency), evaluation of the following organ systems was limited: Vitals/Constitutional/EENT/Resp/CV/GI//MS/Neuro/Skin/Heme-Lymph-Imm.   Pursuant to the emergency declaration under the [facility], [facility] waiver authority and the [employer] and [employer] Act, this Virtual Visit was conducted with patient's (and/or legal guardian's) consent, to reduce the patient's risk of exposure to COVID-19 and provide necessary medical care. The patient (and/or legal guardian) has also been advised to contact this office for worsening conditions or problems, and seek emergency medical treatment and/or call 911 if deemed necessary. Services were provided through a video synchronous discussion virtually to substitute for in-person clinic visit. Patient and provider were located at their individual homes. An electronic signature was used to authenticate this note. Greater than 50% of this 45 minute visit was used for  -Preparing to see the patient such as reviewing the pt records   Obtaining and/or reviewing separately obtained history   Performing a medically appropriate history    Counseling and educating the patient, family, and/or caregiver   Ordering prescirption medications, tests, or procedures   Documenting clinical information in the electronic or other health record    Michelle Ross, was evaluated through a synchronous (real-time) audio-video encounter. The patient (or guardian if applicable) is aware that this is a billable service, which includes applicable co-pays. This Virtual Visit was conducted with patient's (and/or legal guardian's) consent. The visit was conducted pursuant to the emergency declaration under the [address] [place] [address], [address] [place] [address] waiver authority and the [employer] and [employer] Act. Patient identification was verified, and a caregiver was present when appropriate.    The patient was located at Home: [address]  Provider was located at Home ([address]): [place]         Total time spent for this encounter: Not billed by time    --Estephania Glaser MD on 2/22/2023 at 2:29 PM    An electronic signature was used to authenticate this note.

## 2023-03-16 ENCOUNTER — TELEMEDICINE (OUTPATIENT)
Dept: BARIATRICS/WEIGHT MGMT | Age: 43
End: 2023-03-16
Payer: COMMERCIAL

## 2023-03-16 VITALS — HEIGHT: 74 IN | WEIGHT: 315 LBS | BODY MASS INDEX: 40.43 KG/M2

## 2023-03-16 DIAGNOSIS — E66.01 MORBID OBESITY WITH BMI OF 45.0-49.9, ADULT (HCC): ICD-10-CM

## 2023-03-16 DIAGNOSIS — Z71.3 DIETARY COUNSELING AND SURVEILLANCE: ICD-10-CM

## 2023-03-16 DIAGNOSIS — Z98.84 S/P LAPAROSCOPIC SLEEVE GASTRECTOMY: ICD-10-CM

## 2023-03-16 DIAGNOSIS — E66.01 MORBID OBESITY WITH BMI OF 45.0-49.9, ADULT (HCC): Primary | ICD-10-CM

## 2023-03-16 LAB
25(OH)D3 SERPL-MCNC: 32.7 NG/ML
FOLATE SERPL-MCNC: 13.63 NG/ML (ref 4.78–24.2)
VIT B12 SERPL-MCNC: 904 PG/ML (ref 211–911)

## 2023-03-16 PROCEDURE — 99214 OFFICE O/P EST MOD 30 MIN: CPT | Performed by: FAMILY MEDICINE

## 2023-03-16 RX ORDER — LIRAGLUTIDE 6 MG/ML
INJECTION, SOLUTION SUBCUTANEOUS
Qty: 5 ADJUSTABLE DOSE PRE-FILLED PEN SYRINGE | Refills: 0 | Status: SHIPPED | OUTPATIENT
Start: 2023-03-16

## 2023-03-16 ASSESSMENT — ENCOUNTER SYMPTOMS
WHEEZING: 0
COUGH: 0
ABDOMINAL DISTENTION: 0
BLOOD IN STOOL: 0
SHORTNESS OF BREATH: 0
PHOTOPHOBIA: 0
VOMITING: 0
CONSTIPATION: 0
CHEST TIGHTNESS: 0
EYE PAIN: 0
DIARRHEA: 0
NAUSEA: 0
ABDOMINAL PAIN: 0
APNEA: 0
CHOKING: 0

## 2023-03-16 NOTE — PROGRESS NOTES
Patient: Zack Solano                      Encounter Date: 3/16/2023    YOB: 1980                Age: 38 y.o. Chief Complaint   Patient presents with    Weight Management     F/u MWM         Patient identification was verified at the start of the visit. Patient-Reported Vitals 3/16/2023   Patient-Reported Weight 361   Patient-Reported Height 6'2''         BP Readings from Last 1 Encounters:   01/20/23 118/70       BMI Readings from Last 1 Encounters:   01/20/23 47.51 kg/m²       Pulse Readings from Last 1 Encounters:   01/20/23 93          Wt Readings from Last 3 Encounters:   01/20/23 (!) 370 lb (167.8 kg)   01/12/23 (!) 366 lb (166 kg)   10/20/22 (!) 347 lb (157.4 kg)        HPI: 38 y.o. male with a long-standing history of obesity s/p sleeve gastrectomy by Dr. Denny Sun presents today for virtual video follow-up. he has lost 4 pounds since his last visit. Current treatment includes low carb/tatiana diet. Tolerating it well. Making better dietary choices. Motivated to continue losing weight. Interested in aom to help with appetite regulation.          Initial presurgical weight: ~440 ponds   Lowest weight s/p sleeve: 320 pounds         +H/o kidney stones       No Known Allergies      Current Outpatient Medications:     liraglutide-weight management (SAXENDA) 18 MG/3ML SOPN, Week 1       0.6 mg sc once daily Week 2      1.2 mg sc once daily Weeks 3    1.8 mg sc once daily Week 4      2.4 mg sc once daily Week 5       3 mg   sc once daily, Disp: 5 Adjustable Dose Pre-filled Pen Syringe, Rfl: 0    Insulin Pen Needle 32G X 4 MM MISC, 1 each by Does not apply route daily, Disp: 100 each, Rfl: 3    citalopram (CELEXA) 40 MG tablet, TAKE 1 TABLET BY MOUTH EVERY DAY, Disp: 30 tablet, Rfl: 5    buPROPion (WELLBUTRIN XL) 300 MG extended release tablet, TAKE 1 TABLET EVERY MORNING, Disp: 30 tablet, Rfl: 0    fluticasone (FLONASE) 50 MCG/ACT nasal spray, 2 sprays by Nasal route 2 times daily, Disp: 16 g, Rfl: 5    tamsulosin (FLOMAX) 0.4 MG capsule, TAKE 1 CAPSULE BY MOUTH EVERY DAY, Disp: , Rfl:     Multiple Vitamin (MULTIVITAMIN, BARIATRIC FUSION COMPLETE, CHEW TAB), Take 4 tablets by mouth daily, Disp: , Rfl:     Patient Active Problem List   Diagnosis    Morbid obesity with BMI of 50.0-59.9, adult (HCC)    Anxiety    LAUREN (obstructive sleep apnea)    Neutrophilic leukocytosis    Pain of left leg    Subcutaneous nodule of left lower extremity    Lipoma of abdominal wall    Prediabetes    Vitamin D deficiency       Review of Systems   Constitutional:  Negative for fatigue. Eyes:  Negative for photophobia, pain and visual disturbance. Respiratory:  Negative for apnea, cough, choking, chest tightness, shortness of breath and wheezing. Cardiovascular:  Negative for chest pain, palpitations and leg swelling. Gastrointestinal:  Negative for abdominal distention, abdominal pain, blood in stool, constipation, diarrhea, nausea and vomiting. Endocrine: Negative for cold intolerance and heat intolerance. Musculoskeletal:  Negative for arthralgias and myalgias. Skin:  Negative for rash. Neurological:  Negative for dizziness, tremors, syncope, weakness, numbness and headaches. Psychiatric/Behavioral:  Negative for agitation, confusion, decreased concentration, dysphoric mood, hallucinations, sleep disturbance and suicidal ideas. The patient is not nervous/anxious and is not hyperactive. Physical Exam  Constitutional:       Appearance: He is well-developed. HENT:      Head: Normocephalic. Eyes:      Conjunctiva/sclera: Conjunctivae normal.   Abdominal:      General: Abdomen is protuberant. Musculoskeletal:         General: No swelling. Neurological:      Mental Status: He is alert and oriented to person, place, and time. Psychiatric:         Mood and Affect: Mood normal.         Behavior: Behavior normal.         Thought Content:  Thought content normal.         Judgment: Judgment normal. Orders Only on 02/03/2023   Component Date Value Ref Range Status    Hemoglobin A1C 02/03/2023 5.7  See comment % Final    Comment: Comment:  Diagnosis of Diabetes: > or = 6.5%  Increased risk of diabetes (Prediabetes): 5.7-6.4%  Glycemic Control: Nonpregnant Adults: <7.0%                    Pregnant: <6.0%        eAG 02/03/2023 116.9  mg/dL Final    Varicella-Zoster Virus Ab, Igg 02/03/2023 NON IMMUNE   Final    Testing performed by multiplex bead immunoassay using the LinguaNext0. TSH 02/03/2023 1.19  0.27 - 4.20 uIU/mL Final    Hep C Ab Interp 02/03/2023 Non-reactive  Non-reactive Final    Cholesterol, Total 02/03/2023 171  0 - 199 mg/dL Final    Triglycerides 02/03/2023 84  0 - 150 mg/dL Final    HDL 02/03/2023 50  40 - 60 mg/dL Final    Comment: An HDL cholesterol less than 40 mg/dL is low and  constitutes a coronary heart disease risk factor. An HDL cholesterol greater than 60 mg/dL is a  negative risk factor for coronary heart disease. LDL Calculated 02/03/2023 104 (A)  <100 mg/dL Final    VLDL Cholesterol Calculated 02/03/2023 17  Not Established mg/dL Final    Sodium 02/03/2023 142  136 - 145 mmol/L Final    Potassium 02/03/2023 4.4  3.5 - 5.1 mmol/L Final    Chloride 02/03/2023 103  99 - 110 mmol/L Final    CO2 02/03/2023 26  21 - 32 mmol/L Final    Anion Gap 02/03/2023 13  3 - 16 Final    Glucose 02/03/2023 84  70 - 99 mg/dL Final    BUN 02/03/2023 11  7 - 20 mg/dL Final    Creatinine 02/03/2023 0.6 (A)  0.9 - 1.3 mg/dL Final    Est, Glom Filt Rate 02/03/2023 >60  >60 Final    Comment: Pediatric calculator link  Zita.at. org/professionals/kdoqi/gfr_calculatorped  Effective Oct 3, 2022  These results are not intended for use in patients  <25years of age. eGFR results are calculated without  a race factor using the 2021 CKD-EPI equation. Careful  clinical correlation is recommended, particularly when  comparing to results calculated using previous equations.   The CKD-EPI equation is less accurate in patients with  extremes of muscle mass, extra-renal metabolism of  creatinine, excessive creatinine ingestion, or following  therapy that affects renal tubular secretion. Calcium 02/03/2023 9.5  8.3 - 10.6 mg/dL Final    Total Protein 02/03/2023 7.1  6.4 - 8.2 g/dL Final    Albumin 02/03/2023 4.2  3.4 - 5.0 g/dL Final    Albumin/Globulin Ratio 02/03/2023 1.4  1.1 - 2.2 Final    Total Bilirubin 02/03/2023 0.4  0.0 - 1.0 mg/dL Final    Alkaline Phosphatase 02/03/2023 87  40 - 129 U/L Final    ALT 02/03/2023 27  10 - 40 U/L Final    AST 02/03/2023 21  15 - 37 U/L Final         Assessment and Plan:  1. Morbid obesity with BMI of 45.0-49.9, adult ([facility] [address]  Discussed risks, benefits and alternatives of Saxenda. Patient meets BMI criteria, denies any personal or family history of medullary thyroid cancer, denies a history of multiple endocrine neoplasia syndrome type II, pancreatitis, alcoholism, high blood triglyceride levels, known allergy to liraglutide. Explained to patient that I will monitor his weight loss at 12 weeks and if he has not lost at least 5% of his body weight, Kreg Bijou will be discontinued. Counseled patient on proper use and potential side effects including nausea, vomiting, diarrhea, constipation, hypoglycemia, headache, decreased appetite, dyspepsia, fatigue, dizziness, abdominal pain, and increased lipase levels. Patient advised to report any side effects or if he develops a mass in the neck, dysphagia, dyspnea, or persistent hoarseness. Heavily counseled on the importance of therapeutic lifestyle changes through diet and exercise. 2. Dietary counseling and surveillance  [address]/low carb meal plan. 3. S/P laparoscopic sleeve gastrectomy  Avoid all carbonated drinks. Choose fluids that are sugar-free. Eat small, but frequent meals including a protein source with each meal. Eat meals over 30 minutes, taking small bites and chewing well. Take MVIs as directed. Nutrition Plan: [] LCHF/Ketogenic   [x] Modified low-calorie diet (low carb/low-tatiana)               [] Low-calorie diet    [] Maintenance       []Other        Exercise: [x] Cardio     [x] Resistance/strength training                       [x] ACSM recommendations (150 minutes/week in active weight loss)               Behavior: [x] Motivational interviewing performed    [] Referral for counseling                         [x] Discussed strategies to overcome habits/challenges for focus         [] Stress management   [x] Stimulus control         [] Sleep hygiene      Reviewed:  [x] Nutrition and the importance of regular protein intake  [x] Hidden carbohydrate sources  [x] Alcohol use  [x] Tobacco use   [x] Drug use- Denies  [x] Importance of exercise and reducing sedentary time  [x] Treatment consent- Patient understands and agrees with the treatment plan   [x] Proper use of medication and side effects      Controlled Substance Monitoring:    No flowsheet data found. Patient denies any history known hypersensitivity to the prescribing meds, history of pancreatitis, or personal or family history of thyroid medullary cancer. Treatment start date: 3/17/23  12 weeks: 6/17/23  Starting weight: TBD- will go to office for weight check    Goal: At least 5% of starting weight         Key dietary points:    - Meats (preferably organic or grass fed) are great sources of protein and have no carbohydrates. - Recommend coconut, olive, avocado, or almond oils. - When buying dairy, choose regular or full fat options. - Choose vegetables that grow above ground as they are generally lower in carbohydrates and higher in fiber.  - Avoid starches such as bread, rice, potatoes, pasta and all sources of simple sugars (desserts, soda, breakfast cereals). - Choose beverages that are calorie and sugar free. Reminder regarding weight loss medications:     You must be seen in office every 2-4 weeks to haveyour prescriptions refilled. If you are off of your medication for longer than 7 days, you will not be able to restart the medication for at least 6 months. Always call our office to report any side effects. No orders of the defined types were placed in this encounter. Return in about 4 weeks (around 4/13/2023) for MWM, meds. Kavya Ferguson is a 38 y.o. male being evaluated by a Virtual Visit (video visit) encounter to address concerns as mentioned above. A caregiver was present when appropriate. Due to this being a TeleHealth encounter evaluation of the following organ systems was limited: Vitals/Constitutional/EENT/Resp/CV/GI//MS/Neuro/Skin/Heme-Lymph-Imm. Kavya Ferguson, was evaluated through a synchronous (real-time) audio-video encounter. The patient (or guardian if applicable) is aware that this is a billable service, which includes applicable co-pays. This Virtual Visit was conducted with patient's (and/or legal guardian's) consent. The visit was conducted pursuant to the emergency declaration under the [address] [place] [address], [address] [place] [address] [facility] authority and the [employer] and [employer] General Act. Patient identification was verified, and a caregiver was present when appropriate. The patient was located at Home: [address] [facility]  Provider was located [facility] [place]         Total time spent for this encounter: Not billed by time    --Fabiola Vital MD on 3/16/2023 at 11:50 AM    An electronic signature was used to authenticate this note.

## 2023-05-15 ENCOUNTER — TELEPHONE (OUTPATIENT)
Dept: BARIATRICS/WEIGHT MGMT | Age: 43
End: 2023-05-15

## 2023-05-15 NOTE — TELEPHONE ENCOUNTER
Patient called to reschedule appt that he missed on 5/11. Patient is on Saxenda and I do not see any open spots. Patient states he has a pen and a half left. Please call patient to schedule appointment.

## 2023-05-17 ENCOUNTER — TELEMEDICINE (OUTPATIENT)
Dept: BARIATRICS/WEIGHT MGMT | Age: 43
End: 2023-05-17
Payer: COMMERCIAL

## 2023-05-17 DIAGNOSIS — E66.01 MORBID OBESITY WITH BMI OF 45.0-49.9, ADULT (HCC): Primary | ICD-10-CM

## 2023-05-17 DIAGNOSIS — Z71.3 DIETARY COUNSELING AND SURVEILLANCE: ICD-10-CM

## 2023-05-17 PROCEDURE — 99214 OFFICE O/P EST MOD 30 MIN: CPT | Performed by: FAMILY MEDICINE

## 2023-05-17 RX ORDER — LIRAGLUTIDE 6 MG/ML
INJECTION, SOLUTION SUBCUTANEOUS
Qty: 5 ADJUSTABLE DOSE PRE-FILLED PEN SYRINGE | Refills: 0 | Status: SHIPPED | OUTPATIENT
Start: 2023-05-17

## 2023-05-17 ASSESSMENT — ENCOUNTER SYMPTOMS
BLOOD IN STOOL: 0
PHOTOPHOBIA: 0
VOMITING: 0
CONSTIPATION: 0
SHORTNESS OF BREATH: 0
ABDOMINAL PAIN: 0
ABDOMINAL DISTENTION: 0
APNEA: 0
EYE PAIN: 0
WHEEZING: 0
NAUSEA: 0
CHEST TIGHTNESS: 0
DIARRHEA: 0
CHOKING: 0
COUGH: 0

## 2023-05-17 NOTE — PROGRESS NOTES
6/27/23  Starting weight: 371 pounds   Goal: At least 5% of starting weight (18.5 pounds)  Total weight loss: 4 pounds         Key dietary points:    - Meats (preferably organic or grass fed) are great sources of protein and have no carbohydrates. - Recommend coconut, olive, avocado, or almond oils. - When buying dairy, choose regular or full fat options. - Choose vegetables that grow above ground as they are generally lower in carbohydrates and higher in fiber.  - Avoid starches such as bread, rice, potatoes, pasta and all sources of simple sugars (desserts, soda, breakfast cereals). - Choose beverages that are calorie and sugar free. Reminder regarding weight loss medications: You must be seen in office every 2-4 weeks to haveyour prescriptions refilled. If you are off of your medication for longer than 7 days, you will not be able to restart the medication for at least 6 months. Always call our office to report any side effects. No orders of the defined types were placed in this encounter. Return in about 4 weeks (around 6/14/2023) for MWM, meds. Royer Bai is a 38 y.o. male being evaluated by a Virtual Visit (video visit) encounter to address concerns as mentioned above. A caregiver was present when appropriate. Due to this being a TeleHealth encounter evaluation of the following organ systems was limited: Vitals/Constitutional/EENT/Resp/CV/GI//MS/Neuro/Skin/Heme-Lymph-Imm. Royer Bai, was evaluated through a synchronous (real-time) audio-video encounter. The patient (or guardian if applicable) is aware that this is a billable service, which includes applicable co-pays. This Virtual Visit was conducted with patient's (and/or legal guardian's) consent. Patient identification was verified, and a caregiver was present when appropriate.    The patient was located at Home: [address] [facility]  [facility] [place] [address]

## 2023-07-13 ENCOUNTER — TELEMEDICINE (OUTPATIENT)
Dept: BARIATRICS/WEIGHT MGMT | Age: 43
End: 2023-07-13
Payer: COMMERCIAL

## 2023-07-13 VITALS — WEIGHT: 315 LBS | BODY MASS INDEX: 40.43 KG/M2 | HEIGHT: 74 IN

## 2023-07-13 DIAGNOSIS — E66.01 MORBID OBESITY WITH BMI OF 45.0-49.9, ADULT (HCC): Primary | ICD-10-CM

## 2023-07-13 DIAGNOSIS — Z71.3 DIETARY COUNSELING AND SURVEILLANCE: ICD-10-CM

## 2023-07-13 PROCEDURE — 99214 OFFICE O/P EST MOD 30 MIN: CPT | Performed by: FAMILY MEDICINE

## 2023-07-13 ASSESSMENT — ENCOUNTER SYMPTOMS
DIARRHEA: 0
ABDOMINAL DISTENTION: 0
NAUSEA: 0
COUGH: 0
APNEA: 0
VOMITING: 0
CONSTIPATION: 0
SHORTNESS OF BREATH: 0
CHOKING: 0
ABDOMINAL PAIN: 0
PHOTOPHOBIA: 0
EYE PAIN: 0
CHEST TIGHTNESS: 0
WHEEZING: 0
BLOOD IN STOOL: 0

## 2023-07-13 NOTE — PROGRESS NOTES
release tablet, TAKE 1 TABLET EVERY MORNING, Disp: 30 tablet, Rfl: 0    fluticasone (FLONASE) 50 MCG/ACT nasal spray, 2 sprays by Nasal route 2 times daily, Disp: 16 g, Rfl: 5    tamsulosin (FLOMAX) 0.4 MG capsule, TAKE 1 CAPSULE BY MOUTH EVERY DAY, Disp: , Rfl:     Multiple Vitamin (MULTIVITAMIN, BARIATRIC FUSION COMPLETE, CHEW TAB), Take 4 tablets by mouth daily, Disp: , Rfl:     Patient Active Problem List   Diagnosis    Morbid obesity with BMI of 50.0-59.9, adult ([facility])    Anxiety    LAUREN (obstructive sleep apnea)    Neutrophilic leukocytosis    Pain of left leg    Subcutaneous nodule of left lower extremity    Lipoma of abdominal wall    Prediabetes    Vitamin D deficiency       Review of Systems   Constitutional:  Negative for fatigue. Eyes:  Negative for photophobia, pain and visual disturbance. Respiratory:  Negative for apnea, cough, choking, chest tightness, shortness of breath and wheezing. Cardiovascular:  Negative for chest pain, palpitations and leg swelling. Gastrointestinal:  Negative for abdominal distention, abdominal pain, blood in stool, constipation, diarrhea, nausea and vomiting. Endocrine: Negative for cold intolerance and heat intolerance. Musculoskeletal:  Negative for arthralgias and myalgias. Skin:  Negative for rash. Neurological:  Negative for dizziness, tremors, syncope, weakness, numbness and headaches. Psychiatric/Behavioral:  Negative for agitation, confusion, decreased concentration, dysphoric mood, hallucinations, sleep disturbance and suicidal ideas. The patient is not nervous/anxious and is not hyperactive. Physical Exam  Constitutional:       Appearance: He is well-developed. HENT:      Head: Normocephalic. Eyes:      Conjunctiva/sclera: Conjunctivae normal.   Abdominal:      General: Abdomen is protuberant. Musculoskeletal:         General: No swelling. Neurological:      Mental Status: He is alert and oriented to person, place, and time.

## 2023-07-18 ENCOUNTER — CLINICAL DOCUMENTATION (OUTPATIENT)
Dept: BARIATRICS/WEIGHT MGMT | Age: 43
End: 2023-07-18

## 2023-07-18 NOTE — PROGRESS NOTES
Dietary Assessment Note  This eval was conducted via phone on 23 in preparation for their visit on 23 with Dr. Juan Antonio Mendez. Vitals:  369 lb, per pt repot    Patient gained 3 lbs over past ~6 months. Total Weight Loss: 85.4 lbs    Labs reviewed: 2/3/23 & 3/16/23     Protein intake: 60-80 grams/day     Fluid intake: 48 >64 oz/day- water / powerade zero / drinking some diet soda (2 per wk)    Multivitamin/mineral intake: yes - 2 centrum    Calcium intake: yes - 2 citracal max plus    Other: fiber    Exercise: yes - 3-4x/wk gym 1-1.5 hours, walking & weights    Nutrition Assessment: 2 year post-op visit. Pt has been carrying for his dad and recently his sister too, she had a stroke. She is doing better. Meal prep time has decreased. Pt feels like volume has increased, lacks structure. Pt is on Saxenda does not feel like it is helping.   Finishing what he has and is scheduled with behaviorist.    B- protein shake  L- chili  D- fast food   Will snack btw, nothing structured- snack while cooking or while doing laundry    Amount able to eat per sittin plus cups of food    Following  rule: yes    Food Intolerances/issues: none    Client Concerns: off track / increased hunger & volume     Goals:   - F/u with behaviorist  - Plan/prep- pick a day a week to set-up next 7 days    Plan: f/u as directed    Siobhan Valdes, GOPI, LD

## 2023-07-20 ENCOUNTER — OFFICE VISIT (OUTPATIENT)
Dept: BARIATRICS/WEIGHT MGMT | Age: 43
End: 2023-07-20
Payer: COMMERCIAL

## 2023-07-20 VITALS — HEIGHT: 74 IN | WEIGHT: 315 LBS | BODY MASS INDEX: 40.43 KG/M2

## 2023-07-20 DIAGNOSIS — Z98.84 S/P LAPAROSCOPIC SLEEVE GASTRECTOMY: ICD-10-CM

## 2023-07-20 DIAGNOSIS — E66.01 MORBID OBESITY WITH BMI OF 45.0-49.9, ADULT (HCC): Primary | ICD-10-CM

## 2023-07-20 DIAGNOSIS — R73.03 PREDIABETES: ICD-10-CM

## 2023-07-20 PROCEDURE — 99214 OFFICE O/P EST MOD 30 MIN: CPT | Performed by: SURGERY

## 2023-07-27 ENCOUNTER — OFFICE VISIT (OUTPATIENT)
Dept: ENT CLINIC | Age: 43
End: 2023-07-27
Payer: COMMERCIAL

## 2023-07-27 VITALS
SYSTOLIC BLOOD PRESSURE: 107 MMHG | DIASTOLIC BLOOD PRESSURE: 73 MMHG | HEART RATE: 80 BPM | BODY MASS INDEX: 40.43 KG/M2 | HEIGHT: 74 IN | WEIGHT: 315 LBS | OXYGEN SATURATION: 97 % | RESPIRATION RATE: 16 BRPM

## 2023-07-27 DIAGNOSIS — J31.0 CHRONIC RHINITIS: ICD-10-CM

## 2023-07-27 DIAGNOSIS — H61.23 BILATERAL IMPACTED CERUMEN: ICD-10-CM

## 2023-07-27 DIAGNOSIS — H93.8X3 SENSATION OF FULLNESS IN BOTH EARS: Primary | ICD-10-CM

## 2023-07-27 DIAGNOSIS — J34.2 DEVIATED NASAL SEPTUM: ICD-10-CM

## 2023-07-27 PROCEDURE — 69210 REMOVE IMPACTED EAR WAX UNI: CPT | Performed by: STUDENT IN AN ORGANIZED HEALTH CARE EDUCATION/TRAINING PROGRAM

## 2023-07-27 PROCEDURE — 99213 OFFICE O/P EST LOW 20 MIN: CPT | Performed by: STUDENT IN AN ORGANIZED HEALTH CARE EDUCATION/TRAINING PROGRAM

## 2023-07-27 NOTE — PROGRESS NOTES
[address]      Patient Name: Lindsay Hernandez [facility] Record Number:  [id number]  Primary Care Physician:  Alexandre Love MD  Date of Consultation: 7/27/2023      Chief Complaint:   Chief Complaint   Patient presents with    Cerumen Impaction     Bilateral         [id number] is a(n) 38 y.o. male who presents today for evaluation of ear issues. Patient states that his ears feel full and he feels like they are clogged. He will have some decreased sense of hearing when he lays down. This is intermittent. He has had this problem before. He has had his ears cleaned in the past and this seems to help his symptoms. 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 He has tried nasal sprays in the past but these did not help. He does not use sinus irrigations or any other medications in his nose. He is not getting nasal bleeding. He will get intermittent sinus infections, usually several per year. I independently reviewed the patients past medical history, past surgical history, and social history. They are unremarkable except as noted in the HPI and below. The patient denies any family history related to the current complaint, and they deny any family history of bleeding disorders or difficulties with anesthesia unless noted below. Update 4/27/2021:    Patient presents today for follow-up. He has been using the nasal steroid sprays and irrigations. He feels like this helped him somewhat. However he is still having some clear nasal drainage and nasal obstruction. He also feels like his ears are still clogged up. He feels like his hearing is down. Update 8/19/2021:    The patient presents today for follow-up regarding his chronic sinonasal complaints. He is no longer using nasal steroid sprays in his nose. He is still getting intermittent congestion.   He feels like his ears

## 2023-10-12 ENCOUNTER — TELEMEDICINE (OUTPATIENT)
Dept: BARIATRICS/WEIGHT MGMT | Age: 43
End: 2023-10-12
Payer: COMMERCIAL

## 2023-10-12 DIAGNOSIS — Z98.84 S/P LAPAROSCOPIC SLEEVE GASTRECTOMY: ICD-10-CM

## 2023-10-12 DIAGNOSIS — E66.01 MORBID OBESITY WITH BMI OF 45.0-49.9, ADULT (HCC): Primary | ICD-10-CM

## 2023-10-12 DIAGNOSIS — Z71.3 DIETARY COUNSELING AND SURVEILLANCE: ICD-10-CM

## 2023-10-12 PROCEDURE — 99214 OFFICE O/P EST MOD 30 MIN: CPT | Performed by: FAMILY MEDICINE

## 2023-10-12 ASSESSMENT — ENCOUNTER SYMPTOMS
EYE PAIN: 0
DIARRHEA: 0
ABDOMINAL DISTENTION: 0
ABDOMINAL PAIN: 0
SHORTNESS OF BREATH: 0
CONSTIPATION: 0
PHOTOPHOBIA: 0
WHEEZING: 0
CHOKING: 0
COUGH: 0
BLOOD IN STOOL: 0
CHEST TIGHTNESS: 0
NAUSEA: 0
VOMITING: 0
APNEA: 0

## 2024-01-08 NOTE — PROGRESS NOTES
[facility] PRE-OPERATIVE INSTRUCTIONS    Day of Procedure:  1/25              Arrival time:   9             Surgery time:1030    Take the following medications with a sip of water:  Follow your MD/Surgeons pre-procedure instructions regarding your medications     Do not eat or drink anything after 12:00 midnight except for your prep prior to your surgery.  This includes water chewing gum, mints and ice chips.   You may brush your teeth and gargle the morning of your surgery, but do not swallow the water     Please see your family doctor/pediatrician for a history and physical and/or concerning medications.   Bring any test results/reports from your physicians office.   If you are under the care of a heart doctor or specialist doctor, please be aware that you may be asked to them for clearance    You may be asked to stop blood thinners such as Coumadin, Plavix, Fragmin, Lovenox, etc., or any anti-inflammatories such as:  Aspirin, Ibuprofen, Advil, Naproxen prior to your surgery.    We also ask that you stop any OTC medications such as fish oil, vitamin E, glucosamine, garlic, Multivitamins, COQ 10, etc.    We ask that you do not smoke 24 hours prior to surgery  We ask that you do not  drink any alcoholic beverages 24 hours prior to surgery     You must make arrangements for a responsible adult to take you home after your surgery.    For your safety you will not be allowed to leave alone or drive yourself home.  Your surgery will be cancelled if you do not have a ride home.     Also for your safety, it is strongly suggested that someone stay with you the first 24 hours after your surgery.     A parent or legal guardian must accompany a child scheduled for surgery and plan to stay at the hospital until the child is discharged.    Please do not bring other children with you.    For your comfort, please wear simple loose fitting clothing to the hospital.  Please do not bring valuables.    Do not wear any

## 2024-01-08 NOTE — PROGRESS NOTES
[facility] Pre-Admission Testing Electronic Communication Worksheet for OR/ENDO Procedures        Patient: Flip Hassan    DOS: 1/25    Arrival Time: 0900    Surgery Time:1030    Meds to Bed:  [] YES    [x]  NO    Transportation Confirmed: [x] YES    []  NO aware needs a  to stay    History and Physical:  [x] YES    []  NO  [] N/A  If yes, please list doctor or Urgent Care and date of H&P:     Additional Clearance(Cardiac, Pulmonary, etc):  [] YES    []  NO    Pre-Admission Testing Visit:  [] YES    [x]  NO If no, do labs/testing need to be done DOS?  [] YES    [x]  NO    Medication Reconciliation Complete:  [x] YES    []  NO        Additional Notes:                Interview Complete: [x] YES    []  NO          Lore Barr, RN  5:04 PM

## 2024-01-24 ENCOUNTER — ANESTHESIA EVENT (OUTPATIENT)
Dept: ENDOSCOPY | Age: 44
End: 2024-01-24
Payer: COMMERCIAL

## 2024-01-25 ENCOUNTER — HOSPITAL ENCOUNTER (OUTPATIENT)
Age: 44
Setting detail: OUTPATIENT SURGERY
Discharge: HOME | End: 2024-01-25
Attending: INTERNAL MEDICINE | Admitting: INTERNAL MEDICINE
Payer: COMMERCIAL

## 2024-01-25 ENCOUNTER — ANESTHESIA (OUTPATIENT)
Dept: ENDOSCOPY | Age: 44
End: 2024-01-25
Payer: COMMERCIAL

## 2024-01-25 VITALS
BODY MASS INDEX: 40.43 KG/M2 | OXYGEN SATURATION: 97 % | TEMPERATURE: 98.4 F | DIASTOLIC BLOOD PRESSURE: 70 MMHG | SYSTOLIC BLOOD PRESSURE: 111 MMHG | RESPIRATION RATE: 16 BRPM | HEART RATE: 75 BPM | HEIGHT: 74 IN | WEIGHT: 315 LBS

## 2024-01-25 LAB
GLUCOSE BLD-MCNC: 95 MG/DL (ref 70–99)
PERFORMED ON: NORMAL

## 2024-01-25 PROCEDURE — 3700000001 HC ADD 15 MINUTES (ANESTHESIA): Performed by: INTERNAL MEDICINE

## 2024-01-25 PROCEDURE — 3609027000 HC COLONOSCOPY: Performed by: INTERNAL MEDICINE

## 2024-01-25 PROCEDURE — 7100000011 HC PHASE II RECOVERY - ADDTL 15 MIN: Performed by: INTERNAL MEDICINE

## 2024-01-25 PROCEDURE — 6360000002 HC RX W HCPCS

## 2024-01-25 PROCEDURE — 7100000001 HC PACU RECOVERY - ADDTL 15 MIN: Performed by: INTERNAL MEDICINE

## 2024-01-25 PROCEDURE — 2580000003 HC RX 258: Performed by: ANESTHESIOLOGY

## 2024-01-25 PROCEDURE — 7100000000 HC PACU RECOVERY - FIRST 15 MIN: Performed by: INTERNAL MEDICINE

## 2024-01-25 PROCEDURE — 3700000000 HC ANESTHESIA ATTENDED CARE: Performed by: INTERNAL MEDICINE

## 2024-01-25 PROCEDURE — 7100000010 HC PHASE II RECOVERY - FIRST 15 MIN: Performed by: INTERNAL MEDICINE

## 2024-01-25 PROCEDURE — 2709999900 HC NON-CHARGEABLE SUPPLY: Performed by: INTERNAL MEDICINE

## 2024-01-25 PROCEDURE — 2500000003 HC RX 250 WO HCPCS

## 2024-01-25 RX ORDER — SODIUM CHLORIDE 0.9 % (FLUSH) 0.9 %
5-40 SYRINGE (ML) INJECTION PRN
Status: DISCONTINUED | OUTPATIENT
Start: 2024-01-25 | End: 2024-01-25 | Stop reason: HOSPADM

## 2024-01-25 RX ORDER — SODIUM CHLORIDE 0.9 % (FLUSH) 0.9 %
5-40 SYRINGE (ML) INJECTION EVERY 12 HOURS SCHEDULED
Status: DISCONTINUED | OUTPATIENT
Start: 2024-01-25 | End: 2024-01-25 | Stop reason: HOSPADM

## 2024-01-25 RX ORDER — LIDOCAINE HYDROCHLORIDE 20 MG/ML
INJECTION, SOLUTION EPIDURAL; INFILTRATION; INTRACAUDAL; PERINEURAL PRN
Status: DISCONTINUED | OUTPATIENT
Start: 2024-01-25 | End: 2024-01-25 | Stop reason: SDUPTHER

## 2024-01-25 RX ORDER — SODIUM CHLORIDE 9 MG/ML
INJECTION, SOLUTION INTRAVENOUS PRN
Status: DISCONTINUED | OUTPATIENT
Start: 2024-01-25 | End: 2024-01-25 | Stop reason: HOSPADM

## 2024-01-25 RX ORDER — PROPOFOL 10 MG/ML
INJECTION, EMULSION INTRAVENOUS PRN
Status: DISCONTINUED | OUTPATIENT
Start: 2024-01-25 | End: 2024-01-25 | Stop reason: SDUPTHER

## 2024-01-25 RX ADMIN — SODIUM CHLORIDE: 9 INJECTION, SOLUTION INTRAVENOUS at 09:08

## 2024-01-25 RX ADMIN — LIDOCAINE HYDROCHLORIDE 50 MG: 20 INJECTION, SOLUTION EPIDURAL; INFILTRATION; INTRACAUDAL; PERINEURAL at 09:29

## 2024-01-25 RX ADMIN — PROPOFOL 150 MCG/KG/MIN: 10 INJECTION, EMULSION INTRAVENOUS at 09:30

## 2024-01-25 RX ADMIN — PROPOFOL 150 MG: 10 INJECTION, EMULSION INTRAVENOUS at 09:29

## 2024-01-25 ASSESSMENT — PAIN - FUNCTIONAL ASSESSMENT
PAIN_FUNCTIONAL_ASSESSMENT: 0-10
PAIN_FUNCTIONAL_ASSESSMENT: NONE - DENIES PAIN

## 2024-01-25 ASSESSMENT — PAIN DESCRIPTION - DESCRIPTORS: DESCRIPTORS: DISCOMFORT

## 2024-01-25 NOTE — OP NOTE
identified by characteristic anatomy and ballottment. The ileocecal valve was identified.     The preparation was good.    The terminal ileum was not visualized.    The scope was then withdrawn back through the cecum, ascending, transverse, descending, sigmoid colon, and rectum.  Careful circumferential examination of the mucosa in these areas demonstrated:    1) The visualized colonic mucosa was without visible abnormalities.  Specifically, no colon polyps were identified.  2) Moderate left sided and mild right sided diverticulosis was noted.     The scope was then withdrawn into the rectum and retroflexed.  The retroflexed view of the anal verge and rectum demonstrates moderate internal hemorrhoids.  The scope was straightened, the colon was decompressed and the scope was withdrawn from the patient.  The patient tolerated the procedure well and was taken to the PACU in good condition.    Estimated blood loss: None    * No specimens in log *      Impression:  See post-procedure diagnoses.    Recommendations:  Repeat colonoscopy in 5 years.      Major Lawson Jr, [facility] [place]  1/25/2024  [phone]

## 2024-01-25 NOTE — H&P
Pre-operative History and Physical    Patient: Flip Hassan  : 1980  Acct#:     Intended Procedure:  Colonoscopy    HISTORY OF PRESENT ILLNESS:  The patient is a 43 y.o. male  who presents for/due to screening colonoscopy. Family history of 2 second degree relatives with colon cancer.  History of colon polyps.  Surveillance colonoscopy.         Past Medical History:        Diagnosis Date    Anemia     Anxiety     Chronic GERD     Depression     Influenza A 2017    Kidney stones     Prostatitis     Rectal bleeding     intermittently    Ulcerative colitis (HCC)     Unspecified sleep apnea     USES CPAP     Past Surgical History:        Procedure Laterality Date    ABDOMEN SURGERY Bilateral 1985    hernia repair    COLONOSCOPY      tics, ulcerative colitis    COLONOSCOPY  2014    O'Vasyl-polyp x 2, diverticulosis    SLEEVE GASTRECTOMY N/A 2021    [facility]    UPPER GASTROINTESTINAL ENDOSCOPY N/A 2020    EGD BIOPSY performed by Jamaal Cash DO at [facility]     Medications Prior to Admission:   No current facility-administered medications on file prior to encounter.     Current Outpatient Medications on File Prior to Encounter   Medication Sig Dispense Refill    citalopram (CELEXA) 40 MG tablet TAKE 1 TABLET DAILY 90 tablet 3    Insulin Pen Needle 32G X 4 MM MISC 1 each by Does not apply route daily 100 each 3    tamsulosin (FLOMAX) 0.4 MG capsule TAKE 1 CAPSULE BY MOUTH EVERY DAY      Multiple Vitamin (MULTIVITAMIN, BARIATRIC FUSION COMPLETE, CHEW TAB) Take 2 tablets by mouth daily          Allergies:  Patient has no known allergies.    Social History:   TOBACCO:   reports that he has never smoked. He has never used smokeless tobacco.  ETOH:   reports current alcohol use.  DRUGS:   reports no history of drug use.    PHYSICAL EXAM:      Vital Signs: BP (!) 147/79   Pulse 80   Temp 97.7 °F (36.5 °C) (Temporal)   Resp 19   Ht 1.88 m (6' 2\")   Wt (!) 174 kg (383 lb 9.6 oz)

## 2024-01-25 NOTE — PROGRESS NOTES
Pt tolerating po. Discharge instructions given to patient and his friend. Verbalized understanding. IV d/c'd.

## 2024-01-25 NOTE — ANESTHESIA PRE PROCEDURE
Induction: intravenous.      Anesthetic plan and risks discussed with patient.      Plan discussed with CRNA.                    This pre-anesthesia assessment may be used as a history and physical.    DOS STAFF ADDENDUM:    Pt seen and examined, chart reviewed (including anesthesia, drug and allergy history).  No interval changes to history and physical examination.  Anesthetic plan, risks, benefits, alternatives, and personnel involved discussed with patient.  Patient verbalized an understanding and agrees to proceed.      Virgilio Boggs MD  January 25, 2024  8:56 AM

## 2024-01-25 NOTE — ANESTHESIA POSTPROCEDURE EVALUATION
Department of Anesthesiology  Postprocedure Note    Patient: Flip Hassan  MRN: [id number]  YOB: 1980  Date of evaluation: 1/25/2024    Procedure Summary       Date: 01/25/24 Room / Location: [place] [id number] / [facility]    Anesthesia Start: 0925 Anesthesia Stop: 0954    Procedure: COLONOSCOPY Diagnosis:       Chronic constipation      (Chronic constipation [K59.09])    Surgeons: Major Lawson Jr., DO Responsible Provider: Virgilio Boggs MD    Anesthesia Type: MAC ASA Status: 3            Anesthesia Type: No value filed.    Yolanda Phase I: Yolanda Score: 10    Yolanda Phase II: Yolanda Score: 10    Anesthesia Post Evaluation    Patient location during evaluation: PACU  Patient participation: complete - patient participated  Level of consciousness: awake and alert  Pain score: 0  Airway patency: patent  Nausea & Vomiting: no nausea and no vomiting  Cardiovascular status: blood pressure returned to baseline  Respiratory status: acceptable  Hydration status: euvolemic  Pain management: adequate    No notable events documented.

## 2024-01-25 NOTE — PROGRESS NOTES
Sedation provided per anesthesia. See anesthesia record for medication administration and vitals.

## 2024-01-25 NOTE — DISCHARGE INSTRUCTIONS
The patient is recommended to have a repeat colonoscopy in 5 years per current screening/surveillance guidelines.     Discharge Instructions for Colonoscopy     Colonoscopy is a visual exam of the lining of the large intestine, also called the bowel or colon, with a colonoscope. A colonoscope is a flexible tube with a light and a viewing device. It allows the doctor to view the inside of the colon through a tiny video camera.     Colonoscopy is performed for many reasons: unexplained anemia , pain, diarrhea , bloody stools, cancer screening, among many other reasons.     Complications from a colonoscopy are rare. Some possible serious complications include perforated bowel (which might require surgery) and bleeding (which could require blood transfusion ). Minor complications include bloating, gas, and cramping that can last for 1-2 days after the procedure.     Because air is put into your colon during the procedure, it is normal to pass large amounts of air from your rectum. You may not have a bowel movement for 1-3 days after the procedure.     What You Will Need:  Someone to drive you home after the procedure     Steps to Take:  Home Care -  Rest when you get home.   Because the sedative will make you drowsy, don't drive, operate machinery, or make important decisions the day of the procedure.  Feelings of bloating, gas, or cramping may persist for 24 hours.   Diet -  Try sips of water first. If tolerated, resume bland food (scrambled eggs, toast, soup) first.  If tolerated, resume regular diet or the diet recommended by your physician.   Do not drink alcohol for 24 hours.   Physical Activity -  Ask your doctor when you will be able to return to work.   Do not drive, operate heavy machinery, or do activities that require coordination or balance for 24 hours.   Otherwise, return to your normal routine as soon as you are comfortable to do so, which is usually the next day after the procedure.   Medications - When

## 2024-01-25 NOTE — PROGRESS NOTES
Pt received into [facility] from Endo. Report obtained. Vss. Pt stable. Awake, alert, and oriented. Room air. Abd soft. Denies pain.

## 2024-01-26 ENCOUNTER — TELEMEDICINE (OUTPATIENT)
Dept: BARIATRICS/WEIGHT MGMT | Age: 44
End: 2024-01-26
Payer: COMMERCIAL

## 2024-01-26 DIAGNOSIS — Z98.84 S/P LAPAROSCOPIC SLEEVE GASTRECTOMY: ICD-10-CM

## 2024-01-26 DIAGNOSIS — Z71.3 DIETARY COUNSELING AND SURVEILLANCE: ICD-10-CM

## 2024-01-26 DIAGNOSIS — E66.01 MORBID OBESITY WITH BMI OF 45.0-49.9, ADULT (HCC): Primary | ICD-10-CM

## 2024-01-26 PROCEDURE — 99214 OFFICE O/P EST MOD 30 MIN: CPT | Performed by: FAMILY MEDICINE

## 2024-01-26 ASSESSMENT — ENCOUNTER SYMPTOMS
EYE PAIN: 0
CONSTIPATION: 0
ABDOMINAL DISTENTION: 0
SHORTNESS OF BREATH: 0
BLOOD IN STOOL: 0
VOMITING: 0
CHOKING: 0
DIARRHEA: 0
ABDOMINAL PAIN: 0
COUGH: 0
WHEEZING: 0
APNEA: 0
NAUSEA: 0
CHEST TIGHTNESS: 0
PHOTOPHOBIA: 0

## 2024-01-26 NOTE — PROGRESS NOTES
Anxiety    LAUREN (obstructive sleep apnea)    Neutrophilic leukocytosis    Pain of left leg    Subcutaneous nodule of left lower extremity    Lipoma of abdominal wall    Prediabetes    Vitamin D deficiency       Review of Systems   Constitutional:  Negative for fatigue.   Eyes:  Negative for photophobia, pain and visual disturbance.   Respiratory:  Negative for apnea, cough, choking, chest tightness, shortness of breath and wheezing.    Cardiovascular:  Negative for chest pain, palpitations and leg swelling.   Gastrointestinal:  Negative for abdominal distention, abdominal pain, blood in stool, constipation, diarrhea, nausea and vomiting.   Endocrine: Negative for cold intolerance and heat intolerance.   Musculoskeletal:  Negative for arthralgias and myalgias.   Skin:  Negative for rash.   Neurological:  Negative for dizziness, tremors, syncope, weakness, numbness and headaches.   Psychiatric/Behavioral:  Negative for agitation, confusion, decreased concentration, dysphoric mood, hallucinations, sleep disturbance and suicidal ideas. The patient is not nervous/anxious and is not hyperactive.        Physical Exam  Constitutional:       Appearance: He is well-developed.   HENT:      Head: Normocephalic.   Eyes:      Conjunctiva/sclera: Conjunctivae normal.   Abdominal:      General: Abdomen is protuberant.   Musculoskeletal:         General: No swelling.   Neurological:      Mental Status: He is alert and oriented to person, place, and time.   Psychiatric:         Mood and Affect: Mood normal.         Behavior: Behavior normal.         Thought Content: Thought content normal.         Judgment: Judgment normal.         Admission on 01/25/2024, Discharged on 01/25/2024   Component Date Value Ref Range Status    POC Glucose 01/25/2024 95  70 - 99 mg/dl Final    Performed on 01/25/2024 ACCU-CHEK   Final         Assessment and Plan:  1. Morbid obesity with BMI of 45.0-49.9, adult (HCC)  Stable, not at goal.  Low carb/tatiana meal

## 2024-01-29 DIAGNOSIS — E66.01 MORBID OBESITY WITH BMI OF 45.0-49.9, ADULT (HCC): ICD-10-CM

## 2024-01-29 LAB
25(OH)D3 SERPL-MCNC: 21.9 NG/ML
ALBUMIN SERPL-MCNC: 4.3 G/DL (ref 3.4–5)
ALBUMIN/GLOB SERPL: 1.4 {RATIO} (ref 1.1–2.2)
ALP SERPL-CCNC: 102 U/L (ref 40–129)
ALT SERPL-CCNC: 26 U/L (ref 10–40)
ANION GAP SERPL CALCULATED.3IONS-SCNC: 14 MMOL/L (ref 3–16)
AST SERPL-CCNC: 19 U/L (ref 15–37)
BASOPHILS # BLD: 0.1 K/UL (ref 0–0.2)
BASOPHILS NFR BLD: 1.1 %
BILIRUB SERPL-MCNC: 0.4 MG/DL (ref 0–1)
BUN SERPL-MCNC: 10 MG/DL (ref 7–20)
CALCIUM SERPL-MCNC: 9.3 MG/DL (ref 8.3–10.6)
CHLORIDE SERPL-SCNC: 104 MMOL/L (ref 99–110)
CHOLEST SERPL-MCNC: 165 MG/DL (ref 0–199)
CO2 SERPL-SCNC: 27 MMOL/L (ref 21–32)
CREAT SERPL-MCNC: 0.7 MG/DL (ref 0.9–1.3)
DEPRECATED RDW RBC AUTO: 13.8 % (ref 12.4–15.4)
EOSINOPHIL # BLD: 0.5 K/UL (ref 0–0.6)
EOSINOPHIL NFR BLD: 5.1 %
FOLATE SERPL-MCNC: 5.18 NG/ML (ref 4.78–24.2)
GFR SERPLBLD CREATININE-BSD FMLA CKD-EPI: >60 ML/MIN/{1.73_M2}
GLUCOSE SERPL-MCNC: 88 MG/DL (ref 70–99)
HCT VFR BLD AUTO: 44.6 % (ref 40.5–52.5)
HDLC SERPL-MCNC: 51 MG/DL (ref 40–60)
HGB BLD-MCNC: 14.9 G/DL (ref 13.5–17.5)
LDLC SERPL CALC-MCNC: 98 MG/DL
LYMPHOCYTES # BLD: 2.5 K/UL (ref 1–5.1)
LYMPHOCYTES NFR BLD: 23.9 %
MCH RBC QN AUTO: 29.1 PG (ref 26–34)
MCHC RBC AUTO-ENTMCNC: 33.5 G/DL (ref 31–36)
MCV RBC AUTO: 86.9 FL (ref 80–100)
MONOCYTES # BLD: 0.6 K/UL (ref 0–1.3)
MONOCYTES NFR BLD: 5.5 %
NEUTROPHILS # BLD: 6.8 K/UL (ref 1.7–7.7)
NEUTROPHILS NFR BLD: 64.4 %
PLATELET # BLD AUTO: 424 K/UL (ref 135–450)
PMV BLD AUTO: 8.8 FL (ref 5–10.5)
POTASSIUM SERPL-SCNC: 4.1 MMOL/L (ref 3.5–5.1)
PROT SERPL-MCNC: 7.4 G/DL (ref 6.4–8.2)
RBC # BLD AUTO: 5.13 M/UL (ref 4.2–5.9)
SODIUM SERPL-SCNC: 145 MMOL/L (ref 136–145)
TRIGL SERPL-MCNC: 81 MG/DL (ref 0–150)
TSH SERPL DL<=0.005 MIU/L-ACNC: 3.68 UIU/ML (ref 0.27–4.2)
VIT B12 SERPL-MCNC: 784 PG/ML (ref 211–911)
VLDLC SERPL CALC-MCNC: 16 MG/DL
WBC # BLD AUTO: 10.6 K/UL (ref 4–11)

## 2024-01-30 LAB
EST. AVERAGE GLUCOSE BLD GHB EST-MCNC: 111.2 MG/DL
HBA1C MFR BLD: 5.5 %

## 2024-02-03 ENCOUNTER — TELEPHONE (OUTPATIENT)
Dept: BARIATRICS/WEIGHT MGMT | Age: 44
End: 2024-02-03

## 2024-02-03 ENCOUNTER — TELEMEDICINE (OUTPATIENT)
Dept: BARIATRICS/WEIGHT MGMT | Age: 44
End: 2024-02-03
Payer: COMMERCIAL

## 2024-02-03 DIAGNOSIS — E66.01 MORBID OBESITY WITH BMI OF 45.0-49.9, ADULT (HCC): Primary | ICD-10-CM

## 2024-02-03 DIAGNOSIS — E55.9 VITAMIN D INSUFFICIENCY: ICD-10-CM

## 2024-02-03 DIAGNOSIS — Z71.3 DIETARY COUNSELING AND SURVEILLANCE: ICD-10-CM

## 2024-02-03 DIAGNOSIS — Z98.84 S/P LAPAROSCOPIC SLEEVE GASTRECTOMY: ICD-10-CM

## 2024-02-03 PROCEDURE — 99214 OFFICE O/P EST MOD 30 MIN: CPT | Performed by: FAMILY MEDICINE

## 2024-02-03 RX ORDER — TIRZEPATIDE 2.5 MG/.5ML
0.5 INJECTION, SOLUTION SUBCUTANEOUS
Qty: 0.5 ML | Refills: 3 | Status: SHIPPED | OUTPATIENT
Start: 2024-02-03

## 2024-02-03 ASSESSMENT — ENCOUNTER SYMPTOMS
PHOTOPHOBIA: 0
SHORTNESS OF BREATH: 0
ABDOMINAL DISTENTION: 0
NAUSEA: 0
VOMITING: 0
WHEEZING: 0
EYE PAIN: 0
CONSTIPATION: 0
CHEST TIGHTNESS: 0
BLOOD IN STOOL: 0
APNEA: 0
ABDOMINAL PAIN: 0
COUGH: 0
CHOKING: 0
DIARRHEA: 0

## 2024-02-03 NOTE — PROGRESS NOTES
01/29/2024 111.2  mg/dL Final   • Sodium 01/29/2024 145  136 - 145 mmol/L Final   • Potassium 01/29/2024 4.1  3.5 - 5.1 mmol/L Final   • Chloride 01/29/2024 104  99 - 110 mmol/L Final   • CO2 01/29/2024 27  21 - 32 mmol/L Final   • Anion Gap 01/29/2024 14  3 - 16 Final   • Glucose 01/29/2024 88  70 - 99 mg/dL Final   • BUN 01/29/2024 10  7 - 20 mg/dL Final   • Creatinine 01/29/2024 0.7 (L)  0.9 - 1.3 mg/dL Final   • Est, Glom Filt Rate 01/29/2024 >60  >60 Final    Comment: Pediatric calculator link  https://www.kidney.org/professionals/kdoqi/gfr_calculatorped  Effective Oct 3, 2022  These results are not intended for use in patients  <18 years of age.  eGFR results are calculated without  a race factor using the 2021 CKD-EPI equation.  Careful  clinical correlation is recommended, particularly when  comparing to results calculated using previous equations.  The CKD-EPI equation is less accurate in patients with  extremes of muscle mass, extra-renal metabolism of  creatinine, excessive creatinine ingestion, or following  therapy that affects renal tubular secretion.     • Calcium 01/29/2024 9.3  8.3 - 10.6 mg/dL Final   • Total Protein 01/29/2024 7.4  6.4 - 8.2 g/dL Final   • Albumin 01/29/2024 4.3  3.4 - 5.0 g/dL Final   • Albumin/Globulin Ratio 01/29/2024 1.4  1.1 - 2.2 Final   • Total Bilirubin 01/29/2024 0.4  0.0 - 1.0 mg/dL Final   • Alkaline Phosphatase 01/29/2024 102  40 - 129 U/L Final   • ALT 01/29/2024 26  10 - 40 U/L Final   • AST 01/29/2024 19  15 - 37 U/L Final         Assessment and Plan:  1. Morbid obesity with BMI of 45.0-49.9, adult (HCC)  Discussed risks, benefits and alternatives of Zebound. Patient meets BMI criteria, denies any personal or family history of medullary thyroid cancer, denies a history of multiple endocrine neoplasia syndrome type II, pancreatitis, alcoholism, high blood triglyceride levels.       Counseled patient on proper use and potential side effects including nausea, vomiting,

## 2024-03-06 ENCOUNTER — TELEMEDICINE (OUTPATIENT)
Dept: BARIATRICS/WEIGHT MGMT | Age: 44
End: 2024-03-06
Payer: COMMERCIAL

## 2024-03-06 DIAGNOSIS — E66.01 MORBID OBESITY WITH BMI OF 45.0-49.9, ADULT (HCC): Primary | ICD-10-CM

## 2024-03-06 DIAGNOSIS — Z71.3 DIETARY COUNSELING AND SURVEILLANCE: ICD-10-CM

## 2024-03-06 PROCEDURE — 99213 OFFICE O/P EST LOW 20 MIN: CPT | Performed by: FAMILY MEDICINE

## 2024-03-06 ASSESSMENT — ENCOUNTER SYMPTOMS
PHOTOPHOBIA: 0
EYE PAIN: 0
ABDOMINAL DISTENTION: 0
APNEA: 0
BLOOD IN STOOL: 0
COUGH: 0
NAUSEA: 0
CHOKING: 0
VOMITING: 0
DIARRHEA: 0
CHEST TIGHTNESS: 0
WHEEZING: 0
SHORTNESS OF BREATH: 0
ABDOMINAL PAIN: 0
CONSTIPATION: 0

## 2024-03-06 NOTE — PROGRESS NOTES
01/29/2024 104  99 - 110 mmol/L Final    CO2 01/29/2024 27  21 - 32 mmol/L Final    Anion Gap 01/29/2024 14  3 - 16 Final    Glucose 01/29/2024 88  70 - 99 mg/dL Final    BUN 01/29/2024 10  7 - 20 mg/dL Final    Creatinine 01/29/2024 0.7 (L)  0.9 - 1.3 mg/dL Final    Est, Glom Filt Rate 01/29/2024 >60  >60 Final    Comment: Pediatric calculator link  https://www.kidney.org/professionals/kdoqi/gfr_calculatorped  Effective Oct 3, 2022  These results are not intended for use in patients  <18 years of age.  eGFR results are calculated without  a race factor using the 2021 CKD-EPI equation.  Careful  clinical correlation is recommended, particularly when  comparing to results calculated using previous equations.  The CKD-EPI equation is less accurate in patients with  extremes of muscle mass, extra-renal metabolism of  creatinine, excessive creatinine ingestion, or following  therapy that affects renal tubular secretion.      Calcium 01/29/2024 9.3  8.3 - 10.6 mg/dL Final    Total Protein 01/29/2024 7.4  6.4 - 8.2 g/dL Final    Albumin 01/29/2024 4.3  3.4 - 5.0 g/dL Final    Albumin/Globulin Ratio 01/29/2024 1.4  1.1 - 2.2 Final    Total Bilirubin 01/29/2024 0.4  0.0 - 1.0 mg/dL Final    Alkaline Phosphatase 01/29/2024 102  40 - 129 U/L Final    ALT 01/29/2024 26  10 - 40 U/L Final    AST 01/29/2024 19  15 - 37 U/L Final         Assessment and Plan:  1. Morbid obesity with BMI of 45.0-49.9, adult (HCC)  Stable, not at goal.  Start Zepbound as prescribed.  F/u 4 weeks.  In-office weight check before next visit.     2. Dietary counseling and surveillance  Avoid skipping meals.   Protein with every meal and snack.          Nutrition Plan: [] LCHF/Ketogenic   [x] Modified low-calorie diet (low carb/low-tatiana)               [] Low-calorie diet    [] Maintenance       []Other        Exercise: [x] Cardio     [x] Resistance/strength training                       [x] ACSM recommendations (150 minutes/week in active weight loss)

## 2024-03-08 ENCOUNTER — TELEPHONE (OUTPATIENT)
Dept: BARIATRICS/WEIGHT MGMT | Age: 44
End: 2024-03-08

## 2024-03-11 ENCOUNTER — HOSPITAL ENCOUNTER (INPATIENT)
Age: 44
LOS: 2 days | Discharge: HOME | DRG: 153 | End: 2024-03-13
Attending: EMERGENCY MEDICINE | Admitting: INTERNAL MEDICINE
Payer: COMMERCIAL

## 2024-03-11 ENCOUNTER — APPOINTMENT (OUTPATIENT)
Dept: CT IMAGING | Age: 44
DRG: 153 | End: 2024-03-11
Payer: COMMERCIAL

## 2024-03-11 DIAGNOSIS — J36 PERITONSILLAR ABSCESS: Primary | ICD-10-CM

## 2024-03-11 LAB
ANION GAP SERPL CALCULATED.3IONS-SCNC: 10 MMOL/L (ref 3–16)
BASOPHILS # BLD: 0.2 K/UL (ref 0–0.2)
BASOPHILS NFR BLD: 1.3 %
BUN SERPL-MCNC: 10 MG/DL (ref 7–20)
CALCIUM SERPL-MCNC: 9 MG/DL (ref 8.3–10.6)
CHLORIDE SERPL-SCNC: 99 MMOL/L (ref 99–110)
CO2 SERPL-SCNC: 26 MMOL/L (ref 21–32)
CREAT SERPL-MCNC: 0.6 MG/DL (ref 0.9–1.3)
DEPRECATED RDW RBC AUTO: 13.3 % (ref 12.4–15.4)
EOSINOPHIL # BLD: 0.4 K/UL (ref 0–0.6)
EOSINOPHIL NFR BLD: 2.6 %
GFR SERPLBLD CREATININE-BSD FMLA CKD-EPI: >60 ML/MIN/{1.73_M2}
GLUCOSE SERPL-MCNC: 87 MG/DL (ref 70–99)
HCT VFR BLD AUTO: 42.5 % (ref 40.5–52.5)
HETEROPH AB BLD QL IA: NEGATIVE
HGB BLD-MCNC: 14.7 G/DL (ref 13.5–17.5)
LYMPHOCYTES # BLD: 2.1 K/UL (ref 1–5.1)
LYMPHOCYTES NFR BLD: 13.7 %
MCH RBC QN AUTO: 29.8 PG (ref 26–34)
MCHC RBC AUTO-ENTMCNC: 34.6 G/DL (ref 31–36)
MCV RBC AUTO: 85.9 FL (ref 80–100)
MONOCYTES # BLD: 0.9 K/UL (ref 0–1.3)
MONOCYTES NFR BLD: 6.2 %
NEUTROPHILS # BLD: 11.5 K/UL (ref 1.7–7.7)
NEUTROPHILS NFR BLD: 76.2 %
PLATELET # BLD AUTO: 362 K/UL (ref 135–450)
PMV BLD AUTO: 7.9 FL (ref 5–10.5)
POTASSIUM SERPL-SCNC: 4.1 MMOL/L (ref 3.5–5.1)
RBC # BLD AUTO: 4.95 M/UL (ref 4.2–5.9)
SODIUM SERPL-SCNC: 135 MMOL/L (ref 136–145)
WBC # BLD AUTO: 15.1 K/UL (ref 4–11)

## 2024-03-11 PROCEDURE — 85025 COMPLETE CBC W/AUTO DIFF WBC: CPT

## 2024-03-11 PROCEDURE — 80048 BASIC METABOLIC PNL TOTAL CA: CPT

## 2024-03-11 PROCEDURE — 99285 EMERGENCY DEPT VISIT HI MDM: CPT

## 2024-03-11 PROCEDURE — 2580000003 HC RX 258: Performed by: INTERNAL MEDICINE

## 2024-03-11 PROCEDURE — 6360000004 HC RX CONTRAST MEDICATION: Performed by: EMERGENCY MEDICINE

## 2024-03-11 PROCEDURE — 6360000002 HC RX W HCPCS: Performed by: EMERGENCY MEDICINE

## 2024-03-11 PROCEDURE — 86308 HETEROPHILE ANTIBODY SCREEN: CPT

## 2024-03-11 PROCEDURE — 96374 THER/PROPH/DIAG INJ IV PUSH: CPT

## 2024-03-11 PROCEDURE — 6360000002 HC RX W HCPCS: Performed by: INTERNAL MEDICINE

## 2024-03-11 PROCEDURE — 1200000000 HC SEMI PRIVATE

## 2024-03-11 PROCEDURE — 2580000003 HC RX 258: Performed by: EMERGENCY MEDICINE

## 2024-03-11 PROCEDURE — 96375 TX/PRO/DX INJ NEW DRUG ADDON: CPT

## 2024-03-11 PROCEDURE — 70491 CT SOFT TISSUE NECK W/DYE: CPT

## 2024-03-11 RX ORDER — DEXAMETHASONE SODIUM PHOSPHATE 4 MG/ML
10 INJECTION, SOLUTION INTRA-ARTICULAR; INTRALESIONAL; INTRAMUSCULAR; INTRAVENOUS; SOFT TISSUE ONCE
Status: COMPLETED | OUTPATIENT
Start: 2024-03-11 | End: 2024-03-11

## 2024-03-11 RX ORDER — SODIUM CHLORIDE 9 MG/ML
INJECTION, SOLUTION INTRAVENOUS PRN
Status: DISCONTINUED | OUTPATIENT
Start: 2024-03-11 | End: 2024-03-13 | Stop reason: HOSPADM

## 2024-03-11 RX ORDER — BUPROPION HYDROCHLORIDE 150 MG/1
300 TABLET ORAL EVERY MORNING
Status: DISCONTINUED | OUTPATIENT
Start: 2024-03-12 | End: 2024-03-13 | Stop reason: HOSPADM

## 2024-03-11 RX ORDER — SODIUM CHLORIDE 0.9 % (FLUSH) 0.9 %
5-40 SYRINGE (ML) INJECTION PRN
Status: DISCONTINUED | OUTPATIENT
Start: 2024-03-11 | End: 2024-03-13 | Stop reason: HOSPADM

## 2024-03-11 RX ORDER — KETOROLAC TROMETHAMINE 30 MG/ML
30 INJECTION, SOLUTION INTRAMUSCULAR; INTRAVENOUS ONCE
Status: COMPLETED | OUTPATIENT
Start: 2024-03-11 | End: 2024-03-11

## 2024-03-11 RX ORDER — NAPROXEN 250 MG/1
500 TABLET ORAL 2 TIMES DAILY PRN
Status: DISCONTINUED | OUTPATIENT
Start: 2024-03-11 | End: 2024-03-13 | Stop reason: HOSPADM

## 2024-03-11 RX ORDER — AMOXICILLIN 500 MG/1
500 CAPSULE ORAL 2 TIMES DAILY
Status: ON HOLD | COMMUNITY
Start: 2024-03-10 | End: 2024-03-13 | Stop reason: HOSPADM

## 2024-03-11 RX ORDER — ACETAMINOPHEN 500 MG
1000 TABLET ORAL 3 TIMES DAILY PRN
Status: DISCONTINUED | OUTPATIENT
Start: 2024-03-11 | End: 2024-03-13 | Stop reason: HOSPADM

## 2024-03-11 RX ORDER — CITALOPRAM HYDROBROMIDE 10 MG/1
40 TABLET ORAL DAILY
Status: DISCONTINUED | OUTPATIENT
Start: 2024-03-12 | End: 2024-03-13 | Stop reason: HOSPADM

## 2024-03-11 RX ORDER — SODIUM CHLORIDE 0.9 % (FLUSH) 0.9 %
5-40 SYRINGE (ML) INJECTION EVERY 12 HOURS SCHEDULED
Status: DISCONTINUED | OUTPATIENT
Start: 2024-03-11 | End: 2024-03-13 | Stop reason: HOSPADM

## 2024-03-11 RX ORDER — HYDROCODONE BITARTRATE AND ACETAMINOPHEN 5; 325 MG/1; MG/1
1 TABLET ORAL EVERY 6 HOURS PRN
Status: DISCONTINUED | OUTPATIENT
Start: 2024-03-11 | End: 2024-03-13 | Stop reason: HOSPADM

## 2024-03-11 RX ORDER — DEXAMETHASONE SODIUM PHOSPHATE 4 MG/ML
8 INJECTION, SOLUTION INTRA-ARTICULAR; INTRALESIONAL; INTRAMUSCULAR; INTRAVENOUS; SOFT TISSUE EVERY 8 HOURS
Status: DISCONTINUED | OUTPATIENT
Start: 2024-03-12 | End: 2024-03-13 | Stop reason: HOSPADM

## 2024-03-11 RX ORDER — POLYETHYLENE GLYCOL 3350 17 G/17G
17 POWDER, FOR SOLUTION ORAL DAILY PRN
Status: DISCONTINUED | OUTPATIENT
Start: 2024-03-11 | End: 2024-03-13 | Stop reason: HOSPADM

## 2024-03-11 RX ORDER — ENOXAPARIN SODIUM 100 MG/ML
40 INJECTION SUBCUTANEOUS 2 TIMES DAILY
Status: DISCONTINUED | OUTPATIENT
Start: 2024-03-11 | End: 2024-03-13 | Stop reason: HOSPADM

## 2024-03-11 RX ADMIN — DEXAMETHASONE SODIUM PHOSPHATE 10 MG: 4 INJECTION, SOLUTION INTRAMUSCULAR; INTRAVENOUS at 17:44

## 2024-03-11 RX ADMIN — AMPICILLIN SODIUM AND SULBACTAM SODIUM 3000 MG: 2; 1 INJECTION, POWDER, FOR SOLUTION INTRAMUSCULAR; INTRAVENOUS at 23:24

## 2024-03-11 RX ADMIN — KETOROLAC TROMETHAMINE 30 MG: 30 INJECTION, SOLUTION INTRAMUSCULAR at 19:03

## 2024-03-11 RX ADMIN — IOPAMIDOL 75 ML: 755 INJECTION, SOLUTION INTRAVENOUS at 17:04

## 2024-03-11 RX ADMIN — AMPICILLIN SODIUM AND SULBACTAM SODIUM 3000 MG: 2; 1 INJECTION, POWDER, FOR SOLUTION INTRAMUSCULAR; INTRAVENOUS at 19:07

## 2024-03-11 RX ADMIN — Medication 10 ML: at 21:28

## 2024-03-11 ASSESSMENT — PAIN SCALES - GENERAL
PAINLEVEL_OUTOF10: 0
PAINLEVEL_OUTOF10: 7
PAINLEVEL_OUTOF10: 7

## 2024-03-11 ASSESSMENT — PAIN DESCRIPTION - DESCRIPTORS
DESCRIPTORS: SHARP
DESCRIPTORS: ACHING;SHARP

## 2024-03-11 ASSESSMENT — PAIN DESCRIPTION - ORIENTATION
ORIENTATION: RIGHT
ORIENTATION: RIGHT

## 2024-03-11 ASSESSMENT — PAIN DESCRIPTION - PAIN TYPE: TYPE: ACUTE PAIN

## 2024-03-11 ASSESSMENT — PAIN DESCRIPTION - LOCATION
LOCATION: THROAT
LOCATION: THROAT;EAR

## 2024-03-11 ASSESSMENT — PAIN DESCRIPTION - ONSET: ONSET: ON-GOING

## 2024-03-11 ASSESSMENT — PAIN - FUNCTIONAL ASSESSMENT
PAIN_FUNCTIONAL_ASSESSMENT: ACTIVITIES ARE NOT PREVENTED
PAIN_FUNCTIONAL_ASSESSMENT: PREVENTS OR INTERFERES SOME ACTIVE ACTIVITIES AND ADLS

## 2024-03-11 ASSESSMENT — PAIN DESCRIPTION - FREQUENCY: FREQUENCY: CONTINUOUS

## 2024-03-11 NOTE — ED PROVIDER NOTES
[facility]     EMERGENCY DEPARTMENT ENCOUNTER            Pt Name: Flip Hassan   MRN: [id number]   Birthdate 1980   Date of evaluation: 3/11/2024   Provider: Regulo Aranda MD   PCP: Robbin Kyae MD   Note Started: 6:07 PM EDT 3/11/24          CHIEF COMPLAINT     Chief Complaint   Patient presents with    Otalgia    Tonsil Swelling           HISTORY OF PRESENT ILLNESS:   History from : Patient and PCP    Limitations to history : None     Flip Hassan is a 43 y.o. male who presents per recommendation of his primary care physician for evaluation of tonsillar swelling that is worse on the right than the left has been present over the past few days.  Patient states that he initially developed a sore throat 4 days ago and went to urgent care yesterday.  Patient states that he had a strep swab that was negative urgent care was started on amoxicillin which she has been taking.  Patient states that he saw his primary care physician in the office today for continued sore throat and was sent to the ED for evaluation of possible peritonsillar abscess.    Nursing Notes were all reviewed and agreed with, or any disagreements were addressed in the HPI.     REVIEW OF SYSTEMS :    Positives and Pertinent negatives as per HPI.      MEDICAL HISTORY   has a past medical history of Anemia, Anxiety, Chronic GERD, Depression, Influenza A (03/27/2017), Kidney stones, Prostatitis, Rectal bleeding, Ulcerative colitis (HCC), and Unspecified sleep apnea.    Past Surgical History:   Procedure Laterality Date    ABDOMEN SURGERY Bilateral 1985    hernia repair    COLONOSCOPY  2009    tics, ulcerative colitis    COLONOSCOPY  03/2014    O'Vasyl-polyp x 2, diverticulosis    COLONOSCOPY N/A 01/25/2024    O'Vasyl- no polyps, Moderate left sided and mild right sided diverticulosis, repeat in 5 years    SLEEVE GASTRECTOMY N/A 07/06/2021    [facility]    UPPER GASTROINTESTINAL ENDOSCOPY N/A 09/14/2020    EGD BIOPSY

## 2024-03-11 NOTE — ED TRIAGE NOTES
Patient to the ER with complaints of right sided throat and right sided ear pain that has been getting worse x3 days.  Patient was seen at [facility] yesterday and tested negative for strep.   He followed up with PCP Dr. Kaye who saw severe swelling in his R tonsil and sent him here for possible drainage.

## 2024-03-12 PROBLEM — D17.1 LIPOMA OF ABDOMINAL WALL: Status: RESOLVED | Noted: 2021-02-25 | Resolved: 2024-03-12

## 2024-03-12 PROBLEM — R22.42: Status: RESOLVED | Noted: 2020-01-30 | Resolved: 2024-03-12

## 2024-03-12 PROBLEM — J02.9 PHARYNGITIS: Status: ACTIVE | Noted: 2024-03-12

## 2024-03-12 PROBLEM — J32.2 CHRONIC ETHMOIDAL SINUSITIS: Status: ACTIVE | Noted: 2024-03-12

## 2024-03-12 PROBLEM — M79.605 PAIN OF LEFT LEG: Status: RESOLVED | Noted: 2020-01-30 | Resolved: 2024-03-12

## 2024-03-12 LAB
ANION GAP SERPL CALCULATED.3IONS-SCNC: 9 MMOL/L (ref 3–16)
BASOPHILS # BLD: 0.1 K/UL (ref 0–0.2)
BASOPHILS NFR BLD: 0.6 %
BUN SERPL-MCNC: 15 MG/DL (ref 7–20)
CALCIUM SERPL-MCNC: 9 MG/DL (ref 8.3–10.6)
CHLORIDE SERPL-SCNC: 105 MMOL/L (ref 99–110)
CO2 SERPL-SCNC: 23 MMOL/L (ref 21–32)
CREAT SERPL-MCNC: 0.7 MG/DL (ref 0.9–1.3)
DEPRECATED RDW RBC AUTO: 13.2 % (ref 12.4–15.4)
EOSINOPHIL # BLD: 0 K/UL (ref 0–0.6)
EOSINOPHIL NFR BLD: 0 %
GFR SERPLBLD CREATININE-BSD FMLA CKD-EPI: >60 ML/MIN/{1.73_M2}
GLUCOSE SERPL-MCNC: 161 MG/DL (ref 70–99)
HCT VFR BLD AUTO: 43.8 % (ref 40.5–52.5)
HGB BLD-MCNC: 14.7 G/DL (ref 13.5–17.5)
LYMPHOCYTES # BLD: 1.1 K/UL (ref 1–5.1)
LYMPHOCYTES NFR BLD: 6.7 %
MCH RBC QN AUTO: 29.2 PG (ref 26–34)
MCHC RBC AUTO-ENTMCNC: 33.6 G/DL (ref 31–36)
MCV RBC AUTO: 86.8 FL (ref 80–100)
MONOCYTES # BLD: 0.2 K/UL (ref 0–1.3)
MONOCYTES NFR BLD: 1.1 %
NEUTROPHILS # BLD: 15.4 K/UL (ref 1.7–7.7)
NEUTROPHILS NFR BLD: 91.6 %
PLATELET # BLD AUTO: 398 K/UL (ref 135–450)
PMV BLD AUTO: 8.2 FL (ref 5–10.5)
POTASSIUM SERPL-SCNC: 4.7 MMOL/L (ref 3.5–5.1)
RBC # BLD AUTO: 5.05 M/UL (ref 4.2–5.9)
SODIUM SERPL-SCNC: 137 MMOL/L (ref 136–145)
WBC # BLD AUTO: 16.9 K/UL (ref 4–11)

## 2024-03-12 PROCEDURE — 6370000000 HC RX 637 (ALT 250 FOR IP): Performed by: INTERNAL MEDICINE

## 2024-03-12 PROCEDURE — 36415 COLL VENOUS BLD VENIPUNCTURE: CPT

## 2024-03-12 PROCEDURE — 5A09357 ASSISTANCE WITH RESPIRATORY VENTILATION, LESS THAN 24 CONSECUTIVE HOURS, CONTINUOUS POSITIVE AIRWAY PRESSURE: ICD-10-PCS | Performed by: INTERNAL MEDICINE

## 2024-03-12 PROCEDURE — 85025 COMPLETE CBC W/AUTO DIFF WBC: CPT

## 2024-03-12 PROCEDURE — 80048 BASIC METABOLIC PNL TOTAL CA: CPT

## 2024-03-12 PROCEDURE — 2580000003 HC RX 258: Performed by: INTERNAL MEDICINE

## 2024-03-12 PROCEDURE — 94660 CPAP INITIATION&MGMT: CPT

## 2024-03-12 PROCEDURE — 94760 N-INVAS EAR/PLS OXIMETRY 1: CPT

## 2024-03-12 PROCEDURE — 6360000002 HC RX W HCPCS: Performed by: INTERNAL MEDICINE

## 2024-03-12 PROCEDURE — 99222 1ST HOSP IP/OBS MODERATE 55: CPT | Performed by: STUDENT IN AN ORGANIZED HEALTH CARE EDUCATION/TRAINING PROGRAM

## 2024-03-12 PROCEDURE — 99235 HOSP IP/OBS SAME DATE MOD 70: CPT | Performed by: INTERNAL MEDICINE

## 2024-03-12 PROCEDURE — 1200000000 HC SEMI PRIVATE

## 2024-03-12 RX ORDER — FAMOTIDINE 20 MG/1
20 TABLET, FILM COATED ORAL 2 TIMES DAILY
Status: DISCONTINUED | OUTPATIENT
Start: 2024-03-12 | End: 2024-03-13 | Stop reason: HOSPADM

## 2024-03-12 RX ADMIN — BUPROPION HYDROCHLORIDE 300 MG: 150 TABLET, EXTENDED RELEASE ORAL at 09:11

## 2024-03-12 RX ADMIN — DEXAMETHASONE SODIUM PHOSPHATE 8 MG: 4 INJECTION INTRA-ARTICULAR; INTRALESIONAL; INTRAMUSCULAR; INTRAVENOUS; SOFT TISSUE at 01:50

## 2024-03-12 RX ADMIN — AMPICILLIN SODIUM AND SULBACTAM SODIUM 3000 MG: 2; 1 INJECTION, POWDER, FOR SOLUTION INTRAMUSCULAR; INTRAVENOUS at 23:42

## 2024-03-12 RX ADMIN — AMPICILLIN SODIUM AND SULBACTAM SODIUM 3000 MG: 2; 1 INJECTION, POWDER, FOR SOLUTION INTRAMUSCULAR; INTRAVENOUS at 12:13

## 2024-03-12 RX ADMIN — FAMOTIDINE 20 MG: 20 TABLET, FILM COATED ORAL at 21:39

## 2024-03-12 RX ADMIN — DEXAMETHASONE SODIUM PHOSPHATE 8 MG: 4 INJECTION INTRA-ARTICULAR; INTRALESIONAL; INTRAMUSCULAR; INTRAVENOUS; SOFT TISSUE at 09:11

## 2024-03-12 RX ADMIN — DEXAMETHASONE SODIUM PHOSPHATE 8 MG: 4 INJECTION INTRA-ARTICULAR; INTRALESIONAL; INTRAMUSCULAR; INTRAVENOUS; SOFT TISSUE at 17:48

## 2024-03-12 RX ADMIN — AMPICILLIN SODIUM AND SULBACTAM SODIUM 3000 MG: 2; 1 INJECTION, POWDER, FOR SOLUTION INTRAMUSCULAR; INTRAVENOUS at 05:28

## 2024-03-12 RX ADMIN — CITALOPRAM HYDROBROMIDE 40 MG: 10 TABLET ORAL at 09:11

## 2024-03-12 RX ADMIN — Medication 10 ML: at 09:11

## 2024-03-12 RX ADMIN — AMPICILLIN SODIUM AND SULBACTAM SODIUM 3000 MG: 2; 1 INJECTION, POWDER, FOR SOLUTION INTRAMUSCULAR; INTRAVENOUS at 17:51

## 2024-03-12 ASSESSMENT — PAIN SCALES - GENERAL
PAINLEVEL_OUTOF10: 0

## 2024-03-12 NOTE — PROGRESS NOTES
Medication Reconciliation    List of medications patient is currently taking is complete.     Source of information: 1. Conversation with patient at bedside                                      2. EPIC records      Allergies  Patient has no known allergies.

## 2024-03-12 NOTE — H&P
[facility]  History and Physical      CHIEF COMPLAINT:  My throat hurts    History of Present Illness:    This is a 43-year-old white male with a history of anxiety, depression, obesity who presented to the outpatient office for evaluation yesterday for severe sore throat.  The patient stated that he had started with a significant sore throat over the prior 3-4 days and had gone to urgent care the day before.  He states he was swabbed for strep, this was negative and he was sent home on amoxicillin.  Over the next 24 hours the patient had significant worsening of his sore throat and when he presented to the emergency room yesterday he was having a very difficult time swallowing anything, he could not open his mouth without significant oropharyngeal and neck pain.  In the outpatient office he was noted to have substantial enlargement of the right tonsillar pillar with encroachment upon his uvula.  He had tender adenopathy of his right upper anterior cervical lymph nodes.    He was directed to the emergency room for evaluation for possible peritonsillar abscess.  Patient denied cough.  He had no fever.  No nausea or vomiting or diarrhea.  No abdominal pain.  He denied any sick contacts.        Past Medical History:   Diagnosis Date    Anemia     Anxiety     Chronic GERD     Depression     Influenza A 03/27/2017    Kidney stones     Prostatitis     Rectal bleeding     intermittently    Ulcerative colitis (HCC)     Unspecified sleep apnea     USES CPAP         Past Surgical History:   Procedure Laterality Date    ABDOMEN SURGERY Bilateral 1985    hernia repair    COLONOSCOPY  2009    tics, ulcerative colitis    COLONOSCOPY  03/2014    O'Dougherty-polyp x 2, diverticulosis    COLONOSCOPY N/A 01/25/2024    O'Vasyl- no polyps, Moderate left sided and mild right sided diverticulosis, repeat in 5 years    SLEEVE GASTRECTOMY N/A 07/06/2021    [facility]    UPPER GASTROINTESTINAL ENDOSCOPY N/A 09/14/2020    EGD BIOPSY

## 2024-03-12 NOTE — FLOWSHEET NOTE
4 Eyes Skin Assessment     NAME:  Flip Hassan  YOB: 1980  MEDICAL RECORD NUMBER:  [id number]    The patient is being assessed for  Admission    I agree that at least one RN has performed a thorough Head to Toe Skin Assessment on the patient. ALL assessment sites listed below have been assessed.      Areas assessed by both nurses:    Head, Face, Ears, Shoulders, Back, Chest, Arms, Elbows, Hands, Sacrum. Buttock, Coccyx, Ischium, Legs. Feet and Heels, and Under Medical Devices         Does the Patient have a Wound? No noted wound(s)       Landon Prevention initiated by RN: No  Wound Care Orders initiated by RN: No    Pressure Injury (Stage 3,4, Unstageable, DTI, NWPT, and Complex wounds) if present, place Wound referral order by RN under : No    New Ostomies, if present place, Ostomy referral order under : No     Nurse 1 eSignature: Electronically signed by Maricruz Nielsen RN on 3/11/24 at 11:36 PM EDT    **SHARE this note so that the co-signing nurse can place an eSignature**    Nurse 2 eSignature: Electronically signed by Dennys Casarez RN on 3/12/24 at 12:31 AM EDT

## 2024-03-12 NOTE — CONSULTS
lymphadenopathy, no neck masses, no crepitus  CHEST: Normal respiratory effort, no retractions, breathing comfortably  SKIN: No rashes, normal appearing skin, no evidence of skin lesions/tumors    LABS  Lab Results   Component Value Date    WBC 16.9 (H) 03/12/2024    HGB 14.7 03/12/2024    HCT 43.8 03/12/2024    MCV 86.8 03/12/2024     03/12/2024           RADIOLOGY  Summary of findings:  I dependently reviewed and interpreted the patient's CT neck scan ordered by another provider.  Shows evidence of bilateral tonsillitis more prominent on the right side with extension along the posterior pharyngeal wall down to the vallecula.  There is reactive adenopathy.  There is also evidence of ethmoid sinusitis.    PROCEDURE           Summary of findings:      ASSESSMENT/PLAN  Flip is a very pleasant 43 y.o. male with pharyngitis/tonsillitis    -Significant improvement in symptoms since starting IV antibiotics.  Discussed with the patient that ideally we would treat him with 24 to 48 hours of antibiotics.  Would recommend that he then complete a 10-day course of Augmentin at discharge.  -Okay with Medrol Dosepak at discharge  -Discussed with patient that we need to have close follow-up to ensure that this does not progress.  Fortunately, he does not have any evidence of an abscess on exam.           I have performed a head and neck physical exam personally or was physically present during the key or critical portions of the service.    Medical Decision Making:  The following items were considered in medical decision making:  Independent review of images  Review / order clinical lab tests  Review / order radiology tests  Decision to obtain old records  Review and summation [facility] (a summary of my findings in these old records: )

## 2024-03-12 NOTE — PROGRESS NOTES
Patient A/O x4. VSS on room air. Denies pain. Able to eat and drink without difficultly. Morning medication administered. Patient updated on POC. Answered all questions. Independent in the room. Call light within reach. No other needs at this time.

## 2024-03-12 NOTE — CARE COORDINATION
Discharge Planning:      (CM) reviewed the patient's chart to assess needs. Patient's Readmission Risk Score is   . Patient's medical insurance is  Payor: BCBS / Plan: BCBS OUT OF STATE / Product Type: *No Product type* / .  Patient's PCP is Robbin Kaye MD .  No needs anticipated, at this time. CM team to follow. Staff to inform CM if additional discharge needs arise.    Pts preferred pharmacy is   [facility] [employer] [id number] [address] - P [phone] - F [phone] [[id number]]     Electronically signed by Gaviota Hunter RN on 3/12/2024 at 9:09 AM

## 2024-03-12 NOTE — PLAN OF CARE
Problem: Discharge Planning  Goal: Discharge to home or other facility with appropriate resources  Outcome: Progressing  Flowsheets (Taken 3/12/2024 1000)  Discharge to home or other facility with appropriate resources: Identify barriers to discharge with patient and caregiver     Problem: Pain  Goal: Verbalizes/displays adequate comfort level or baseline comfort level  3/12/2024 1001 by Era Oviedo, RN  Outcome: Progressing  3/12/2024 0040 by Maricruz Nielsen, RN  Outcome: Progressing

## 2024-03-13 VITALS
BODY MASS INDEX: 40.43 KG/M2 | TEMPERATURE: 97.5 F | HEART RATE: 72 BPM | OXYGEN SATURATION: 97 % | HEIGHT: 74 IN | DIASTOLIC BLOOD PRESSURE: 78 MMHG | RESPIRATION RATE: 18 BRPM | WEIGHT: 315 LBS | SYSTOLIC BLOOD PRESSURE: 129 MMHG

## 2024-03-13 LAB
ANION GAP SERPL CALCULATED.3IONS-SCNC: 9 MMOL/L (ref 3–16)
BASOPHILS # BLD: 0.1 K/UL (ref 0–0.2)
BASOPHILS NFR BLD: 0.2 %
BUN SERPL-MCNC: 15 MG/DL (ref 7–20)
CALCIUM SERPL-MCNC: 9.1 MG/DL (ref 8.3–10.6)
CHLORIDE SERPL-SCNC: 106 MMOL/L (ref 99–110)
CO2 SERPL-SCNC: 24 MMOL/L (ref 21–32)
CREAT SERPL-MCNC: 0.6 MG/DL (ref 0.9–1.3)
DEPRECATED RDW RBC AUTO: 13.3 % (ref 12.4–15.4)
EOSINOPHIL # BLD: 0 K/UL (ref 0–0.6)
EOSINOPHIL NFR BLD: 0 %
GFR SERPLBLD CREATININE-BSD FMLA CKD-EPI: >60 ML/MIN/{1.73_M2}
GLUCOSE SERPL-MCNC: 199 MG/DL (ref 70–99)
HCT VFR BLD AUTO: 42.4 % (ref 40.5–52.5)
HGB BLD-MCNC: 14.3 G/DL (ref 13.5–17.5)
LYMPHOCYTES # BLD: 1.8 K/UL (ref 1–5.1)
LYMPHOCYTES NFR BLD: 6.1 %
MCH RBC QN AUTO: 29.4 PG (ref 26–34)
MCHC RBC AUTO-ENTMCNC: 33.6 G/DL (ref 31–36)
MCV RBC AUTO: 87.4 FL (ref 80–100)
MONOCYTES # BLD: 0.8 K/UL (ref 0–1.3)
MONOCYTES NFR BLD: 2.7 %
NEUTROPHILS # BLD: 27.5 K/UL (ref 1.7–7.7)
NEUTROPHILS NFR BLD: 91 %
PLATELET # BLD AUTO: 409 K/UL (ref 135–450)
PMV BLD AUTO: 8.3 FL (ref 5–10.5)
POTASSIUM SERPL-SCNC: 4.4 MMOL/L (ref 3.5–5.1)
RBC # BLD AUTO: 4.85 M/UL (ref 4.2–5.9)
SODIUM SERPL-SCNC: 139 MMOL/L (ref 136–145)
WBC # BLD AUTO: 30.2 K/UL (ref 4–11)

## 2024-03-13 PROCEDURE — 85025 COMPLETE CBC W/AUTO DIFF WBC: CPT

## 2024-03-13 PROCEDURE — 36415 COLL VENOUS BLD VENIPUNCTURE: CPT

## 2024-03-13 PROCEDURE — 6360000002 HC RX W HCPCS: Performed by: INTERNAL MEDICINE

## 2024-03-13 PROCEDURE — 99238 HOSP IP/OBS DSCHRG MGMT 30/<: CPT | Performed by: INTERNAL MEDICINE

## 2024-03-13 PROCEDURE — 80048 BASIC METABOLIC PNL TOTAL CA: CPT

## 2024-03-13 PROCEDURE — 6370000000 HC RX 637 (ALT 250 FOR IP): Performed by: INTERNAL MEDICINE

## 2024-03-13 PROCEDURE — 2580000003 HC RX 258: Performed by: INTERNAL MEDICINE

## 2024-03-13 RX ORDER — METHYLPREDNISOLONE 4 MG/1
TABLET ORAL
Qty: 1 KIT | Refills: 0 | Status: SHIPPED | OUTPATIENT
Start: 2024-03-13 | End: 2024-03-19

## 2024-03-13 RX ORDER — AMOXICILLIN AND CLAVULANATE POTASSIUM 875; 125 MG/1; MG/1
1 TABLET, FILM COATED ORAL 2 TIMES DAILY
Qty: 20 TABLET | Refills: 0 | Status: SHIPPED | OUTPATIENT
Start: 2024-03-13 | End: 2024-03-23

## 2024-03-13 RX ADMIN — FAMOTIDINE 20 MG: 20 TABLET, FILM COATED ORAL at 08:56

## 2024-03-13 RX ADMIN — DEXAMETHASONE SODIUM PHOSPHATE 8 MG: 4 INJECTION INTRA-ARTICULAR; INTRALESIONAL; INTRAMUSCULAR; INTRAVENOUS; SOFT TISSUE at 08:56

## 2024-03-13 RX ADMIN — BUPROPION HYDROCHLORIDE 300 MG: 150 TABLET, EXTENDED RELEASE ORAL at 08:56

## 2024-03-13 RX ADMIN — AMPICILLIN SODIUM AND SULBACTAM SODIUM 3000 MG: 2; 1 INJECTION, POWDER, FOR SOLUTION INTRAMUSCULAR; INTRAVENOUS at 06:08

## 2024-03-13 RX ADMIN — CITALOPRAM HYDROBROMIDE 40 MG: 10 TABLET ORAL at 08:56

## 2024-03-13 RX ADMIN — DEXAMETHASONE SODIUM PHOSPHATE 8 MG: 4 INJECTION INTRA-ARTICULAR; INTRALESIONAL; INTRAMUSCULAR; INTRAVENOUS; SOFT TISSUE at 02:50

## 2024-03-13 ASSESSMENT — PAIN SCALES - GENERAL
PAINLEVEL_OUTOF10: 0
PAINLEVEL_OUTOF10: 0

## 2024-03-13 NOTE — PLAN OF CARE
Problem: Discharge Planning  Goal: Discharge to home or other facility with appropriate resources  3/13/2024 1008 by Katelin Thurston, RN  Outcome: Progressing     Problem: Pain  Goal: Verbalizes/displays adequate comfort level or baseline comfort level  3/13/2024 1008 by Katelin Thurston, RN  Outcome: Progressing

## 2024-03-13 NOTE — PROGRESS NOTES
Patient discharged per MD order. Patient given written and verbal discharge instructions and verbalized understanding. Patient declined a wheelchair and walked to the lobby with all belongings to his car and drove himself home.

## 2024-03-13 NOTE — PROGRESS NOTES
Pt continues to receive steroids and antibiotics. States pain has subsided since receiving these medications . Has been able to eat and drink without difficulty . No respiratory distress overnight

## 2024-03-13 NOTE — PROGRESS NOTES
[place] [facility] Note      Chief Complaint: I feel good    Subjective/Interval History:    Patient doing well.  No complaints overnight.  Eating and drinking without difficulty.  He reports that he slept comfortably.  He states he likes the food here.  He feels ready to go home.    No chest pain or shortness breath. No cough or sputum. No nausea, vomiting, diarrhea. No abdominal pain. No dysuria.  The remainder of the review of systems is negative.     PMH, PSH, FH/SH reviewed and unchanged as documented in the H&P personally documented at admission 3/12/24    Medication list reviewed    Objective:    /70   Pulse 72   Temp 98 °F (36.7 °C) (Oral)   Resp 18   Ht 1.88 m (6' 2\")   Wt (!) 175.1 kg (386 lb 0.4 oz)   SpO2 95%   BMI 49.56 kg/m²   Temp  Av.1 °F (36.7 °C)  Min: 98 °F (36.7 °C)  Max: 98.1 °F (36.7 °C)    RRR  Chest-the chest is clear throughout.  No wheezing or rhonchi or crackles noted.  Abd- BS+, soft, ntnd  Ext- no edema    The Following Labs Were Reviewed Today:     Recent Results (from the past 24 hour(s))   Basic Metabolic Panel w/ Reflex to MG    Collection Time: 24  5:37 AM   Result Value Ref Range    Sodium 139 136 - 145 mmol/L    Potassium reflex Magnesium 4.4 3.5 - 5.1 mmol/L    Chloride 106 99 - 110 mmol/L    CO2 24 21 - 32 mmol/L    Anion Gap 9 3 - 16    Glucose 199 (H) 70 - 99 mg/dL    BUN 15 7 - 20 mg/dL    Creatinine 0.6 (L) 0.9 - 1.3 mg/dL    Est, Glom Filt Rate >60 >60    Calcium 9.1 8.3 - 10.6 mg/dL   CBC with Auto Differential    Collection Time: 24  5:38 AM   Result Value Ref Range    WBC 30.2 (H) 4.0 - 11.0 K/uL    RBC 4.85 4.20 - 5.90 M/uL    Hemoglobin 14.3 13.5 - 17.5 g/dL    Hematocrit 42.4 40.5 - 52.5 %    MCV 87.4 80.0 - 100.0 fL    MCH 29.4 26.0 - 34.0 pg    MCHC 33.6 31.0 - 36.0 g/dL    RDW 13.3 12.4 - 15.4 %    Platelets 409 135 - 450 K/uL    MPV 8.3 5.0 - 10.5 fL    Neutrophils % 91.0 %    Lymphocytes % 6.1 %    Monocytes % 2.7 %

## 2024-03-14 ENCOUNTER — CARE COORDINATION (OUTPATIENT)
Dept: CASE MANAGEMENT | Age: 44
End: 2024-03-14

## 2024-03-14 NOTE — CARE COORDINATION
Care Transitions Initial Follow Up Call    Call within 2 business days of discharge: Yes    Patient Current Location:  [place]    Care Transition Nurse contacted the patient by telephone to perform post hospital discharge assessment.  Provided introduction to self, and explanation of the Care Transition Nurse role.     Patient: Flip Hassan Patient : 1980   MRN: [id number]  Reason for Admission: Tonsillar edema - sent by PCP  Discharge Date: 3/13/24 RARS: Readmission Risk Score: 5.3      Last Discharge Facility       Date Complaint Diagnosis Description Type Department Provider    3/11/24 Otalgia; Tonsil Swelling Peritonsillar abscess ED to Hosp-Admission (Discharged) (ADMITTED) [facility]W Robbin Kaye MD; [place]...            Was this an external facility discharge? No Discharge Facility: [facility]    Challenges to be reviewed by the provider   Additional needs identified to be addressed with provider: No                 Method of communication with provider: none.    Initial attempt at CT discharge phone call. Spoke briefly with Flip. He states he is \"fantastic.\" He declines completing CT discharge phone call or any additional CTN outreach.                 Follow Up  Future Appointments   Date Time Provider Department Center   2024  8:45 AM Koko Mccracken MD [facility]   4/10/2024  1:45 PM Charles Mohamud MD [facility]   2024  7:00 AM Jamaal Cash DO [facility]       Care Transition Nurse provided contact information.  No further follow-up call indicated  - patient declined.    Brisa Chambers RN BSN  Care Transition Nurse  [phone]

## 2024-04-04 ENCOUNTER — OFFICE VISIT (OUTPATIENT)
Dept: ENT CLINIC | Age: 44
End: 2024-04-04

## 2024-04-04 VITALS
DIASTOLIC BLOOD PRESSURE: 84 MMHG | HEART RATE: 75 BPM | HEIGHT: 74 IN | RESPIRATION RATE: 16 BRPM | WEIGHT: 315 LBS | OXYGEN SATURATION: 96 % | BODY MASS INDEX: 40.43 KG/M2 | SYSTOLIC BLOOD PRESSURE: 119 MMHG

## 2024-04-04 DIAGNOSIS — Z87.09 HISTORY OF TONSILLITIS: ICD-10-CM

## 2024-04-04 DIAGNOSIS — H61.23 BILATERAL IMPACTED CERUMEN: Primary | ICD-10-CM

## 2024-04-04 DIAGNOSIS — J35.8 TONSIL ASYMMETRY: ICD-10-CM

## 2024-04-04 NOTE — PROGRESS NOTES
[facility]  DIVISION OF OTOLARYNGOLOGY- HEAD & NECK SURGERY        Flip Hassan (:  1980) is a 43 y.o. male, here for evaluation of the following chief complaint(s):  Pharyngitis (Tonsil abscess ) and Follow-up      ASSESSMENT/PLAN:  1. Bilateral impacted cerumen  2. History of tonsillitis  3. Tonsil asymmetry      This is a very pleasant 43 y.o. male here today for evaluation of the the above-noted complaints.      -Recently admitted for tonsillitis.  CT scan showed no discrete masses.  Patient has recovered well from this.  -Asymmetry noted of left tonsil.  It is soft on palpation.  I will see him back in the next several months to check and ensure it is not growing in size.  His CT scan from  showed actual enlargement and asymmetric thickening of the right side compared to the left.    -Bilateral cerumen removed.  Follow-up as needed for this.        Medical Decision Making:  The following items were considered in medical decision making:  Independent review of images  Review / order clinical lab tests  Review / order radiology tests  Decision to obtain old records  Review and summation of old records as accessed through [employer] if applicable    SUBJECTIVE/OBJECTIVE:  HPI    Prior treatment notes:    Since I saw the patient last, we obtained an MRI IAC to rule out vascular pathology causing his pulsatile tinnitus.  I discussed with him that his MRI showed no evidence of vascular lesions.  It did show some mucoperiosteal thickening of the sinuses which is consistent with what he describes with regards to his sinonasal symptoms.  We again discussed trial of something such as fluticasone or allergy testing.    Patient has a small lesion of his left external ear.  I will prescribe him mupirocin ointment to use for the next 7 days.    Cerumen removed from both ears.  Follow-up as needed for this.    HISTORY OF PRESENT ILLNESS  Flip is a(n) 43 y.o. male who presents today for evaluation of

## 2024-04-10 ENCOUNTER — TELEPHONE (OUTPATIENT)
Dept: BARIATRICS/WEIGHT MGMT | Age: 44
End: 2024-04-10

## 2024-04-10 ENCOUNTER — TELEMEDICINE (OUTPATIENT)
Dept: BARIATRICS/WEIGHT MGMT | Age: 44
End: 2024-04-10
Payer: COMMERCIAL

## 2024-04-10 VITALS — BODY MASS INDEX: 40.43 KG/M2 | HEIGHT: 74 IN | WEIGHT: 315 LBS

## 2024-04-10 DIAGNOSIS — E66.01 MORBID OBESITY WITH BMI OF 45.0-49.9, ADULT (HCC): Primary | ICD-10-CM

## 2024-04-10 DIAGNOSIS — Z71.3 DIETARY COUNSELING AND SURVEILLANCE: ICD-10-CM

## 2024-04-10 PROCEDURE — 99214 OFFICE O/P EST MOD 30 MIN: CPT | Performed by: FAMILY MEDICINE

## 2024-04-10 RX ORDER — TIRZEPATIDE 5 MG/.5ML
0.5 INJECTION, SOLUTION SUBCUTANEOUS
Qty: 0.5 ML | Refills: 3 | Status: SHIPPED | OUTPATIENT
Start: 2024-04-10

## 2024-04-10 ASSESSMENT — ENCOUNTER SYMPTOMS
CHOKING: 0
BLOOD IN STOOL: 0
WHEEZING: 0
CHEST TIGHTNESS: 0
COUGH: 0
NAUSEA: 0
ABDOMINAL DISTENTION: 0
VOMITING: 0
ABDOMINAL PAIN: 0
PHOTOPHOBIA: 0
EYE PAIN: 0
CONSTIPATION: 0
APNEA: 0
SHORTNESS OF BREATH: 0

## 2024-04-10 NOTE — PROGRESS NOTES
Final    MCH 03/11/2024 29.8  26.0 - 34.0 pg Final    MCHC 03/11/2024 34.6  31.0 - 36.0 g/dL Final    RDW 03/11/2024 13.3  12.4 - 15.4 % Final    Platelets 03/11/2024 362  135 - 450 K/uL Final    MPV 03/11/2024 7.9  5.0 - 10.5 fL Final    Neutrophils % 03/11/2024 76.2  % Final    Lymphocytes % 03/11/2024 13.7  % Final    Monocytes % 03/11/2024 6.2  % Final    Eosinophils % 03/11/2024 2.6  % Final    Basophils % 03/11/2024 1.3  % Final    Neutrophils Absolute 03/11/2024 11.5 (H)  1.7 - 7.7 K/uL Final    Lymphocytes Absolute 03/11/2024 2.1  1.0 - 5.1 K/uL Final    Monocytes Absolute 03/11/2024 0.9  0.0 - 1.3 K/uL Final    Eosinophils Absolute 03/11/2024 0.4  0.0 - 0.6 K/uL Final    Basophils Absolute 03/11/2024 0.2  0.0 - 0.2 K/uL Final    Sodium 03/11/2024 135 (L)  136 - 145 mmol/L Final    Potassium reflex Magnesium 03/11/2024 4.1  3.5 - 5.1 mmol/L Final    Chloride 03/11/2024 99  99 - 110 mmol/L Final    CO2 03/11/2024 26  21 - 32 mmol/L Final    Anion Gap 03/11/2024 10  3 - 16 Final    Glucose 03/11/2024 87  70 - 99 mg/dL Final    BUN 03/11/2024 10  7 - 20 mg/dL Final    Creatinine 03/11/2024 0.6 (L)  0.9 - 1.3 mg/dL Final    Est, Glom Filt Rate 03/11/2024 >60  >60 Final    Comment: Pediatric calculator link  https://www.kidney.org/professionals/kdoqi/gfr_calculatorped  Effective Oct 3, 2022  These results are not intended for use in patients  <18 years of age.  eGFR results are calculated without  a race factor using the 2021 CKD-EPI equation.  Careful  clinical correlation is recommended, particularly when  comparing to results calculated using previous equations.  The CKD-EPI equation is less accurate in patients with  extremes of muscle mass, extra-renal metabolism of  creatinine, excessive creatinine ingestion, or following  therapy that affects renal tubular secretion.      Calcium 03/11/2024 9.0  8.3 - 10.6 mg/dL Final    Mono Test 03/11/2024 Negative  Negative Final    Sodium 03/12/2024 137  136 - 145

## 2024-04-11 NOTE — TELEPHONE ENCOUNTER
Submitted PA for Zepbound 5MG/0.5ML pen-injectors   Via [facility] [id number] STATUS: No PA Required     Outcome   Additional Information Required  CaseId:[id number];Status:Cancelled;Explanation:PA not Required. Medication is Covered;      If this requires a response please respond to the pool ( P MHCX PSC MEDICATION PRE-AUTH).      Thank you please advise patient.

## 2024-05-08 ENCOUNTER — TELEPHONE (OUTPATIENT)
Dept: BARIATRICS/WEIGHT MGMT | Age: 44
End: 2024-05-08

## 2024-05-08 ENCOUNTER — TELEMEDICINE (OUTPATIENT)
Dept: BARIATRICS/WEIGHT MGMT | Age: 44
End: 2024-05-08
Payer: COMMERCIAL

## 2024-05-08 VITALS — BODY MASS INDEX: 40.43 KG/M2 | HEIGHT: 74 IN | WEIGHT: 315 LBS

## 2024-05-08 DIAGNOSIS — E66.01 MORBID OBESITY WITH BMI OF 45.0-49.9, ADULT (HCC): Primary | ICD-10-CM

## 2024-05-08 DIAGNOSIS — Z71.3 DIETARY COUNSELING AND SURVEILLANCE: ICD-10-CM

## 2024-05-08 PROCEDURE — 99214 OFFICE O/P EST MOD 30 MIN: CPT | Performed by: FAMILY MEDICINE

## 2024-05-08 RX ORDER — TIRZEPATIDE 7.5 MG/.5ML
0.5 INJECTION, SOLUTION SUBCUTANEOUS
Qty: 2 ML | Refills: 0 | Status: SHIPPED | OUTPATIENT
Start: 2024-05-08

## 2024-05-08 ASSESSMENT — ENCOUNTER SYMPTOMS
WHEEZING: 0
PHOTOPHOBIA: 0
CONSTIPATION: 0
NAUSEA: 0
ABDOMINAL DISTENTION: 0
CHEST TIGHTNESS: 0
APNEA: 0
DIARRHEA: 0
VOMITING: 0
COUGH: 0
SHORTNESS OF BREATH: 0
CHOKING: 0
BLOOD IN STOOL: 0
EYE PAIN: 0
ABDOMINAL PAIN: 0

## 2024-05-08 NOTE — PROGRESS NOTES
% Final    Platelets 03/11/2024 362  135 - 450 K/uL Final    MPV 03/11/2024 7.9  5.0 - 10.5 fL Final    Neutrophils % 03/11/2024 76.2  % Final    Lymphocytes % 03/11/2024 13.7  % Final    Monocytes % 03/11/2024 6.2  % Final    Eosinophils % 03/11/2024 2.6  % Final    Basophils % 03/11/2024 1.3  % Final    Neutrophils Absolute 03/11/2024 11.5 (H)  1.7 - 7.7 K/uL Final    Lymphocytes Absolute 03/11/2024 2.1  1.0 - 5.1 K/uL Final    Monocytes Absolute 03/11/2024 0.9  0.0 - 1.3 K/uL Final    Eosinophils Absolute 03/11/2024 0.4  0.0 - 0.6 K/uL Final    Basophils Absolute 03/11/2024 0.2  0.0 - 0.2 K/uL Final    Sodium 03/11/2024 135 (L)  136 - 145 mmol/L Final    Potassium reflex Magnesium 03/11/2024 4.1  3.5 - 5.1 mmol/L Final    Chloride 03/11/2024 99  99 - 110 mmol/L Final    CO2 03/11/2024 26  21 - 32 mmol/L Final    Anion Gap 03/11/2024 10  3 - 16 Final    Glucose 03/11/2024 87  70 - 99 mg/dL Final    BUN 03/11/2024 10  7 - 20 mg/dL Final    Creatinine 03/11/2024 0.6 (L)  0.9 - 1.3 mg/dL Final    Est, Glom Filt Rate 03/11/2024 >60  >60 Final    Comment: Pediatric calculator link  https://www.kidney.org/professionals/kdoqi/gfr_calculatorped  Effective Oct 3, 2022  These results are not intended for use in patients  <18 years of age.  eGFR results are calculated without  a race factor using the 2021 CKD-EPI equation.  Careful  clinical correlation is recommended, particularly when  comparing to results calculated using previous equations.  The CKD-EPI equation is less accurate in patients with  extremes of muscle mass, extra-renal metabolism of  creatinine, excessive creatinine ingestion, or following  therapy that affects renal tubular secretion.      Calcium 03/11/2024 9.0  8.3 - 10.6 mg/dL Final    Mono Test 03/11/2024 Negative  Negative Final    Sodium 03/12/2024 137  136 - 145 mmol/L Final    Potassium reflex Magnesium 03/12/2024 4.7  3.5 - 5.1 mmol/L Final    Chloride 03/12/2024 105  99 - 110 mmol/L Final    CO2

## 2024-05-14 ENCOUNTER — TELEPHONE (OUTPATIENT)
Dept: BARIATRICS/WEIGHT MGMT | Age: 44
End: 2024-05-14

## 2024-05-14 NOTE — TELEPHONE ENCOUNTER
Spoke with patient.  He will call around to different pharmacies in his area to check for availability

## 2024-06-01 ENCOUNTER — TELEMEDICINE (OUTPATIENT)
Dept: BARIATRICS/WEIGHT MGMT | Age: 44
End: 2024-06-01
Payer: COMMERCIAL

## 2024-06-01 DIAGNOSIS — Z71.3 DIETARY COUNSELING AND SURVEILLANCE: ICD-10-CM

## 2024-06-01 DIAGNOSIS — E66.01 MORBID OBESITY WITH BMI OF 45.0-49.9, ADULT (HCC): Primary | ICD-10-CM

## 2024-06-01 PROCEDURE — 99214 OFFICE O/P EST MOD 30 MIN: CPT | Performed by: FAMILY MEDICINE

## 2024-06-01 RX ORDER — TIRZEPATIDE 7.5 MG/.5ML
0.5 INJECTION, SOLUTION SUBCUTANEOUS
Qty: 2 ML | Refills: 0 | Status: SHIPPED | OUTPATIENT
Start: 2024-06-01

## 2024-06-01 ASSESSMENT — ENCOUNTER SYMPTOMS
WHEEZING: 0
DIARRHEA: 0
EYE PAIN: 0
BLOOD IN STOOL: 0
ABDOMINAL PAIN: 0
CHEST TIGHTNESS: 0
NAUSEA: 0
CHOKING: 0
ABDOMINAL DISTENTION: 0
CONSTIPATION: 0
VOMITING: 0
APNEA: 0
PHOTOPHOBIA: 0
COUGH: 0
SHORTNESS OF BREATH: 0

## 2024-06-01 NOTE — PROGRESS NOTES
Patient: Flip Hassan                      Encounter Date: 6/1/2024    YOB: 1980                Age: 43 y.o.    Chief Complaint   Patient presents with    Weight Management     F/u MWM         Patient identification was verified at the start of the visit.         6/1/2024     3:03 PM   Patient-Reported Vitals   Patient-Reported Height 6'2''         BP Readings from Last 1 Encounters:   04/04/24 119/84       BMI Readings from Last 1 Encounters:   05/08/24 47.40 kg/m²       Pulse Readings from Last 1 Encounters:   04/04/24 75          Wt Readings from Last 3 Encounters:   05/08/24 (!) 167.5 kg (369 lb 3.2 oz)   04/10/24 (!) 170.9 kg (376 lb 12.8 oz)   04/04/24 (!) 172.4 kg (380 lb)        Self-reported weight: ?     HPI: 43 y.o. male with a long-standing history of obesity presents today for virtual video follow-up. Has not checked his weight since his last visit. Current treatment includes Zepbound 7.5 mg SC weekly and low carb/tatiana diet.  Was off tx for a few weeks due to back order.      Medication(s): Appetite well controlled?     [x]Yes      []No                          Focus:     [x]Good     []Fair     []Poor                          Side effects? No        Any recent change in medication(s)? No        No Known Allergies      Current Outpatient Medications:     Tirzepatide-Weight Management (ZEPBOUND) 7.5 MG/0.5ML SOAJ, Inject 0.5 mLs into the skin every 7 days, Disp: 2 mL, Rfl: 0    buPROPion (WELLBUTRIN XL) 300 MG extended release tablet, TAKE 1 TABLET BY MOUTH EVERY MORNING, Disp: 90 tablet, Rfl: 0    citalopram (CELEXA) 40 MG tablet, TAKE 1 TABLET DAILY, Disp: 90 tablet, Rfl: 3    Multiple Vitamin (MULTIVITAMIN, BARIATRIC FUSION COMPLETE, CHEW TAB), Take 2 tablets by mouth daily, Disp: , Rfl:     Patient Active Problem List   Diagnosis    Morbid obesity with BMI of 45.0-49.9, adult (HCC)    Anxiety    LAUREN (obstructive sleep apnea)    Prediabetes    Vitamin D deficiency    Peritonsillar

## 2024-06-20 ENCOUNTER — TELEPHONE (OUTPATIENT)
Dept: BARIATRICS/WEIGHT MGMT | Age: 44
End: 2024-06-20

## 2024-06-20 DIAGNOSIS — E66.01 MORBID OBESITY WITH BMI OF 45.0-49.9, ADULT (HCC): Primary | ICD-10-CM

## 2024-06-20 DIAGNOSIS — Z71.3 DIETARY COUNSELING AND SURVEILLANCE: ICD-10-CM

## 2024-06-20 RX ORDER — TIRZEPATIDE 10 MG/.5ML
0.5 INJECTION, SOLUTION SUBCUTANEOUS
Qty: 2 ML | Refills: 0 | Status: SHIPPED | OUTPATIENT
Start: 2024-06-20

## 2024-06-20 NOTE — TELEPHONE ENCOUNTER
Patient's insurance will not cover another prescription of Zepbound 7.5mg. Patient must move to higher dose.     Pt advised to contact the office to schedule follow-up appointment once he has picked up Zepbound 10mg due to possible stock issues

## 2024-06-20 NOTE — TELEPHONE ENCOUNTER
Patient requesting a return call because [facility] and Insurance is stating they do not have a PA for Zepbound.    Please call and advise.

## 2024-06-20 NOTE — TELEPHONE ENCOUNTER
Spoke with patient. PA has been denied.   Insurance will not cover another month of Zepbound 7.5mg. Pt must switch to higher dose or purchase OOP.    Pt requested higher dose. Request sent to Dr Mohamud

## 2024-06-25 DIAGNOSIS — E66.01 MORBID OBESITY WITH BMI OF 45.0-49.9, ADULT (HCC): Primary | ICD-10-CM

## 2024-06-25 DIAGNOSIS — Z71.3 DIETARY COUNSELING AND SURVEILLANCE: ICD-10-CM

## 2024-06-25 RX ORDER — TIRZEPATIDE 10 MG/.5ML
0.5 INJECTION, SOLUTION SUBCUTANEOUS
Qty: 2 ML | Refills: 0 | Status: SHIPPED | OUTPATIENT
Start: 2024-06-25

## 2024-06-25 NOTE — TELEPHONE ENCOUNTER
Patient requesting pharmacy change due to lack of availability.     Pt advised to contact the office once he has picked up the medication to schedule 4 week follow-up appointment

## 2024-06-26 ENCOUNTER — OFFICE VISIT (OUTPATIENT)
Dept: ENT CLINIC | Age: 44
End: 2024-06-26
Payer: COMMERCIAL

## 2024-06-26 VITALS
HEART RATE: 87 BPM | OXYGEN SATURATION: 98 % | SYSTOLIC BLOOD PRESSURE: 118 MMHG | BODY MASS INDEX: 40.43 KG/M2 | DIASTOLIC BLOOD PRESSURE: 77 MMHG | HEIGHT: 74 IN | WEIGHT: 315 LBS

## 2024-06-26 DIAGNOSIS — Z87.09 HISTORY OF TONSILLITIS: Primary | ICD-10-CM

## 2024-06-26 DIAGNOSIS — L23.7 ALLERGIC CONTACT DERMATITIS DUE TO PLANTS, EXCEPT FOOD: ICD-10-CM

## 2024-06-26 DIAGNOSIS — L23.7 POISON IVY: ICD-10-CM

## 2024-06-26 PROCEDURE — 99213 OFFICE O/P EST LOW 20 MIN: CPT | Performed by: STUDENT IN AN ORGANIZED HEALTH CARE EDUCATION/TRAINING PROGRAM

## 2024-06-26 RX ORDER — TRIAMCINOLONE ACETONIDE 1 MG/G
OINTMENT TOPICAL 2 TIMES DAILY
Qty: 15 G | Refills: 1 | Status: SHIPPED | OUTPATIENT
Start: 2024-06-26 | End: 2024-07-03

## 2024-06-26 NOTE — PROGRESS NOTES
this seems to help his symptoms.    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  He has tried nasal sprays in the past but these did not help.  He does not use sinus irrigations or any other medications in his nose.  He is not getting nasal bleeding.  He will get intermittent sinus infections, usually several per year.      I independently reviewed the patients past medical history, past surgical history, and social history. They are unremarkable except as noted in the HPI and below.     The patient denies any family history related to the current complaint, and they deny any family history of bleeding disorders or difficulties with anesthesia unless noted below.     Update 4/27/2021:    Patient presents today for follow-up.  He has been using the nasal steroid sprays and irrigations.  He feels like this helped him somewhat.  However he is still having some clear nasal drainage and nasal obstruction.    He also feels like his ears are still clogged up.  He feels like his hearing is down.    Update 8/19/2021:    The patient presents today for follow-up regarding his chronic sinonasal complaints.  He is no longer using nasal steroid sprays in his nose.  He is still getting intermittent congestion.  He feels like his ears are clogged.    Update 3/3/2022:    Patient presents today for follow-up.  He feels like his ears are clogged again.  He notes that he had some decreased hearing and ringing in his ears.  He also notes that he has had significant weighting loss since undergoing bariatric surgery.    Update 5/12/2022:    Patient presents today for follow-up with a new complaint of pulsatile tinnitus.  Is been going on for about 2 weeks.  He notes that he is lost 150 pounds since his surgery.  He gets some vague ear fullness and pain.  He denies ear drainage.  He denies hearing loss.  He is not feeling lumps or bumps of the head and neck..  It does not make his symptoms better or worse.  He

## 2024-07-18 ENCOUNTER — OFFICE VISIT (OUTPATIENT)
Dept: BARIATRICS/WEIGHT MGMT | Age: 44
End: 2024-07-18
Payer: COMMERCIAL

## 2024-07-18 VITALS
BODY MASS INDEX: 40.43 KG/M2 | HEART RATE: 93 BPM | HEIGHT: 74 IN | WEIGHT: 315 LBS | DIASTOLIC BLOOD PRESSURE: 75 MMHG | SYSTOLIC BLOOD PRESSURE: 110 MMHG

## 2024-07-18 DIAGNOSIS — Z98.84 STATUS POST LAPAROSCOPIC SLEEVE GASTRECTOMY: ICD-10-CM

## 2024-07-18 DIAGNOSIS — E66.01 MORBID OBESITY WITH BMI OF 45.0-49.9, ADULT (HCC): Primary | ICD-10-CM

## 2024-07-18 PROCEDURE — 99214 OFFICE O/P EST MOD 30 MIN: CPT | Performed by: SURGERY

## 2024-07-18 RX ORDER — TAMSULOSIN HYDROCHLORIDE 0.4 MG/1
0.4 CAPSULE ORAL DAILY
COMMUNITY
Start: 2024-07-08

## 2024-07-18 NOTE — PROGRESS NOTES
Dietary Assessment Note      Vitals:   Vitals:    24 0704   BP: 110/75   Pulse: 93   Weight: (!) 161.9 kg (357 lb)   Height: 1.88 m (6' 2\")    Patient lost 14 lbs over 6 mos.    Total Weight Loss: 91 lbs    Labs reviewed:    Latest Reference Range & Units 24 05:37   Creatinine 0.9 - 1.3 mg/dL 0.6 (L)   (L): Data is abnormally low    Protein intake: >80 grams/day - estimated from recall    Fluid intake: 64-96oz water     Multivitamin/mineral intake:  no Fusion with Fe     Calcium intake:  no 2 chews - needs tp purchase more of both, but was taking     Other: none    Exercise:  active around the house, renovation moving things around. 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 No intentional exercise.      Nutrition Assessment: 3 year post-op visit. Pt on Zepbound with Dr. Mohamud, just increased dosage. Has helped to reduce snacking and boredom eating.  Has been on a \"protein kick.\"  B: protein shake   S: none  L: skipping (not feeling hungry)OR grilled 6 oz steak/chx/pork  S: none OR can on tuna OR 2 tbsp PB OR protein shake (if no lunch)  D: veggie packs + imitation crab OR salsbury steak + peas     Amount able to eat per sittinoz     Following  rule: 10-15 minutes, waits 30 before takes sips after salty foods    Food Intolerances/issues:  popcorn - lower abdominal pain, some diarrhea    Client Concerns: none    Goals:   - Restart MVI and calcium supplementation  - Focus on     - Aim for 2 servings of plant foods per day  - Continue diet plan with Dr. Mohamud    Plan: f/u per provider     NELL BURTON, MS, RD, LD  
(6' 2\")       Body mass index is 45.84 kg/m².      Current Outpatient Medications:     tamsulosin (FLOMAX) 0.4 MG capsule, Take 1 capsule by mouth daily, Disp: , Rfl:     Tirzepatide-Weight Management (ZEPBOUND) 10 MG/0.5ML SOAJ, Inject 0.5 mLs into the skin every 7 days, Disp: 2 mL, Rfl: 0    buPROPion (WELLBUTRIN XL) 300 MG extended release tablet, TAKE 1 TABLET BY MOUTH EVERY MORNING, Disp: 90 tablet, Rfl: 0    citalopram (CELEXA) 40 MG tablet, TAKE 1 TABLET DAILY, Disp: 90 tablet, Rfl: 3    Multiple Vitamin (MULTIVITAMIN, BARIATRIC FUSION COMPLETE, CHEW TAB), Take 2 tablets by mouth daily, Disp: , Rfl:       Review of Systems - History obtained from the patient  General ROS: negative  Psychological ROS: negative  Hematological and Lymphatic ROS: negative  Endocrine ROS: negative  Respiratory ROS: negative  Cardiovascular ROS: negative  Gastrointestinal ROS:negative  Genito-Urinary ROS: negative  Musculoskeletal ROS: negative   Skin ROS: negative    Physical Exam   Vitals Reviewed   Constitutional: Patient is oriented to person, place, and time. Patient appears well-developed and well-nourished. Patient is active and cooperative.  Non-toxic appearance. No distress.   Neck: Trachea normal and normal range of motion. No JVD present.   Pulmonary/Chest: Effort normal. No accessory muscle usage or stridor. No apnea. No respiratory distress.   Cardiovascular: Normal rate and no JVD.   Abdominal: Normal appearance. Patient exhibits no distension. Abdomen is soft, obese, non tender.   Musculoskeletal: Normal range of motion. Patient exhibits no edema.   Neurological: Patient is alert and oriented to person, place, and time. Patient has normal strength. GCS eye subscore is 4. GCS verbal subscore is 5. GCS motor subscore is 6.   Skin: Skin is warm and dry. No abrasion and no rash noted. Patient is not diaphoretic. No cyanosis or erythema.   Psychiatric: Patient has a normal mood and affect. Speech is normal and behavior is

## 2024-07-24 ENCOUNTER — TELEMEDICINE (OUTPATIENT)
Dept: BARIATRICS/WEIGHT MGMT | Age: 44
End: 2024-07-24
Payer: COMMERCIAL

## 2024-07-24 DIAGNOSIS — Z71.3 DIETARY COUNSELING AND SURVEILLANCE: ICD-10-CM

## 2024-07-24 DIAGNOSIS — E66.01 MORBID OBESITY WITH BMI OF 45.0-49.9, ADULT (HCC): Primary | ICD-10-CM

## 2024-07-24 PROCEDURE — 99214 OFFICE O/P EST MOD 30 MIN: CPT | Performed by: FAMILY MEDICINE

## 2024-07-24 RX ORDER — TIRZEPATIDE 12.5 MG/.5ML
0.5 INJECTION, SOLUTION SUBCUTANEOUS
Qty: 2 ML | Refills: 0 | Status: SHIPPED | OUTPATIENT
Start: 2024-07-24

## 2024-07-24 ASSESSMENT — ENCOUNTER SYMPTOMS
WHEEZING: 0
ABDOMINAL PAIN: 0
BLOOD IN STOOL: 0
NAUSEA: 0
SHORTNESS OF BREATH: 0
VOMITING: 0
PHOTOPHOBIA: 0
DIARRHEA: 0
APNEA: 0
ABDOMINAL DISTENTION: 0
CONSTIPATION: 0
EYE PAIN: 0
CHOKING: 0
COUGH: 0
CHEST TIGHTNESS: 0

## 2024-07-24 NOTE — PROGRESS NOTES
27.5 (H)  1.7 - 7.7 K/uL Final    Lymphocytes Absolute 03/13/2024 1.8  1.0 - 5.1 K/uL Final    Monocytes Absolute 03/13/2024 0.8  0.0 - 1.3 K/uL Final    Eosinophils Absolute 03/13/2024 0.0  0.0 - 0.6 K/uL Final    Basophils Absolute 03/13/2024 0.1  0.0 - 0.2 K/uL Final         Assessment and Plan:  1. Morbid obesity with BMI of 45.0-49.9, adult (HCC)  Improving, not at goal.  Continue diet ad exercise.  Increase Zepbound to 12.5 mg SC weekly.  F/u 4 weeks.     2. Dietary counseling and surveillance  Low carb/tatiana diet.  Protein with every meal and snack.   Avoid skipping meals.            Nutrition Plan: [] LCHF/Ketogenic   [x] Modified low-calorie diet (low carb/low-tatiana)               [] Low-calorie diet    [] Maintenance       []Other        Exercise: [x] Cardio     [x] Resistance/strength training                       [x] ACSM recommendations (150 minutes/week in active weight loss)               Behavior: [x] Motivational interviewing performed    [] Referral for counseling                         [x] Discussed strategies to overcome habits/challenges for focus         [] Stress management   [x] Stimulus control         [] Sleep hygiene      Reviewed:  [x] Nutrition and the importance of regular protein intake  [x] Hidden carbohydrate sources  [x] Alcohol use  [x] Tobacco use   [x] Drug use- Denies  [x] Importance of exercise and reducing sedentary time  [x] Treatment consent- Patient understands and agrees with the treatment plan   [x] Proper use of medication and side effects        Treatment start date: 3/15/24  Starting weight: 386 pounds    Total weight loss: 29 pounds    Key dietary points:    - Meats (preferably organic or grass fed) are great sources of protein and have no carbohydrates.  - Recommend coconut, olive, avocado, or almond oils.  - When buying dairy, choose regular or full fat options.  - Choose vegetables that grow above ground as they are generally lower in carbohydrates and higher in

## 2024-07-25 ENCOUNTER — TELEPHONE (OUTPATIENT)
Dept: BARIATRICS/WEIGHT MGMT | Age: 44
End: 2024-07-25

## 2024-08-22 ENCOUNTER — TELEMEDICINE (OUTPATIENT)
Dept: BARIATRICS/WEIGHT MGMT | Age: 44
End: 2024-08-22
Payer: COMMERCIAL

## 2024-08-22 VITALS — BODY MASS INDEX: 40.43 KG/M2 | WEIGHT: 315 LBS | HEIGHT: 74 IN

## 2024-08-22 DIAGNOSIS — Z71.3 DIETARY COUNSELING AND SURVEILLANCE: ICD-10-CM

## 2024-08-22 DIAGNOSIS — E66.01 MORBID OBESITY WITH BMI OF 40.0-44.9, ADULT (HCC): Primary | ICD-10-CM

## 2024-08-22 PROCEDURE — 99214 OFFICE O/P EST MOD 30 MIN: CPT | Performed by: FAMILY MEDICINE

## 2024-08-22 RX ORDER — TIRZEPATIDE 12.5 MG/.5ML
0.5 INJECTION, SOLUTION SUBCUTANEOUS WEEKLY
Qty: 2 ML | Refills: 0 | Status: SHIPPED | OUTPATIENT
Start: 2024-08-22

## 2024-08-22 ASSESSMENT — ENCOUNTER SYMPTOMS
VOMITING: 0
DIARRHEA: 0
WHEEZING: 0
CHEST TIGHTNESS: 0
CONSTIPATION: 0
ABDOMINAL DISTENTION: 0
COUGH: 0
CHOKING: 0
ABDOMINAL PAIN: 0
EYE PAIN: 0
SHORTNESS OF BREATH: 0
APNEA: 0
PHOTOPHOBIA: 0
NAUSEA: 0
BLOOD IN STOOL: 0

## 2024-08-22 NOTE — PROGRESS NOTES
(obstructive sleep apnea)    Prediabetes    Vitamin D deficiency    Peritonsillar abscess    Pharyngitis    Chronic ethmoidal sinusitis       Review of Systems   Constitutional:  Negative for fatigue.   Eyes:  Negative for photophobia, pain and visual disturbance.   Respiratory:  Negative for apnea, cough, choking, chest tightness, shortness of breath and wheezing.    Cardiovascular:  Negative for chest pain, palpitations and leg swelling.   Gastrointestinal:  Negative for abdominal distention, abdominal pain, blood in stool, constipation, diarrhea, nausea and vomiting.   Endocrine: Negative for cold intolerance and heat intolerance.   Musculoskeletal:  Negative for arthralgias and myalgias.   Skin:  Negative for rash.   Neurological:  Negative for dizziness, tremors, syncope, weakness, numbness and headaches.   Psychiatric/Behavioral:  Negative for agitation, confusion, decreased concentration, dysphoric mood, hallucinations, sleep disturbance and suicidal ideas. The patient is not nervous/anxious and is not hyperactive.        Physical Exam  Constitutional:       Appearance: He is well-developed.   HENT:      Head: Normocephalic.   Eyes:      Conjunctiva/sclera: Conjunctivae normal.   Abdominal:      General: Abdomen is protuberant.   Musculoskeletal:         General: No swelling.   Neurological:      Mental Status: He is alert and oriented to person, place, and time.   Psychiatric:         Mood and Affect: Mood normal.         Behavior: Behavior normal.         Thought Content: Thought content normal.         Judgment: Judgment normal.         Admission on 03/11/2024, Discharged on 03/13/2024   Component Date Value Ref Range Status    WBC 03/11/2024 15.1 (H)  4.0 - 11.0 K/uL Final    RBC 03/11/2024 4.95  4.20 - 5.90 M/uL Final    Hemoglobin 03/11/2024 14.7  13.5 - 17.5 g/dL Final    Hematocrit 03/11/2024 42.5  40.5 - 52.5 % Final    MCV 03/11/2024 85.9  80.0 - 100.0 fL Final    MCH 03/11/2024 29.8  26.0 - 34.0 pg

## 2024-08-23 ENCOUNTER — TELEPHONE (OUTPATIENT)
Dept: BARIATRICS/WEIGHT MGMT | Age: 44
End: 2024-08-23

## 2024-08-23 NOTE — TELEPHONE ENCOUNTER
Submitted PA for Zepbound 12.5MG/0.5ML pen-injectors   Via [facility] [id number] STATUS:     Outcome  Additional Information Required  Clinical Override not needed    Follow up done daily; if no decision with in three days we will refax.  If another three days goes by with no decision will call the insurance for status.

## 2024-08-26 ENCOUNTER — TELEPHONE (OUTPATIENT)
Dept: BARIATRICS/WEIGHT MGMT | Age: 44
End: 2024-08-26

## 2024-08-27 DIAGNOSIS — Z71.3 DIETARY COUNSELING AND SURVEILLANCE: ICD-10-CM

## 2024-08-27 DIAGNOSIS — E66.01 MORBID OBESITY WITH BMI OF 40.0-44.9, ADULT (HCC): Primary | ICD-10-CM

## 2024-08-27 DIAGNOSIS — Z98.84 S/P LAPAROSCOPIC SLEEVE GASTRECTOMY: ICD-10-CM

## 2024-08-27 DIAGNOSIS — R73.03 PREDIABETES: ICD-10-CM

## 2024-08-27 RX ORDER — TIRZEPATIDE 15 MG/.5ML
0.5 INJECTION, SOLUTION SUBCUTANEOUS
Qty: 2 ML | Refills: 0 | Status: SHIPPED | OUTPATIENT
Start: 2024-08-27

## 2024-08-27 NOTE — TELEPHONE ENCOUNTER
Insurance will not cover a second dose of Zepbound 12.5mg . Pt will need to increase in order to be approved by insurance.     Follow-up appointment 9/9/24

## 2024-08-27 NOTE — TELEPHONE ENCOUNTER
Contacted patient, information given.  Please see previous Prior Authorization telephone encounter

## 2024-08-30 NOTE — TELEPHONE ENCOUNTER
Submitted PA for Zepbound 7.5MG/0.5ML pen-injectors   Via CMM Key: [id number] STATUS:     Outcome     An active PA is already on file with expiration date of 10/02/2024. Please wait to resubmit request within 60 days of that expiration date to obtain a PA renewal.    If this requires a response please respond to the pool ( P MHCX PSC MEDICATION PRE-AUTH).      Thank you please advise patient.      
Home

## 2024-09-24 ENCOUNTER — TELEMEDICINE (OUTPATIENT)
Dept: BARIATRICS/WEIGHT MGMT | Age: 44
End: 2024-09-24
Payer: COMMERCIAL

## 2024-09-24 ENCOUNTER — TELEPHONE (OUTPATIENT)
Dept: BARIATRICS/WEIGHT MGMT | Age: 44
End: 2024-09-24

## 2024-09-24 VITALS — WEIGHT: 315 LBS | BODY MASS INDEX: 40.43 KG/M2 | HEIGHT: 74 IN

## 2024-09-24 DIAGNOSIS — E66.01 MORBID OBESITY WITH BMI OF 40.0-44.9, ADULT: Primary | ICD-10-CM

## 2024-09-24 DIAGNOSIS — Z71.3 DIETARY COUNSELING AND SURVEILLANCE: ICD-10-CM

## 2024-09-24 PROCEDURE — G2211 COMPLEX E/M VISIT ADD ON: HCPCS | Performed by: FAMILY MEDICINE

## 2024-09-24 PROCEDURE — 99214 OFFICE O/P EST MOD 30 MIN: CPT | Performed by: FAMILY MEDICINE

## 2024-09-24 RX ORDER — TIRZEPATIDE 15 MG/.5ML
0.5 INJECTION, SOLUTION SUBCUTANEOUS WEEKLY
Qty: 2 ML | Refills: 0 | Status: SHIPPED | OUTPATIENT
Start: 2024-09-24

## 2024-09-24 ASSESSMENT — ENCOUNTER SYMPTOMS
SHORTNESS OF BREATH: 0
DIARRHEA: 0
CONSTIPATION: 0
WHEEZING: 0
ABDOMINAL DISTENTION: 0
ABDOMINAL PAIN: 0
VOMITING: 0
EYE PAIN: 0
CHEST TIGHTNESS: 0
BLOOD IN STOOL: 0
CHOKING: 0
NAUSEA: 0
APNEA: 0
PHOTOPHOBIA: 0
COUGH: 0

## 2024-10-18 ENCOUNTER — TELEMEDICINE (OUTPATIENT)
Dept: BARIATRICS/WEIGHT MGMT | Age: 44
End: 2024-10-18
Payer: COMMERCIAL

## 2024-10-18 VITALS — BODY MASS INDEX: 40.43 KG/M2 | WEIGHT: 315 LBS | HEIGHT: 74 IN

## 2024-10-18 DIAGNOSIS — Z71.3 DIETARY COUNSELING AND SURVEILLANCE: ICD-10-CM

## 2024-10-18 DIAGNOSIS — E66.01 MORBID OBESITY WITH BMI OF 40.0-44.9, ADULT: Primary | ICD-10-CM

## 2024-10-18 PROCEDURE — G2211 COMPLEX E/M VISIT ADD ON: HCPCS | Performed by: FAMILY MEDICINE

## 2024-10-18 PROCEDURE — 99214 OFFICE O/P EST MOD 30 MIN: CPT | Performed by: FAMILY MEDICINE

## 2024-10-18 RX ORDER — TIRZEPATIDE 15 MG/.5ML
0.5 INJECTION, SOLUTION SUBCUTANEOUS WEEKLY
Qty: 2 ML | Refills: 0 | Status: SHIPPED | OUTPATIENT
Start: 2024-10-18

## 2024-10-18 ASSESSMENT — ENCOUNTER SYMPTOMS
NAUSEA: 0
EYE PAIN: 0
CONSTIPATION: 0
CHEST TIGHTNESS: 0
SHORTNESS OF BREATH: 0
APNEA: 0
BLOOD IN STOOL: 0
ABDOMINAL PAIN: 0
COUGH: 0
PHOTOPHOBIA: 0
CHOKING: 0
WHEEZING: 0
VOMITING: 0
DIARRHEA: 0
ABDOMINAL DISTENTION: 0

## 2024-10-18 NOTE — PROGRESS NOTES
Patient: Flip Hassan                      Encounter Date: 10/18/2024    YOB: 1980                Age: 43 y.o.    Chief Complaint   Patient presents with    Weight Management     F/u MWM         Patient identification was verified at the start of the visit.         10/18/2024    11:23 AM   Patient-Reported Vitals   Patient-Reported Weight 331   Patient-Reported Height 6'2''         BP Readings from Last 1 Encounters:   07/18/24 110/75       BMI Readings from Last 1 Encounters:   09/24/24 43.83 kg/m²       Pulse Readings from Last 1 Encounters:   07/18/24 93          Wt Readings from Last 3 Encounters:   09/24/24 (!) 154.9 kg (341 lb 6.4 oz)   08/22/24 (!) 156.5 kg (345 lb)   07/18/24 (!) 161.9 kg (357 lb)        Self-reported weight: 331 pounds (10/18)     HPI: 43 y.o. male with a long-standing history of obesity presents today for virtual video follow-up. He has lost 10 pounds since his last visit. Current treatment includes Zepbound 15 mg SC weekly and low carb/tatiana diet. Clothes fitting much looser. Losing inches. Trying to stay physically active.      Personal goal: 250 pounds      Medication(s): Appetite well controlled?     [x]Yes      []No                          Focus:     [x]Good     []Fair     []Poor                          Side effects? Constipation- resolved with Miralax        Any recent change in medication(s)? No          No Known Allergies      Current Outpatient Medications:     buPROPion (WELLBUTRIN XL) 300 MG extended release tablet, TAKE 1 TABLET BY MOUTH EVERY MORNING, Disp: 90 tablet, Rfl: 0    Tirzepatide-Weight Management (ZEPBOUND) 15 MG/0.5ML SOAJ, Inject 0.5 mLs into the skin once a week, Disp: 2 mL, Rfl: 0    tamsulosin (FLOMAX) 0.4 MG capsule, Take 1 capsule by mouth daily, Disp: , Rfl:     citalopram (CELEXA) 40 MG tablet, TAKE 1 TABLET DAILY, Disp: 90 tablet, Rfl: 3    Multiple Vitamin (MULTIVITAMIN, BARIATRIC FUSION COMPLETE, CHEW TAB), Take 2 tablets by mouth

## 2024-11-01 ENCOUNTER — HOSPITAL ENCOUNTER (OUTPATIENT)
Dept: CT IMAGING | Age: 44
Discharge: HOME | End: 2024-11-01
Attending: STUDENT IN AN ORGANIZED HEALTH CARE EDUCATION/TRAINING PROGRAM
Payer: COMMERCIAL

## 2024-11-01 DIAGNOSIS — R13.13 PHARYNGEAL DYSPHAGIA: ICD-10-CM

## 2024-11-01 PROCEDURE — 70491 CT SOFT TISSUE NECK W/DYE: CPT

## 2024-11-01 PROCEDURE — 6360000004 HC RX CONTRAST MEDICATION: Performed by: STUDENT IN AN ORGANIZED HEALTH CARE EDUCATION/TRAINING PROGRAM

## 2024-11-01 RX ORDER — IOPAMIDOL 755 MG/ML
75 INJECTION, SOLUTION INTRAVASCULAR
Status: COMPLETED | OUTPATIENT
Start: 2024-11-01 | End: 2024-11-01

## 2024-11-01 RX ADMIN — IOPAMIDOL 75 ML: 755 INJECTION, SOLUTION INTRAVENOUS at 15:50

## 2024-11-15 ENCOUNTER — TELEMEDICINE (OUTPATIENT)
Dept: BARIATRICS/WEIGHT MGMT | Age: 44
End: 2024-11-15
Payer: COMMERCIAL

## 2024-11-15 ENCOUNTER — TELEPHONE (OUTPATIENT)
Dept: BARIATRICS/WEIGHT MGMT | Age: 44
End: 2024-11-15

## 2024-11-15 VITALS — WEIGHT: 315 LBS | BODY MASS INDEX: 40.43 KG/M2 | HEIGHT: 74 IN

## 2024-11-15 DIAGNOSIS — E66.01 MORBID OBESITY WITH BMI OF 40.0-44.9, ADULT: Primary | ICD-10-CM

## 2024-11-15 DIAGNOSIS — Z71.3 DIETARY COUNSELING AND SURVEILLANCE: ICD-10-CM

## 2024-11-15 PROCEDURE — 99214 OFFICE O/P EST MOD 30 MIN: CPT | Performed by: FAMILY MEDICINE

## 2024-11-15 RX ORDER — TIRZEPATIDE 15 MG/.5ML
0.5 INJECTION, SOLUTION SUBCUTANEOUS WEEKLY
Qty: 2 ML | Refills: 2 | Status: SHIPPED | OUTPATIENT
Start: 2024-11-15

## 2024-11-15 ASSESSMENT — ENCOUNTER SYMPTOMS
PHOTOPHOBIA: 0
SHORTNESS OF BREATH: 0
CONSTIPATION: 0
CHOKING: 0
EYE PAIN: 0
APNEA: 0
BLOOD IN STOOL: 0
DIARRHEA: 0
ABDOMINAL DISTENTION: 0
ABDOMINAL PAIN: 0
WHEEZING: 0
NAUSEA: 0
COUGH: 0
VOMITING: 0
CHEST TIGHTNESS: 0

## 2024-12-02 DIAGNOSIS — E66.01 MORBID OBESITY WITH BMI OF 40.0-44.9, ADULT: ICD-10-CM

## 2024-12-02 RX ORDER — TIRZEPATIDE 15 MG/.5ML
0.5 INJECTION, SOLUTION SUBCUTANEOUS WEEKLY
Qty: 6 ML | Refills: 0 | Status: SHIPPED | OUTPATIENT
Start: 2024-12-02

## 2024-12-07 ENCOUNTER — HOSPITAL ENCOUNTER (EMERGENCY)
Age: 44
Discharge: HOME | End: 2024-12-07
Payer: COMMERCIAL

## 2024-12-07 ENCOUNTER — APPOINTMENT (OUTPATIENT)
Dept: GENERAL RADIOLOGY | Age: 44
End: 2024-12-07
Payer: COMMERCIAL

## 2024-12-07 VITALS
HEART RATE: 90 BPM | RESPIRATION RATE: 18 BRPM | DIASTOLIC BLOOD PRESSURE: 86 MMHG | BODY MASS INDEX: 40.43 KG/M2 | HEIGHT: 74 IN | WEIGHT: 315 LBS | TEMPERATURE: 98 F | OXYGEN SATURATION: 97 % | SYSTOLIC BLOOD PRESSURE: 132 MMHG

## 2024-12-07 DIAGNOSIS — R53.1 GENERALIZED WEAKNESS: Primary | ICD-10-CM

## 2024-12-07 DIAGNOSIS — R68.83 CHILLS: ICD-10-CM

## 2024-12-07 DIAGNOSIS — R06.09 DYSPNEA ON EXERTION: ICD-10-CM

## 2024-12-07 LAB
ALBUMIN SERPL-MCNC: 4.2 G/DL (ref 3.4–5)
ALBUMIN/GLOB SERPL: 1.2 {RATIO} (ref 1.1–2.2)
ALP SERPL-CCNC: 105 U/L (ref 40–129)
ALT SERPL-CCNC: 19 U/L (ref 10–40)
ANION GAP SERPL CALCULATED.3IONS-SCNC: 11 MMOL/L (ref 3–16)
AST SERPL-CCNC: 15 U/L (ref 15–37)
BILIRUB SERPL-MCNC: 0.5 MG/DL (ref 0–1)
BUN SERPL-MCNC: 12 MG/DL (ref 7–20)
CALCIUM SERPL-MCNC: 9.2 MG/DL (ref 8.3–10.6)
CHLORIDE SERPL-SCNC: 102 MMOL/L (ref 99–110)
CO2 SERPL-SCNC: 24 MMOL/L (ref 21–32)
CREAT SERPL-MCNC: 0.8 MG/DL (ref 0.9–1.3)
DEPRECATED RDW RBC AUTO: 13.7 % (ref 12.4–15.4)
FLUAV + FLUBV AG NOSE IA.RAPID: NOT DETECTED
FLUAV + FLUBV AG NOSE IA.RAPID: NOT DETECTED
GFR SERPLBLD CREATININE-BSD FMLA CKD-EPI: >90 ML/MIN/{1.73_M2}
GLUCOSE SERPL-MCNC: 92 MG/DL (ref 70–99)
HCT VFR BLD AUTO: 43.3 % (ref 40.5–52.5)
HGB BLD-MCNC: 14.3 G/DL (ref 13.5–17.5)
MCH RBC QN AUTO: 29.3 PG (ref 26–34)
MCHC RBC AUTO-ENTMCNC: 33.1 G/DL (ref 31–36)
MCV RBC AUTO: 88.5 FL (ref 80–100)
PLATELET # BLD AUTO: 387 K/UL (ref 135–450)
PMV BLD AUTO: 7.9 FL (ref 5–10.5)
POTASSIUM SERPL-SCNC: 3.5 MMOL/L (ref 3.5–5.1)
PROT SERPL-MCNC: 7.7 G/DL (ref 6.4–8.2)
RBC # BLD AUTO: 4.89 M/UL (ref 4.2–5.9)
SARS-COV-2 RDRP RESP QL NAA+PROBE: NOT DETECTED
SODIUM SERPL-SCNC: 137 MMOL/L (ref 136–145)
TROPONIN, HIGH SENSITIVITY: 12 NG/L (ref 0–22)
WBC # BLD AUTO: 14.1 K/UL (ref 4–11)

## 2024-12-07 PROCEDURE — 85027 COMPLETE CBC AUTOMATED: CPT

## 2024-12-07 PROCEDURE — 99285 EMERGENCY DEPT VISIT HI MDM: CPT

## 2024-12-07 PROCEDURE — 87635 SARS-COV-2 COVID-19 AMP PRB: CPT

## 2024-12-07 PROCEDURE — 80053 COMPREHEN METABOLIC PANEL: CPT

## 2024-12-07 PROCEDURE — 87502 INFLUENZA DNA AMP PROBE: CPT

## 2024-12-07 PROCEDURE — 84484 ASSAY OF TROPONIN QUANT: CPT

## 2024-12-07 PROCEDURE — 93005 ELECTROCARDIOGRAM TRACING: CPT | Performed by: PHYSICIAN ASSISTANT

## 2024-12-07 PROCEDURE — 71046 X-RAY EXAM CHEST 2 VIEWS: CPT

## 2024-12-07 ASSESSMENT — PAIN SCALES - GENERAL
PAINLEVEL_OUTOF10: 0
PAINLEVEL_OUTOF10: 0

## 2024-12-07 ASSESSMENT — LIFESTYLE VARIABLES
HOW OFTEN DO YOU HAVE A DRINK CONTAINING ALCOHOL: NEVER
HOW MANY STANDARD DRINKS CONTAINING ALCOHOL DO YOU HAVE ON A TYPICAL DAY: PATIENT DOES NOT DRINK

## 2024-12-07 NOTE — ED PROVIDER NOTES
[facility] EMERGENCY DEPARTMENT  EMERGENCY DEPARTMENT ENCOUNTER      Pt Name: Flip Hassan  MRN: [id number]  Birthdate 1980  Date of evaluation: 12/7/2024  Provider: STEPHAN Rowe  PCP: Robbin Kaye MD  Note Started: 5:18 PM EST     The ED Attending Physician was available for consultation but did not see or evaluate this patient.    CHIEF COMPLAINT       Chief Complaint   Patient presents with    Shortness of Breath     Patient feels tired, SOB, lightheaded, cold and clammy over the last few days. States he has HX of \"anemia\" and needed iron infusions in the past. VSS       HISTORY OF PRESENT ILLNESS   (Location, Timing/Onset, Context/Setting, Quality, Duration, Modifying Factors, Severity, Associated Signs and Symptoms)  Note limiting factors.     Flip Hassan is a 43 y.o. male who presents with complaints of feeling tired and generally weak, cold and clammy frequently, short of breath with physical activity.  The symptoms have been going on now for the last few days.  Says he has a history of anemia, and these symptoms feel consistent with that.  Denies any history of blood transfusion but says he has had iron infusions in the past, and it has been many months since he has taken any iron supplements.  He has past history of gastric bypass surgery, but says things are been going well regarding that, and even in the past few days he has been eating normally, with occasional nausea but no vomiting.  Bowel movements normal.  Urination is normal.  He denies abdominal pain.  He denies chest pain, cough, cold symptoms or any recorded fever.  No trauma.  No known recent sick contacts.  Not short of breath at rest but says he is getting unusually winded with physical activity.    Nursing Notes were all reviewed and agreed with or any disagreements were addressed in the HPI.    REVIEW OF SYSTEMS    (2-9 systems for level 4, 10 or more for level 5)     Positives and pertinent

## 2024-12-07 NOTE — ED TRIAGE NOTES
Patient feels tired, SOB, lightheaded, cold and clammy over the last few days. States he has HX of \"anemia\" and needed iron infusions in the past. VSS

## 2024-12-07 NOTE — ED NOTES
ED Attending EKG Interpretation Note     Date of evaluation: 12/7/2024    This patient was seen by the advance practice provider.  I have not seen or examined the patient.    EKG Indication: Shortness of breath  EKG Interpretation: EKG shows a normal sinus rhythm with a ventricular rate of 75 bpm, normal axis, QTc 473, no ST segment elevations or depressions concerning for ischemia, T wave amplitude slightly decreased in comparison to prior on 7/1/2021, otherwise morphology similar     Flip Holcomb, DO  12/07/24 1408

## 2024-12-08 LAB
EKG ATRIAL RATE: 75 BPM
EKG DIAGNOSIS: NORMAL
EKG P AXIS: 40 DEGREES
EKG P-R INTERVAL: 188 MS
EKG Q-T INTERVAL: 424 MS
EKG QRS DURATION: 98 MS
EKG QTC CALCULATION (BAZETT): 473 MS
EKG R AXIS: 36 DEGREES
EKG T AXIS: 34 DEGREES
EKG VENTRICULAR RATE: 75 BPM

## 2024-12-08 PROCEDURE — 93010 ELECTROCARDIOGRAM REPORT: CPT | Performed by: INTERNAL MEDICINE

## 2025-02-11 ENCOUNTER — TELEMEDICINE (OUTPATIENT)
Dept: BARIATRICS/WEIGHT MGMT | Age: 45
End: 2025-02-11
Payer: COMMERCIAL

## 2025-02-11 VITALS — BODY MASS INDEX: 39.62 KG/M2 | HEIGHT: 74 IN | WEIGHT: 308.7 LBS

## 2025-02-11 DIAGNOSIS — Z71.3 DIETARY COUNSELING AND SURVEILLANCE: ICD-10-CM

## 2025-02-11 DIAGNOSIS — E66.01 MORBID OBESITY WITH BMI OF 40.0-44.9, ADULT: Primary | ICD-10-CM

## 2025-02-11 PROCEDURE — 99214 OFFICE O/P EST MOD 30 MIN: CPT | Performed by: FAMILY MEDICINE

## 2025-02-11 PROCEDURE — G2211 COMPLEX E/M VISIT ADD ON: HCPCS | Performed by: FAMILY MEDICINE

## 2025-02-11 ASSESSMENT — ENCOUNTER SYMPTOMS
PHOTOPHOBIA: 0
EYE PAIN: 0
BLOOD IN STOOL: 0
VOMITING: 0
ABDOMINAL DISTENTION: 0
SHORTNESS OF BREATH: 0
ABDOMINAL PAIN: 0
WHEEZING: 0
NAUSEA: 0
COUGH: 0
DIARRHEA: 0
APNEA: 0
CONSTIPATION: 0
CHOKING: 0
CHEST TIGHTNESS: 0

## 2025-02-11 NOTE — PROGRESS NOTES
Patient: Flip Hassan                      Encounter Date: 2/11/2025    YOB: 1980                Age: 44 y.o.    Chief Complaint   Patient presents with   • Weight Management     F/u MWM          Patient identification was verified at the start of the visit.         10/18/2024    11:23 AM   Patient-Reported Vitals   Patient-Reported Weight 331   Patient-Reported Height 6'2''         BP Readings from Last 1 Encounters:   02/03/25 120/80       BMI Readings from Last 1 Encounters:   02/03/25 40.11 kg/m²       Pulse Readings from Last 1 Encounters:   02/03/25 80     Wt Readings from Last 3 Encounters:   02/03/25 (!) 141.7 kg (312 lb 6.4 oz)   12/09/24 (!) 148 kg (326 lb 3.2 oz)   12/07/24 (!) 150.7 kg (332 lb 3.7 oz)         Self-reported weight: 307 pounds (2/11)     HPI: 44 y.o. male with a long-standing history of obesity presents today for virtual video follow-up. He has lost 17 pounds since his last visit. Current treatment includes Zepbound 15 mg SC weekly. Mindful of his food choices and portion sizes. Hasn't been as physically active as before.      Personal goal: 250 pounds     No Known Allergies      Current Outpatient Medications:   •  tirzepatide-weight management (ZEPBOUND) 15 MG/0.5ML SOAJ subCUTAneous auto-injector pen, Inject 15 mg into the skin every 7 days, Disp: 2 mL, Rfl: 2  •  buPROPion 450 MG TB24, Take 450 mg by mouth every morning, Disp: 90 tablet, Rfl: 3  •  tamsulosin (FLOMAX) 0.4 MG capsule, Take 1 capsule by mouth daily, Disp: , Rfl:   •  citalopram (CELEXA) 40 MG tablet, TAKE 1 TABLET DAILY, Disp: 90 tablet, Rfl: 3  •  Multiple Vitamin (MULTIVITAMIN, BARIATRIC FUSION COMPLETE, CHEW TAB), Take 2 tablets by mouth daily, Disp: , Rfl:     Patient Active Problem List   Diagnosis   • Morbid obesity with BMI of 45.0-49.9, adult   • Anxiety   • LAUREN (obstructive sleep apnea)   • Prediabetes   • Vitamin D deficiency   • Peritonsillar abscess   • Pharyngitis   • Chronic ethmoidal

## 2025-04-22 ENCOUNTER — OFFICE VISIT (OUTPATIENT)
Dept: ENT CLINIC | Age: 45
End: 2025-04-22
Payer: COMMERCIAL

## 2025-04-22 VITALS
HEART RATE: 80 BPM | DIASTOLIC BLOOD PRESSURE: 83 MMHG | BODY MASS INDEX: 40.17 KG/M2 | HEIGHT: 74 IN | OXYGEN SATURATION: 97 % | SYSTOLIC BLOOD PRESSURE: 125 MMHG | WEIGHT: 313 LBS

## 2025-04-22 DIAGNOSIS — H65.04 RECURRENT ACUTE SEROUS OTITIS MEDIA OF RIGHT EAR: Primary | ICD-10-CM

## 2025-04-22 DIAGNOSIS — J35.8 TONSIL ASYMMETRY: ICD-10-CM

## 2025-04-22 DIAGNOSIS — Z87.09 HISTORY OF TONSILLITIS: ICD-10-CM

## 2025-04-22 DIAGNOSIS — H61.23 BILATERAL IMPACTED CERUMEN: ICD-10-CM

## 2025-04-22 DIAGNOSIS — H91.91 HEARING LOSS OF RIGHT EAR, UNSPECIFIED HEARING LOSS TYPE: ICD-10-CM

## 2025-04-22 PROCEDURE — 69210 REMOVE IMPACTED EAR WAX UNI: CPT | Performed by: STUDENT IN AN ORGANIZED HEALTH CARE EDUCATION/TRAINING PROGRAM

## 2025-04-22 PROCEDURE — 99213 OFFICE O/P EST LOW 20 MIN: CPT | Performed by: STUDENT IN AN ORGANIZED HEALTH CARE EDUCATION/TRAINING PROGRAM

## 2025-04-22 NOTE — PROGRESS NOTES
[facility]  DIVISION OF OTOLARYNGOLOGY- HEAD & NECK SURGERY        Flip Hassan (:  1980) is a 44 y.o. male, here for evaluation of the following chief complaint(s):  Cerumen Impaction (Bilateral ear cleaning right ear worse)      ASSESSMENT/PLAN:  1. Recurrent acute serous otitis media of right ear  2. Tonsil asymmetry  3. History of tonsillitis  4. Bilateral impacted cerumen  5. Hearing loss of right ear, unspecified hearing loss type      This is a very pleasant 44 y.o. male here today for evaluation of the the above-noted complaints.      -I feel the asymmetry of his tonsils is physiologic.  I do not feel he has a discrete mass.  Continue to monitor.  - History of ear tube on the right side.  Now has developed recurrent acute otitis media on the right side.  We will give it another month to see if it clears up.  If it persist, we will place a tympanostomy tube at his follow-up visit.  - Follow-up in 1 month      Medical Decision Making:  The following items were considered in medical decision making:  Independent review of images  Review / order clinical lab tests  Review / order radiology tests  Decision to obtain old records  Review and summation of old records as accessed through [place] if applicable    SUBJECTIVE/OBJECTIVE:  HPI    Prior treatment notes:    Since I saw the patient last, we obtained an MRI IAC to rule out vascular pathology causing his pulsatile tinnitus.  I discussed with him that his MRI showed no evidence of vascular lesions.  It did show some mucoperiosteal thickening of the sinuses which is consistent with what he describes with regards to his sinonasal symptoms.  We again discussed trial of something such as fluticasone or allergy testing.    Patient has a small lesion of his left external ear.  I will prescribe him mupirocin ointment to use for the next 7 days.    Cerumen removed from both ears.  Follow-up as needed for this.    HISTORY OF PRESENT ILLNESS  Flip garcia

## 2025-04-24 ENCOUNTER — OFFICE VISIT (OUTPATIENT)
Dept: PSYCHOLOGY | Age: 45
End: 2025-04-24
Payer: COMMERCIAL

## 2025-04-24 DIAGNOSIS — F41.8 ANXIOUS DEPRESSION: Primary | ICD-10-CM

## 2025-04-24 PROCEDURE — 90791 PSYCH DIAGNOSTIC EVALUATION: CPT | Performed by: PSYCHOLOGIST

## 2025-04-24 ASSESSMENT — ANXIETY QUESTIONNAIRES
GAD7 TOTAL SCORE: 12
6. BECOMING EASILY ANNOYED OR IRRITABLE: NOT AT ALL
3. WORRYING TOO MUCH ABOUT DIFFERENT THINGS: NEARLY EVERY DAY
7. FEELING AFRAID AS IF SOMETHING AWFUL MIGHT HAPPEN: MORE THAN HALF THE DAYS
4. TROUBLE RELAXING: MORE THAN HALF THE DAYS
2. NOT BEING ABLE TO STOP OR CONTROL WORRYING: NEARLY EVERY DAY
5. BEING SO RESTLESS THAT IT IS HARD TO SIT STILL: NOT AT ALL
1. FEELING NERVOUS, ANXIOUS, OR ON EDGE: MORE THAN HALF THE DAYS

## 2025-04-24 ASSESSMENT — PATIENT HEALTH QUESTIONNAIRE - PHQ9
2. FEELING DOWN, DEPRESSED OR HOPELESS: SEVERAL DAYS
9. THOUGHTS THAT YOU WOULD BE BETTER OFF DEAD, OR OF HURTING YOURSELF: NOT AT ALL
SUM OF ALL RESPONSES TO PHQ QUESTIONS 1-9: 8
4. FEELING TIRED OR HAVING LITTLE ENERGY: MORE THAN HALF THE DAYS
7. TROUBLE CONCENTRATING ON THINGS, SUCH AS READING THE NEWSPAPER OR WATCHING TELEVISION: NEARLY EVERY DAY
5. POOR APPETITE OR OVEREATING: SEVERAL DAYS
6. FEELING BAD ABOUT YOURSELF - OR THAT YOU ARE A FAILURE OR HAVE LET YOURSELF OR YOUR FAMILY DOWN: NOT AT ALL
SUM OF ALL RESPONSES TO PHQ QUESTIONS 1-9: 8
1. LITTLE INTEREST OR PLEASURE IN DOING THINGS: SEVERAL DAYS
SUM OF ALL RESPONSES TO PHQ QUESTIONS 1-9: 8
8. MOVING OR SPEAKING SO SLOWLY THAT OTHER PEOPLE COULD HAVE NOTICED. OR THE OPPOSITE, BEING SO FIGETY OR RESTLESS THAT YOU HAVE BEEN MOVING AROUND A LOT MORE THAN USUAL: NOT AT ALL
SUM OF ALL RESPONSES TO PHQ QUESTIONS 1-9: 8

## 2025-04-24 NOTE — PROGRESS NOTES
determining the sources of stress and anxiety that he can do something about, and those he cannot, as each require different coping tools.  He was given handouts to help him start using some non-medicine tools.       Pt Behavioral Change Plan:  Pt set the following goals: he is increasing his awareness of troublesome thought patterns that can keep him in \"fight or flight,\" and begin a deep breathing practice.       Pt scheduled F/U visit in several weeks

## 2025-04-24 NOTE — PATIENT INSTRUCTIONS
Diaphragmatic Breathing Exercises    Exercise 1:  The Stimulating Breath (also called the Hayder Breath)  This exercise aims to raise energy level and increase alertness.    Inhale and exhale rapidly through your nose, keeping your mouth closed but relaxed. Your breaths in and out should be equal in duration, but as short as possible. This is a noisy breathing exercise.  Try for three in-and-out breath cycles per second. This produces a quick movement of the diaphragm, suggesting a hayder. Breathe normally after each cycle.  Do not do for more than 15 seconds on your first try. Each time you practice the Stimulating Breath, you can increase your time by five seconds or so, until you reach a full minute.    If done properly, you may feel invigorated, comparable to the heightened awareness you feel after a good workout. You should feel the effort at the back of the neck, the diaphragm, the chest and the abdomen. Try this breathing exercise the next time you need an energy boost and feel yourself reaching for a cup of coffee.        Exercise 2:  The 4-7-8 (or Relaxing Breath) Exercise  This exercise is utterly simple, takes almost no time, requires no equipment and can be done anywhere. Although you can do the exercise in any position, sit with your back straight while learning the exercise. Place the tip of your tongue against the ridge of tissue just behind your upper front teeth, and keep it there through the entire exercise. You will be exhaling through your mouth around your tongue; try pursing your lips slightly if this seems awkward.    Exhale completely through your mouth, making a whoosh sound.  Close your mouth and inhale quietly through your nose to a mental count of four.  Hold your breath for a count of seven.  Exhale completely through your mouth, making a whoosh sound to a count of eight.  This is one breath. Now inhale again and repeat the cycle three more times for a total of four breaths.    Note

## 2025-04-28 PROBLEM — F41.8 ANXIOUS DEPRESSION: Status: ACTIVE | Noted: 2025-04-28

## 2025-05-01 ENCOUNTER — OFFICE VISIT (OUTPATIENT)
Dept: ENT CLINIC | Age: 45
End: 2025-05-01

## 2025-05-01 VITALS
WEIGHT: 314 LBS | HEIGHT: 74 IN | SYSTOLIC BLOOD PRESSURE: 133 MMHG | BODY MASS INDEX: 40.3 KG/M2 | DIASTOLIC BLOOD PRESSURE: 80 MMHG

## 2025-05-01 DIAGNOSIS — J31.0 CHRONIC RHINITIS: ICD-10-CM

## 2025-05-01 DIAGNOSIS — J34.2 DEVIATED NASAL SEPTUM: ICD-10-CM

## 2025-05-01 DIAGNOSIS — H91.91 HEARING LOSS OF RIGHT EAR, UNSPECIFIED HEARING LOSS TYPE: ICD-10-CM

## 2025-05-01 DIAGNOSIS — H93.8X3 SENSATION OF FULLNESS IN BOTH EARS: ICD-10-CM

## 2025-05-01 DIAGNOSIS — H65.04 RECURRENT ACUTE SEROUS OTITIS MEDIA OF RIGHT EAR: ICD-10-CM

## 2025-05-01 DIAGNOSIS — K21.9 LARYNGOPHARYNGEAL REFLUX (LPR): Primary | ICD-10-CM

## 2025-05-01 DIAGNOSIS — J35.8 TONSIL ASYMMETRY: ICD-10-CM

## 2025-05-01 DIAGNOSIS — Z87.09 HISTORY OF TONSILLITIS: ICD-10-CM

## 2025-05-01 RX ORDER — PANTOPRAZOLE SODIUM 40 MG/1
40 TABLET, DELAYED RELEASE ORAL DAILY
Qty: 90 TABLET | Refills: 0 | Status: SHIPPED | OUTPATIENT
Start: 2025-05-01 | End: 2025-07-30

## 2025-05-01 NOTE — PROGRESS NOTES
tonsil is 1+.  Nose:Normal external nasal appearance.   Normal mucosa   NECK: Normal range of motion, no thyromegaly, trachea is midline, no lymphadenopathy, no neck masses, no crepitus  CHEST: Normal respiratory effort, no retractions, breathing comfortably  SKIN: No rashes, normal appearing skin, no evidence of skin lesions/tumors  Neuro:  cranial nerve II-XII intact; normal gait  Cardio:  no edema        PROCEDURE  Procedure Note:    Flexible Laryngoscopy      Procedure : Flexible Laryngoscopy  Anesthesia: Afrin with 4% lidocaine  Indication: Laryngeal mirror examination was not tolerated due to gag reflex  Description:  After verbal consent was obtained, the scope was passed along the floor of the left naris due to the severe rightward deviation of the septum, to the level of the larynx. There was no evidence of concerning masses or lesions of the base of tongue, vallecula, epiglottis, aryepiglottic folds, arytenoids, false vocal folds, true vocal folds, or pyriform sinuses. True vocal folds exhibited symmetric motion bilaterally without evidence of paralysis or paresis.The scope was removed. The patient tolerated the procedure without difficulty. There were no complications.  Pertinent findings: Moderate interarytenoid edema consistent with acid reflux.  No vocal cord polyps or masses.        This note was generated completely or in part utilizing Dragon dictation speech recognition software.  Occasionally, words are mistranscribed and despite editing, the text may contain inaccuracies due to incorrect word recognition.  If further clarification is needed please contact the office at [phone].    An electronic signature was used to authenticate this note.    --Margo Calabrese, CLARISSA - CNP

## 2025-05-08 ENCOUNTER — TELEMEDICINE (OUTPATIENT)
Dept: BARIATRICS/WEIGHT MGMT | Age: 45
End: 2025-05-08
Payer: COMMERCIAL

## 2025-05-08 VITALS — HEIGHT: 74 IN | WEIGHT: 312 LBS | BODY MASS INDEX: 40.04 KG/M2

## 2025-05-08 DIAGNOSIS — Z71.3 DIETARY COUNSELING AND SURVEILLANCE: ICD-10-CM

## 2025-05-08 DIAGNOSIS — E66.01 MORBID OBESITY WITH BMI OF 40.0-44.9, ADULT (HCC): Primary | ICD-10-CM

## 2025-05-08 PROCEDURE — 99214 OFFICE O/P EST MOD 30 MIN: CPT | Performed by: FAMILY MEDICINE

## 2025-05-08 ASSESSMENT — ENCOUNTER SYMPTOMS
ABDOMINAL DISTENTION: 0
VOMITING: 0
EYE PAIN: 0
CHOKING: 0
BLOOD IN STOOL: 0
CHEST TIGHTNESS: 0
ABDOMINAL PAIN: 0
WHEEZING: 0
APNEA: 0
NAUSEA: 0
SHORTNESS OF BREATH: 0
PHOTOPHOBIA: 0
COUGH: 0
CONSTIPATION: 0
DIARRHEA: 0

## 2025-05-08 NOTE — PROGRESS NOTES
12/07/2024 40  degrees Final    R Axis 12/07/2024 36  degrees Final    T Axis 12/07/2024 34  degrees Final    Diagnosis 12/07/2024 Normal sinus rhythmConfirmed by GREG GUTIERREZ ([id number]) on 12/8/2024 12:27:51 PM   Final    Troponin, High Sensitivity 12/07/2024 12  0 - 22 ng/L Final    Comment: The high-sensitivity troponin T result should not be compared with other  troponin methodologies.      SARS-CoV-2, NAAT 12/07/2024 Not Detected  Not Detected Final    Influenza A, GENIE 12/07/2024 Not Detected  Not Detected Final    Influenza B, GENIE 12/07/2024 Not Detected  Not Detected Final         Assessment and Plan:  1. Morbid obesity with BMI of 40.0-44.9, adult (HCC)  Stable, not at goal.  Continue current management.  Zepbound refilled.   Stress management.  Labs and in-office weight check prior to next visit.   F/u 4 weeks.     - Comprehensive Metabolic Panel; Future  - Hemoglobin A1C; Future  - Lipid Panel; Future  - TSH reflex to FT4; Future  - Vitamin B12 & Folate; Future  - Vitamin D 25 Hydroxy; Future  - CBC with Auto Differential; Future    2. Dietary counseling and surveillance  Low carb/tatiana diet.  Daily MVI.   Protein with every meal and snack.  Avoid skipping meals.          Nutrition Plan: [] LCHF/Ketogenic   [x] Modified low-calorie diet (low carb/low-tatiana)               [] Low-calorie diet    [] Maintenance       []Other        Exercise: [x] Cardio     [x] Resistance/strength training                       [x] ACSM recommendations (150 minutes/week in active weight loss)               Behavior: [x] Motivational interviewing performed    [] Referral for counseling                         [x] Discussed strategies to overcome habits/challenges for focus         [] Stress management   [x] Stimulus control         [] Sleep hygiene      Reviewed:  [x] Nutrition and the importance of regular protein intake  [x] Hidden carbohydrate sources  [x] Alcohol use  [x] Tobacco use   [x] Drug use- Denies  [x] Importance of

## 2025-05-29 ENCOUNTER — OFFICE VISIT (OUTPATIENT)
Dept: PSYCHOLOGY | Age: 45
End: 2025-05-29
Payer: COMMERCIAL

## 2025-05-29 DIAGNOSIS — F41.8 ANXIOUS DEPRESSION: Primary | ICD-10-CM

## 2025-05-29 PROCEDURE — 90832 PSYTX W PT 30 MINUTES: CPT | Performed by: PSYCHOLOGIST

## 2025-05-29 ASSESSMENT — PATIENT HEALTH QUESTIONNAIRE - PHQ9
2. FEELING DOWN, DEPRESSED OR HOPELESS: NOT AT ALL
9. THOUGHTS THAT YOU WOULD BE BETTER OFF DEAD, OR OF HURTING YOURSELF: NOT AT ALL
7. TROUBLE CONCENTRATING ON THINGS, SUCH AS READING THE NEWSPAPER OR WATCHING TELEVISION: MORE THAN HALF THE DAYS
SUM OF ALL RESPONSES TO PHQ QUESTIONS 1-9: 6
6. FEELING BAD ABOUT YOURSELF - OR THAT YOU ARE A FAILURE OR HAVE LET YOURSELF OR YOUR FAMILY DOWN: NOT AT ALL
SUM OF ALL RESPONSES TO PHQ QUESTIONS 1-9: 6
3. TROUBLE FALLING OR STAYING ASLEEP: SEVERAL DAYS
5. POOR APPETITE OR OVEREATING: SEVERAL DAYS
8. MOVING OR SPEAKING SO SLOWLY THAT OTHER PEOPLE COULD HAVE NOTICED. OR THE OPPOSITE, BEING SO FIGETY OR RESTLESS THAT YOU HAVE BEEN MOVING AROUND A LOT MORE THAN USUAL: NOT AT ALL
SUM OF ALL RESPONSES TO PHQ QUESTIONS 1-9: 6
1. LITTLE INTEREST OR PLEASURE IN DOING THINGS: SEVERAL DAYS
4. FEELING TIRED OR HAVING LITTLE ENERGY: SEVERAL DAYS
SUM OF ALL RESPONSES TO PHQ QUESTIONS 1-9: 6

## 2025-05-29 NOTE — PROGRESS NOTES
Behavioral Health Consultation  Zeb Cole, Ph.D.  Psychologist  5/29/2025  8:30 AM EDT      Time spent with Patient: 30 minutes  This is patient's second [facility] appointment.    Reason for Consult: anxiety; attention issues  Referring Provider: Robbin Kaye MD  [id number]  [facility] [address]    Feedback for PCP:     Pt provided informed consent for the behavioral health program. Discussed with patient model of service to include the limits of confidentiality (i.e. abuse reporting, suicide intervention, etc.) and short-term intervention focused approach.  Pt indicated understanding.  Feedback given to PCP.    S:  Patient (Flip) said that his father is doing much better, that he came back super sharp.\" With that, his anxiety and stress are much relieved. He said that another of his concerns is almost wrapped up, which is his father's will and advanced directives. He also got a needed storage shed to help organize his home he currently shares with his father. He said that his sleeping has also improved, though he says that it is still hard to get up in the morning. He said that he's become aware of tensing his jaw, and that he carries tension else where his body. He said that his 76-year-old Renuka Gallegos is going to get , as is a nephew. 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      Mental health history:     Social History     Tobacco Use    Smoking status: Never    Smokeless tobacco: Never   Substance Use Topics    Alcohol use: Yes     Comment: rarely        Illicit drugs:   Social History     Substance and Sexual Activity   Drug Use Yes    Types: Marijuana (Weed)        O:  MSE:  Appearance: good hygiene   Attitude: cooperative and friendly  Consciousness: alert  Orientation: oriented to person, place, time, general circumstance  Memory: recent and remote memory intact  Attention/Concentration: intact during session  Psychomotor Activity: normal  Eye Contact: normal  Speech:

## 2025-06-26 ENCOUNTER — TELEPHONE (OUTPATIENT)
Dept: BARIATRICS/WEIGHT MGMT | Age: 45
End: 2025-06-26

## 2025-06-26 NOTE — TELEPHONE ENCOUNTER
Submitted PA for Zepbound 15MG/0.5ML pen-injectors  Via [facility] BEMDCTBL STATUS: PENDING.    Follow up done daily; if no decision with in three days we will refax.  If another three days goes by with no decision will call the insurance for status.

## 2025-06-27 NOTE — TELEPHONE ENCOUNTER
The medication Zepbound 15MG/0.5ML pen-injectors is APPROVED from 05/27/2025  to 06/26/2026 .    Please notify the patient.    If this requires a response please respond to the pool ( P [facility] MEDICATION PRE-AUTH).

## 2025-07-09 ENCOUNTER — OFFICE VISIT (OUTPATIENT)
Dept: ENT CLINIC | Age: 45
End: 2025-07-09
Payer: COMMERCIAL

## 2025-07-09 VITALS
WEIGHT: 315 LBS | BODY MASS INDEX: 40.43 KG/M2 | DIASTOLIC BLOOD PRESSURE: 83 MMHG | HEART RATE: 75 BPM | HEIGHT: 74 IN | SYSTOLIC BLOOD PRESSURE: 131 MMHG | OXYGEN SATURATION: 96 %

## 2025-07-09 DIAGNOSIS — H65.04 RECURRENT ACUTE SEROUS OTITIS MEDIA OF RIGHT EAR: Primary | ICD-10-CM

## 2025-07-09 DIAGNOSIS — H91.91 HEARING LOSS OF RIGHT EAR, UNSPECIFIED HEARING LOSS TYPE: ICD-10-CM

## 2025-07-09 PROCEDURE — 99213 OFFICE O/P EST LOW 20 MIN: CPT | Performed by: STUDENT IN AN ORGANIZED HEALTH CARE EDUCATION/TRAINING PROGRAM

## 2025-07-09 NOTE — PROGRESS NOTES
[facility]  DIVISION OF OTOLARYNGOLOGY- HEAD & NECK SURGERY        Flip Hassan (:  1980) is a 44 y.o. male, here for evaluation of the following chief complaint(s):  Follow-up (Right ear feeling better)      ASSESSMENT/PLAN:  1. Recurrent acute serous otitis media of right ear  2. Hearing loss of right ear, unspecified hearing loss type      This is a very pleasant 44 y.o. male here today for evaluation of the the above-noted complaints.      -I feel the asymmetry of his tonsils is physiologic.  I do not feel he has a discrete mass.  Continue to monitor.  - History of ear tube on the right side.  He had developed recurrent otitis media on the right side, but this is since resolved.  We will hold off on placing a tube at this time.    - Follow-up as needed for earwax impaction    Medical Decision Making:  The following items were considered in medical decision making:  Independent review of images  Review / order clinical lab tests  Review / order radiology tests  Decision to obtain old records  Review and summation [email or web address]    SUBJECTIVE/OBJECTIVE:  HPI    Prior treatment notes:    Since I saw the patient last, we obtained an MRI IAC to rule out vascular pathology causing his pulsatile tinnitus.  I discussed with him that his MRI showed no evidence of vascular lesions.  It did show some mucoperiosteal thickening of the sinuses which is consistent with what he describes with regards to his sinonasal symptoms.  We again discussed trial of something such as fluticasone or allergy testing.    Patient has a small lesion of his left external ear.  I will prescribe him mupirocin ointment to use for the next 7 days.    Cerumen removed from both ears.  Follow-up as needed for this.    HISTORY OF PRESENT ILLNESS  Flip is a(n) 43 y.o. male who presents today for evaluation of ear issues.  Patient states that his ears feel full and he feels like they are

## 2025-07-14 NOTE — PROGRESS NOTES
[facility]   General & Laparoscopic Surgery  [employer]       HPI:     Flip Hassan is a very pleasant 44 y.o. obese male , Body mass index is 42.5 kg/m².. And multiple medical problems who is presenting for bariatric follow up care.   Flip Hassan is s/p laparoscopic sleeve gastrectomy by me   Comes today to the clinic without any complaints. Patient denies any nausea, vomiting, fevers, chills, shortness of breath, chest pain, constipation or urinary symptoms. Denies any heartburn nor dysphagia.     Patient is very pleased with the weight loss and resolution of co-morbid conditions.        Past Medical History:   Diagnosis Date    Anemia     Anxiety     Chronic GERD     Depression     Influenza A 03/27/2017    Kidney stones     Prostatitis     Rectal bleeding     intermittently    Ulcerative colitis (HCC)     Unspecified sleep apnea     USES CPAP     Past Surgical History:   Procedure Laterality Date    ABDOMEN SURGERY Bilateral 1985    hernia repair    COLONOSCOPY  2009    tics, ulcerative colitis    COLONOSCOPY  03/2014    O'Pickaway-polyp x 2, diverticulosis    COLONOSCOPY N/A 01/25/2024    O'[place]- no polyps, Moderate left sided and mild right sided diverticulosis, repeat in 5 years    SLEEVE GASTRECTOMY N/A 07/06/2021    [facility]    UPPER GASTROINTESTINAL ENDOSCOPY N/A 09/14/2020    EGD BIOPSY performed by Jamaal Cash DO at [facility] ENDOSCOPY     Family History   Problem Relation Age of Onset    Cancer Mother     Asthma Mother     High Blood Pressure Father     Obesity Father     COPD Sister      Social History     Tobacco Use    Smoking status: Never    Smokeless tobacco: Never   Substance Use Topics    Alcohol use: Yes     Comment: rarely     I counseled the patient on the importance of not smoking and risks of ETOH.   No Known Allergies  Vitals:    07/17/25 1237   BP: 137/83   Pulse: 86   Weight: (!) 150.1 kg (331 lb)   Height: 1.88 m (6' 2\")       Body mass index is 42.5

## 2025-07-16 ENCOUNTER — CLINICAL DOCUMENTATION (OUTPATIENT)
Dept: BARIATRICS/WEIGHT MGMT | Age: 45
End: 2025-07-16

## 2025-07-16 NOTE — PROGRESS NOTES
Dietary Assessment Note      Vitals: Self-reported wt: 330lbs  Patient lost 27 lbs over 1 year. Per pt he stopped MWM d/t cost.     Total Weight Loss: 118 lbs    Labs reviewed: no lab studies available for review at time of visit    Protein intake: {CHP AMB PROTEIN INTAKE:19886}     Fluid intake: {CHP AMB FLUID INTAKE:19887}    Multivitamin/mineral intake: {response; yes (wildcard)/no:854765}    Calcium intake: {response; yes (wildcard)/no:837656}    Other: {P AMB VITAMINS OTHER:19890}    Exercise: {response; yes (wildcard)/no:406366}    Nutrition Assessment: *** post-op visit.     Amount able to eat per sitting: ***    Following 30/30/30 rule: ***    Food Intolerances/issues: {Foods; allergens:59857}    Client Concerns: ***    Goals: ***    Plan: ***    NELL [address]

## 2025-07-17 ENCOUNTER — OFFICE VISIT (OUTPATIENT)
Dept: BARIATRICS/WEIGHT MGMT | Age: 45
End: 2025-07-17
Payer: COMMERCIAL

## 2025-07-17 VITALS
HEART RATE: 86 BPM | SYSTOLIC BLOOD PRESSURE: 137 MMHG | DIASTOLIC BLOOD PRESSURE: 83 MMHG | WEIGHT: 315 LBS | BODY MASS INDEX: 40.43 KG/M2 | HEIGHT: 74 IN

## 2025-07-17 DIAGNOSIS — E66.01 MORBID OBESITY WITH BMI OF 40.0-44.9, ADULT (HCC): Primary | ICD-10-CM

## 2025-07-17 DIAGNOSIS — E66.01 MORBID OBESITY WITH BMI OF 40.0-44.9, ADULT (HCC): ICD-10-CM

## 2025-07-17 DIAGNOSIS — Z98.84 S/P LAPAROSCOPIC SLEEVE GASTRECTOMY: ICD-10-CM

## 2025-07-17 LAB
BASOPHILS # BLD: 0.1 K/UL (ref 0–0.2)
BASOPHILS NFR BLD: 1 %
DEPRECATED RDW RBC AUTO: 13 % (ref 12.4–15.4)
EOSINOPHIL # BLD: 0.5 K/UL (ref 0–0.6)
EOSINOPHIL NFR BLD: 4.3 %
HCT VFR BLD AUTO: 42 % (ref 40.5–52.5)
HGB BLD-MCNC: 14.5 G/DL (ref 13.5–17.5)
LYMPHOCYTES # BLD: 1.9 K/UL (ref 1–5.1)
LYMPHOCYTES NFR BLD: 16.7 %
MCH RBC QN AUTO: 29.8 PG (ref 26–34)
MCHC RBC AUTO-ENTMCNC: 34.5 G/DL (ref 31–36)
MCV RBC AUTO: 86.5 FL (ref 80–100)
MONOCYTES # BLD: 0.7 K/UL (ref 0–1.3)
MONOCYTES NFR BLD: 5.9 %
NEUTROPHILS # BLD: 8.3 K/UL (ref 1.7–7.7)
NEUTROPHILS NFR BLD: 72.1 %
PLATELET # BLD AUTO: 365 K/UL (ref 135–450)
PMV BLD AUTO: 8.9 FL (ref 5–10.5)
RBC # BLD AUTO: 4.86 M/UL (ref 4.2–5.9)
WBC # BLD AUTO: 11.5 K/UL (ref 4–11)

## 2025-07-17 PROCEDURE — 99214 OFFICE O/P EST MOD 30 MIN: CPT | Performed by: SURGERY

## 2025-07-17 NOTE — PATIENT INSTRUCTIONS
Patient received dietary handouts and education.    Goals:   - Focus on 30/30/30 -try setting alarm   - Eliminate carbonation- diet soda   - Reduce eating out   - Add produce in 2x/day

## 2025-07-17 NOTE — PROGRESS NOTES
Dietary Assessment Note      Vitals:   Vitals:    25 1237   BP: 137/83   Pulse: 86   Weight: (!) 150.1 kg (331 lb)   Height: 1.88 m (6' 2\")    Patient lost 29 lbs over 1 yr.     Total Weight Loss: 117 lbs    Labs reviewed: no lab studies available for review at time of visit    Protein intake: 60-80 grams/day     Fluid intake: liter of water, diet soda 2 cans/ day     Multivitamin/mineral intake: yes 2 OTC multivitamins     Calcium intake: yes 2 calcium citrate     Other: Fiber gummies, iron     Exercise: ADL's, yard work, projects around house     Nutrition Assessment: 4 yr post-op visit. On Zepbound w/ Dr. Mohamud.     Breakfast: scrambled eggs + sanches OR McGriddle from [employer] OR protein shake   Snack: peanuts OR beef jerky or spoonful of PB  Lunch: crab meat + frozen veggies + cheese  Snack: same as above  Dinner: steak   Snack: SF pudding     Amount able to eat per sittin-10 oz     Following  rule: no. Sometimes waits 30 min before & after. Takes sips during meals.     Food Intolerances/issues: none    Client Concerns: Pt reports lowest wt was 299 lbs then within the last 6 months gained due to family issues/ taking care of father, on steroid medication, and without Zepbound for 5 months due to insurance.    Handouts: none    Goals:   - Focus on  -try setting alarm   - Eliminate carbonation- diet soda   - Reduce eating out   - Add produce in 2x/day     Plan: Follow up per provider and as needed    Solange Berrios RD

## 2025-07-18 LAB
25(OH)D3 SERPL-MCNC: 19.2 NG/ML
ALBUMIN SERPL-MCNC: 4.3 G/DL (ref 3.4–5)
ALBUMIN/GLOB SERPL: 1.4 {RATIO} (ref 1.1–2.2)
ALP SERPL-CCNC: 110 U/L (ref 40–129)
ALT SERPL-CCNC: 25 U/L (ref 10–40)
ANION GAP SERPL CALCULATED.3IONS-SCNC: 11 MMOL/L (ref 3–16)
AST SERPL-CCNC: 20 U/L (ref 15–37)
BILIRUB SERPL-MCNC: 0.4 MG/DL (ref 0–1)
BUN SERPL-MCNC: 13 MG/DL (ref 7–20)
CALCIUM SERPL-MCNC: 9.5 MG/DL (ref 8.3–10.6)
CHLORIDE SERPL-SCNC: 103 MMOL/L (ref 99–110)
CHOLEST SERPL-MCNC: 175 MG/DL (ref 0–199)
CO2 SERPL-SCNC: 27 MMOL/L (ref 21–32)
CREAT SERPL-MCNC: 0.8 MG/DL (ref 0.9–1.3)
EST. AVERAGE GLUCOSE BLD GHB EST-MCNC: 99.7 MG/DL
FOLATE SERPL-MCNC: 11.3 NG/ML (ref 4.78–24.2)
GFR SERPLBLD CREATININE-BSD FMLA CKD-EPI: >90 ML/MIN/{1.73_M2}
GLUCOSE SERPL-MCNC: 72 MG/DL (ref 70–99)
HBA1C MFR BLD: 5.1 %
HDLC SERPL-MCNC: 56 MG/DL (ref 40–60)
LDLC SERPL CALC-MCNC: 94 MG/DL
POTASSIUM SERPL-SCNC: 4.3 MMOL/L (ref 3.5–5.1)
PROT SERPL-MCNC: 7.3 G/DL (ref 6.4–8.2)
SODIUM SERPL-SCNC: 141 MMOL/L (ref 136–145)
TRIGL SERPL-MCNC: 124 MG/DL (ref 0–150)
TSH SERPL DL<=0.005 MIU/L-ACNC: 1.47 UIU/ML (ref 0.27–4.2)
VIT B12 SERPL-MCNC: 663 PG/ML (ref 211–911)
VLDLC SERPL CALC-MCNC: 25 MG/DL

## 2025-07-25 DIAGNOSIS — K21.9 LARYNGOPHARYNGEAL REFLUX (LPR): ICD-10-CM

## 2025-07-25 NOTE — TELEPHONE ENCOUNTER
Refill Request:     Last Office Visit:  5/1/2025     Next Scheduled Visit : Visit date not found     Medication Requested:   Requested Prescriptions     Pending Prescriptions Disp Refills    pantoprazole (PROTONIX) 40 MG tablet [Pharmacy Med Name: PANTOPRAZOLE 40MG TABLETS] 90 tablet 0     Sig: TAKE 1 TABLET BY MOUTH DAILY        Pharmacy:    [id number] - [place], [place] - [address] [address] [facility] - P [phone] - F [phone]  [address] [facility] [address] [address]  Phone: [phone] Fax: [phone]    [facility] [id number] - [place], [place] - [address] - P [phone] - F [phone]  [address]  [facility] [address]  Phone: [phone] Fax: [phone]    [facility] #[id number] - [place], [place] - [address] - P [phone] - F [phone]  [address]  [facility] [address]  Phone: [phone] Fax: [phone]    [facility] - [facility] [address] [place] [address] - P [phone] - F [phone]  [address] [facility] [address]  Phone: [phone] Fax: [phone]    [facility] [id number] - [place], [place] - [address] - P [phone] - F [phone]  [address]  [facility] [address]  Phone: [phone] Fax: [phone]

## 2025-07-28 RX ORDER — PANTOPRAZOLE SODIUM 40 MG/1
40 TABLET, DELAYED RELEASE ORAL DAILY
Qty: 90 TABLET | Refills: 0 | Status: SHIPPED | OUTPATIENT
Start: 2025-07-28 | End: 2025-10-26

## 2025-08-01 DIAGNOSIS — K21.9 LARYNGOPHARYNGEAL REFLUX (LPR): ICD-10-CM

## 2025-08-04 RX ORDER — PANTOPRAZOLE SODIUM 40 MG/1
40 TABLET, DELAYED RELEASE ORAL DAILY
Qty: 90 TABLET | Refills: 0 | Status: SHIPPED | OUTPATIENT
Start: 2025-08-04 | End: 2025-11-02

## 2025-08-06 DIAGNOSIS — E55.9 VITAMIN D INSUFFICIENCY: Primary | ICD-10-CM

## 2025-08-06 DIAGNOSIS — E66.01 MORBID OBESITY WITH BMI OF 40.0-44.9, ADULT (HCC): ICD-10-CM

## 2025-08-07 RX ORDER — ERGOCALCIFEROL 1.25 MG/1
50000 CAPSULE, LIQUID FILLED ORAL WEEKLY
Qty: 8 CAPSULE | Refills: 0 | Status: SHIPPED | OUTPATIENT
Start: 2025-08-07

## 2025-08-12 ENCOUNTER — TELEMEDICINE (OUTPATIENT)
Dept: BARIATRICS/WEIGHT MGMT | Age: 45
End: 2025-08-12
Payer: COMMERCIAL

## 2025-08-12 DIAGNOSIS — Z71.3 DIETARY COUNSELING AND SURVEILLANCE: ICD-10-CM

## 2025-08-12 DIAGNOSIS — E66.01 MORBID OBESITY WITH BMI OF 40.0-44.9, ADULT (HCC): Primary | ICD-10-CM

## 2025-08-12 PROCEDURE — 99214 OFFICE O/P EST MOD 30 MIN: CPT | Performed by: FAMILY MEDICINE

## 2025-08-12 ASSESSMENT — ENCOUNTER SYMPTOMS
WHEEZING: 0
CHEST TIGHTNESS: 0
NAUSEA: 0
VOMITING: 0
CONSTIPATION: 0
PHOTOPHOBIA: 0
COUGH: 0
SHORTNESS OF BREATH: 0
BLOOD IN STOOL: 0
DIARRHEA: 0
ABDOMINAL DISTENTION: 0
CHOKING: 0
APNEA: 0
EYE PAIN: 0
ABDOMINAL PAIN: 0

## (undated) DEVICE — BLADELESS OBTURATOR, LONG: Brand: WECK VISTA

## (undated) DEVICE — ADHESIVE SKIN CLSR 0.7ML TOP DERMBND ADV

## (undated) DEVICE — SURE SET-DOUBLE BASIN-LF: Brand: MEDLINE INDUSTRIES, INC.

## (undated) DEVICE — SOLUTION INJ LR VISIV 1000ML BG

## (undated) DEVICE — SYRINGE MED 10ML TRNSLUC BRL PLUNG BLK MRK POLYPR CTRL

## (undated) DEVICE — SOLUTION IV 1000ML 0.9% SOD CHL

## (undated) DEVICE — SUTURE VCRL SZ 4-0 L18IN ABSRB UD L19MM PS-2 3/8 CIR PRIM J496H

## (undated) DEVICE — NEEDLE HYPO 22GA L1.5IN BLK POLYPR HUB S STL REG BVL STR

## (undated) DEVICE — ARM DRAPE

## (undated) DEVICE — PUMP SUC IRR TBNG L10FT W/ HNDPC ASSEMB STRYKEFLOW 2

## (undated) DEVICE — [HIGH FLOW INSUFFLATOR,  DO NOT USE IF PACKAGE IS DAMAGED,  KEEP DRY,  KEEP AWAY FROM SUNLIGHT,  PROTECT FROM HEAT AND RADIOACTIVE SOURCES.]: Brand: PNEUMOSURE

## (undated) DEVICE — SYSTEM SMK EVAC LAP TBNG FILTER HSNG BENT STYL PNK SEE CLR

## (undated) DEVICE — TROCAR: Brand: KII FIOS FIRST ENTRY

## (undated) DEVICE — ENDOSCOPY KIT: Brand: MEDLINE INDUSTRIES, INC.

## (undated) DEVICE — 40583 XL ADVANCED TRENDELENBURG POSITIONING KIT: Brand: 40583 XL ADVANCED TRENDELENBURG POSITIONING KIT

## (undated) DEVICE — CADIERE FORCEPS: Brand: ENDOWRIST

## (undated) DEVICE — STAPLER 60 RELOAD BLUE: Brand: SUREFORM

## (undated) DEVICE — NEEDLE INSUF L150MM DIA2MM DISP FOR PNEUMOPERI ENDOPATH

## (undated) DEVICE — SOLUTION ANTIFOG VIS SYS CLEARIFY LAPSCP

## (undated) DEVICE — BINDER ABD H12IN FOR 62-74IN WAIST UNIV 4 PNL PREM DSGN E

## (undated) DEVICE — SUTURE VCRL SZ 0 L54IN ABSRB VLT W/O NDL POLYGLACTIN 910 J616H

## (undated) DEVICE — 60 ML SYRINGE,CATHETER TIP: Brand: MONOJECT

## (undated) DEVICE — STAPLER 60: Brand: SUREFORM

## (undated) DEVICE — STAPLE INT BIOABSORBABLE REINF LN SUREFORM 60 GRN SEAMGRD

## (undated) DEVICE — GOWN,SIRUS,POLYRNF,BRTHSLV,LG,30/CS: Brand: MEDLINE

## (undated) DEVICE — GLOVE SURG SZ 75 L12IN THK75MIL DK GRN LTX FREE

## (undated) DEVICE — PROGRASP FORCEPS: Brand: ENDOWRIST

## (undated) DEVICE — FORCEPS BX L240CM JAW DIA2.8MM L CAP W/ NDL MIC MESH TOOTH

## (undated) DEVICE — ELECTROSURGICAL PENCIL ROCKER SWITCH NON COATED BLADE ELECTRODE 10 FT (3 M) CORD HOLSTER: Brand: MEGADYNE

## (undated) DEVICE — SURGICAL SET UP - SURE SET: Brand: MEDLINE INDUSTRIES, INC.

## (undated) DEVICE — APPLICATOR MEDICATED 26 CC SOLUTION HI LT ORNG CHLORAPREP

## (undated) DEVICE — LAPAROSCOPIC SCISSORS: Brand: EPIX LAPAROSCOPIC SCISSORS

## (undated) DEVICE — BOWL MED L 32OZ PLAS W/ MOLD GRAD EZ OPN PEEL PCH

## (undated) DEVICE — VISIGI 3D®  CALIBRATION SYSTEM  SIZE 36FR STD W/ BULB: Brand: BOEHRINGER® VISIGI 3D™ SLEEVE GASTRECTOMY CALIBRATION SYSTEM, SIZE 36FR W/BULB

## (undated) DEVICE — SEAL

## (undated) DEVICE — PLATE ES AD W 9FT CRD 2

## (undated) DEVICE — GARMENT,MEDLINE,DVT,INT,CALF,LG, GEN2: Brand: MEDLINE

## (undated) DEVICE — SYRINGE MED 10ML SLIP TIP BLNT FILL AND LUERLOCK DISP

## (undated) DEVICE — VESSEL SEALER EXTEND: Brand: ENDOWRIST

## (undated) DEVICE — BLANKET WRM W29.9XL79.1IN UP BODY FORC AIR MISTRAL-AIR

## (undated) DEVICE — CANNULA SEAL

## (undated) DEVICE — GLOVE ORANGE PI 7   MSG9070

## (undated) DEVICE — ANTI-FOG SOLUTION WITH FOAM PAD: Brand: DEVON

## (undated) DEVICE — JEWISH HOSPITAL TURNOVER KIT: Brand: MEDLINE INDUSTRIES, INC.

## (undated) DEVICE — DRAPE,LAP,CHOLE,W/TROUGHS,STERILE: Brand: MEDLINE

## (undated) DEVICE — SPONGE,LAP,4"X18",XR,ST,5/PK,40PK/CS: Brand: MEDLINE INDUSTRIES, INC.

## (undated) DEVICE — STAPLER 60 RELOAD BLACK: Brand: SUREFORM

## (undated) DEVICE — STAPLER 60 RELOAD GREEN: Brand: SUREFORM

## (undated) DEVICE — Device

## (undated) DEVICE — DEVICE CLSR 10/12MM XL PRT SYS SUT PASS ST DISP CARTER